# Patient Record
Sex: FEMALE | Race: WHITE | Employment: OTHER | URBAN - METROPOLITAN AREA
[De-identification: names, ages, dates, MRNs, and addresses within clinical notes are randomized per-mention and may not be internally consistent; named-entity substitution may affect disease eponyms.]

---

## 2017-03-03 ENCOUNTER — TELEPHONE (OUTPATIENT)
Dept: DERMATOLOGY | Age: 72
End: 2017-03-03

## 2017-03-29 ENCOUNTER — HOSPITAL ENCOUNTER (OUTPATIENT)
Dept: MAMMOGRAPHY | Age: 72
Discharge: HOME | End: 2017-03-29
Attending: OBSTETRICS & GYNECOLOGY | Admitting: OBSTETRICS & GYNECOLOGY

## 2017-03-29 DIAGNOSIS — Z12.31 ENCOUNTER FOR SCREENING MAMMOGRAM FOR BREAST CANCER: ICD-10-CM

## 2017-05-08 ENCOUNTER — HOSPITAL ENCOUNTER (OUTPATIENT)
Dept: OTHER | Age: 72
Discharge: HOME | End: 2017-05-08
Attending: FAMILY MEDICINE | Admitting: FAMILY MEDICINE

## 2017-05-09 PROBLEM — M60.9 MYOSITIS OF MULTIPLE SITES: Status: ACTIVE | Noted: 2017-05-09

## 2017-05-09 LAB
ESTIMATED AVERAGE GLUCOSE: 185.8 MG/DL
HBA1C MFR BLD: 8.1 %

## 2017-06-06 ENCOUNTER — TELEPHONE (OUTPATIENT)
Dept: FAMILY MEDICINE CLINIC | Age: 72
End: 2017-06-06

## 2017-06-06 ENCOUNTER — OFFICE VISIT (OUTPATIENT)
Dept: DERMATOLOGY | Age: 72
End: 2017-06-06

## 2017-06-06 DIAGNOSIS — L82.1 SK (SEBORRHEIC KERATOSIS): Primary | ICD-10-CM

## 2017-06-06 DIAGNOSIS — L82.0 INFLAMED SEBORRHEIC KERATOSIS: ICD-10-CM

## 2017-06-06 DIAGNOSIS — L85.1 STUCCO KERATOSIS: ICD-10-CM

## 2017-06-06 DIAGNOSIS — Z85.828 HISTORY OF BASAL CELL CARCINOMA: ICD-10-CM

## 2017-06-06 PROCEDURE — 99213 OFFICE O/P EST LOW 20 MIN: CPT | Performed by: DERMATOLOGY

## 2017-06-06 PROCEDURE — 17110 DESTRUCTION B9 LES UP TO 14: CPT | Performed by: DERMATOLOGY

## 2017-07-07 PROBLEM — T50.905A MEDICATION REACTION: Status: ACTIVE | Noted: 2017-07-07

## 2017-07-17 ENCOUNTER — TELEPHONE (OUTPATIENT)
Dept: FAMILY MEDICINE CLINIC | Age: 72
End: 2017-07-17

## 2017-07-18 ENCOUNTER — OFFICE VISIT (OUTPATIENT)
Dept: FAMILY MEDICINE CLINIC | Age: 72
End: 2017-07-18

## 2017-07-18 VITALS
WEIGHT: 229 LBS | BODY MASS INDEX: 36.41 KG/M2 | SYSTOLIC BLOOD PRESSURE: 134 MMHG | DIASTOLIC BLOOD PRESSURE: 86 MMHG | HEART RATE: 114 BPM | OXYGEN SATURATION: 93 %

## 2017-07-18 DIAGNOSIS — K59.1 FUNCTIONAL DIARRHEA: ICD-10-CM

## 2017-07-18 DIAGNOSIS — E11.9 TYPE 2 DIABETES MELLITUS WITHOUT COMPLICATION, WITHOUT LONG-TERM CURRENT USE OF INSULIN (HCC): Primary | ICD-10-CM

## 2017-07-18 DIAGNOSIS — G56.03 BILATERAL CARPAL TUNNEL SYNDROME: ICD-10-CM

## 2017-07-18 DIAGNOSIS — J44.9 COPD, MODERATE (HCC): ICD-10-CM

## 2017-07-18 PROCEDURE — 99214 OFFICE O/P EST MOD 30 MIN: CPT | Performed by: FAMILY MEDICINE

## 2017-07-18 RX ORDER — GLIMEPIRIDE 1 MG/1
1 TABLET ORAL
Qty: 30 TABLET | Refills: 3 | Status: SHIPPED | OUTPATIENT
Start: 2017-07-18 | End: 2017-08-22 | Stop reason: DRUGHIGH

## 2017-07-18 RX ORDER — METFORMIN HYDROCHLORIDE EXTENDED-RELEASE TABLETS 500 MG/1
1000 TABLET, FILM COATED, EXTENDED RELEASE ORAL 2 TIMES DAILY WITH MEALS
Qty: 120 TABLET | Refills: 3 | Status: SHIPPED | OUTPATIENT
Start: 2017-07-18 | End: 2017-10-11 | Stop reason: SDUPTHER

## 2017-07-19 ENCOUNTER — OFFICE VISIT (OUTPATIENT)
Dept: PULMONOLOGY | Age: 72
End: 2017-07-19

## 2017-07-19 ENCOUNTER — PROCEDURE VISIT (OUTPATIENT)
Dept: NEUROLOGY | Age: 72
End: 2017-07-19

## 2017-07-19 VITALS — HEART RATE: 81 BPM | SYSTOLIC BLOOD PRESSURE: 132 MMHG | DIASTOLIC BLOOD PRESSURE: 83 MMHG

## 2017-07-19 DIAGNOSIS — G56.03 BILATERAL CARPAL TUNNEL SYNDROME: Primary | ICD-10-CM

## 2017-07-19 DIAGNOSIS — G56.23 ULNAR NEUROPATHY OF BOTH UPPER EXTREMITIES: ICD-10-CM

## 2017-07-19 DIAGNOSIS — J44.9 COPD, MODERATE (HCC): ICD-10-CM

## 2017-07-19 DIAGNOSIS — R06.02 SOB (SHORTNESS OF BREATH): Primary | ICD-10-CM

## 2017-07-19 DIAGNOSIS — I10 ESSENTIAL HYPERTENSION: Chronic | ICD-10-CM

## 2017-07-19 PROCEDURE — 95911 NRV CNDJ TEST 9-10 STUDIES: CPT | Performed by: PSYCHIATRY & NEUROLOGY

## 2017-07-19 PROCEDURE — 99214 OFFICE O/P EST MOD 30 MIN: CPT | Performed by: INTERNAL MEDICINE

## 2017-07-19 PROCEDURE — 95886 MUSC TEST DONE W/N TEST COMP: CPT | Performed by: PSYCHIATRY & NEUROLOGY

## 2017-07-19 RX ORDER — ALBUTEROL SULFATE 90 UG/1
2 AEROSOL, METERED RESPIRATORY (INHALATION) EVERY 6 HOURS PRN
Qty: 1 INHALER | Refills: 11 | Status: SHIPPED | OUTPATIENT
Start: 2017-07-19 | End: 2017-10-11 | Stop reason: SDUPTHER

## 2017-07-19 RX ORDER — ALBUTEROL SULFATE 90 UG/1
2 AEROSOL, METERED RESPIRATORY (INHALATION) EVERY 6 HOURS PRN
Qty: 1 INHALER | Refills: 11 | Status: SHIPPED | OUTPATIENT
Start: 2017-07-19 | End: 2019-01-07

## 2017-07-19 RX ORDER — SAXAGLIPTIN 5 MG/1
1 TABLET, FILM COATED ORAL DAILY
COMMUNITY
Start: 2017-06-30 | End: 2017-07-19 | Stop reason: SINTOL

## 2017-07-20 DIAGNOSIS — G56.03 BILATERAL CARPAL TUNNEL SYNDROME: Primary | ICD-10-CM

## 2017-08-09 ENCOUNTER — OFFICE VISIT (OUTPATIENT)
Dept: ORTHOPEDIC SURGERY | Age: 72
End: 2017-08-09

## 2017-08-09 ENCOUNTER — TELEPHONE (OUTPATIENT)
Dept: PULMONOLOGY | Age: 72
End: 2017-08-09

## 2017-08-09 ENCOUNTER — TELEPHONE (OUTPATIENT)
Dept: FAMILY MEDICINE CLINIC | Age: 72
End: 2017-08-09

## 2017-08-09 VITALS
WEIGHT: 229 LBS | BODY MASS INDEX: 36.8 KG/M2 | SYSTOLIC BLOOD PRESSURE: 141 MMHG | HEART RATE: 66 BPM | RESPIRATION RATE: 17 BRPM | DIASTOLIC BLOOD PRESSURE: 79 MMHG | HEIGHT: 66 IN

## 2017-08-09 DIAGNOSIS — G56.03 BILATERAL CARPAL TUNNEL SYNDROME: Primary | ICD-10-CM

## 2017-08-09 PROCEDURE — 20526 THER INJECTION CARP TUNNEL: CPT | Performed by: ORTHOPAEDIC SURGERY

## 2017-08-09 PROCEDURE — 99203 OFFICE O/P NEW LOW 30 MIN: CPT | Performed by: ORTHOPAEDIC SURGERY

## 2017-08-21 ENCOUNTER — TELEPHONE (OUTPATIENT)
Dept: FAMILY MEDICINE CLINIC | Age: 72
End: 2017-08-21

## 2017-08-22 RX ORDER — GLIMEPIRIDE 2 MG/1
2 TABLET ORAL EVERY MORNING
Qty: 30 TABLET | Refills: 2 | Status: SHIPPED | OUTPATIENT
Start: 2017-08-22 | End: 2017-11-08 | Stop reason: SDUPTHER

## 2017-10-11 PROBLEM — G56.02 LEFT CARPAL TUNNEL SYNDROME: Status: ACTIVE | Noted: 2017-10-11

## 2017-10-11 PROBLEM — G56.22 ULNAR NEUROPATHY OF LEFT UPPER EXTREMITY: Status: ACTIVE | Noted: 2017-10-11

## 2017-10-17 ENCOUNTER — PAT TELEPHONE (OUTPATIENT)
Dept: PREADMISSION TESTING | Age: 72
End: 2017-10-17

## 2017-10-17 ENCOUNTER — TELEPHONE (OUTPATIENT)
Dept: ORTHOPEDIC SURGERY | Age: 72
End: 2017-10-17

## 2017-10-17 NOTE — TELEPHONE ENCOUNTER
Patient has sx scheduled with CBW this Thursday  She had a pre op physical done already and just wanted to make sure that is all she needs    Not sure if she needed anything else prior to sx- or if she does will it be done the day of sx    Pls call patient at [phone]

## 2017-10-18 ENCOUNTER — SURG/PROC ORDERS (OUTPATIENT)
Dept: ANESTHESIOLOGY | Age: 72
End: 2017-10-18

## 2017-10-18 VITALS — WEIGHT: 225 LBS | HEIGHT: 67 IN | BODY MASS INDEX: 35.31 KG/M2

## 2017-10-18 RX ORDER — SODIUM CHLORIDE 9 MG/ML
INJECTION, SOLUTION INTRAVENOUS CONTINUOUS
Status: CANCELLED | OUTPATIENT
Start: 2017-10-18

## 2017-10-18 RX ORDER — SODIUM CHLORIDE 0.9 % (FLUSH) 0.9 %
10 SYRINGE (ML) INJECTION EVERY 12 HOURS SCHEDULED
Status: CANCELLED | OUTPATIENT
Start: 2017-10-18

## 2017-10-18 RX ORDER — SODIUM CHLORIDE 0.9 % (FLUSH) 0.9 %
10 SYRINGE (ML) INJECTION PRN
Status: CANCELLED | OUTPATIENT
Start: 2017-10-18

## 2017-10-18 ASSESSMENT — PAIN DESCRIPTION - LOCATION: LOCATION: NECK

## 2017-10-18 ASSESSMENT — PAIN - FUNCTIONAL ASSESSMENT: PAIN_FUNCTIONAL_ASSESSMENT: 0-10

## 2017-10-18 ASSESSMENT — PAIN SCALES - GENERAL: PAINLEVEL_OUTOF10: 5

## 2017-10-18 NOTE — PRE-PROCEDURE INSTRUCTIONS
C-Difficile admission screening and protocol:     * Admitted with diarrhea? no     *Prior history of C-Diff. In last 3 months?no     *Antibiotic use in the past 6-8 weeks? no     *Prior hospitalization or nursing home in the last month?   no

## 2017-10-18 NOTE — PRE-PROCEDURE INSTRUCTIONS
[facility] time__0700__________        Surgery time___0830_________    Take the following medications with a sip of water: prozac,atenolol,losartan    Do not eat or drink anything after 12:00 midnight prior to your surgery. This includes water chewing gum, mints and ice chips. You may brush your teeth and gargle the morning of your surgery, but do not swallow the water     Please see your family doctor/pediatrician for a history and physical and/or concerning medications. Bring any test results/reports from your physicians office. If you are under the care of a heart doctor or specialist doctor, please be aware that you may be asked to them for clearance    You may be asked to stop blood thinners such as Coumadin, Plavix, Fragmin, Lovenox, etc., or any anti-inflammatories such as:  Aspirin, Ibuprofen, Advil, Naproxen prior to your surgery. We also ask that you stop any OTC medications such as fish oil, vitamin E, glucosamine, garlic, Multivitamins, COQ 10, etc.    We ask that you do not smoke 24 hours prior to surgery  We ask that you do not  drink any alcoholic beverages 24 hours prior to surgery     You must make arrangements for a responsible adult to take you home after your surgery. For your safety you will not be allowed to leave alone or drive yourself home. Your surgery will be cancelled if you do not have a ride home. Also for your safety, it is strongly suggested that someone stay with you the first 24 hours after your surgery. A parent or legal guardian must accompany a child scheduled for surgery and plan to stay at the hospital until the child is discharged. Please do not bring other children with you. For your comfort, please wear simple loose fitting clothing to the hospital.  Please do not bring valuables.     Do not wear any make-up or nail polish on your fingers or toes      For your safety, please do not wear any jewelry

## 2017-10-19 ENCOUNTER — HOSPITAL ENCOUNTER (OUTPATIENT)
Dept: SURGERY | Age: 72
Discharge: HOME | End: 2017-10-19
Attending: ORTHOPAEDIC SURGERY | Admitting: ORTHOPAEDIC SURGERY

## 2017-10-19 VITALS
WEIGHT: 232.14 LBS | TEMPERATURE: 98 F | HEART RATE: 68 BPM | RESPIRATION RATE: 18 BRPM | HEIGHT: 66 IN | OXYGEN SATURATION: 94 % | DIASTOLIC BLOOD PRESSURE: 63 MMHG | BODY MASS INDEX: 37.31 KG/M2 | SYSTOLIC BLOOD PRESSURE: 113 MMHG

## 2017-10-19 LAB
ANION GAP SERPL CALCULATED.3IONS-SCNC: 14 MMOL/L (ref 3–16)
BUN BLDV-MCNC: 18 MG/DL (ref 7–20)
CALCIUM SERPL-MCNC: 9.4 MG/DL (ref 8.3–10.6)
CHLORIDE BLD-SCNC: 100 MMOL/L (ref 99–110)
CO2: 25 MMOL/L (ref 21–32)
CREAT SERPL-MCNC: <0.5 MG/DL (ref 0.6–1.2)
GFR AFRICAN AMERICAN: >60
GFR NON-AFRICAN AMERICAN: >60
GLUCOSE BLD-MCNC: 126 MG/DL (ref 70–99)
GLUCOSE BLD-MCNC: 136 MG/DL (ref 70–99)
PERFORMED ON: ABNORMAL
POTASSIUM SERPL-SCNC: 4 MMOL/L (ref 3.5–5.1)
SODIUM BLD-SCNC: 139 MMOL/L (ref 136–145)

## 2017-10-19 RX ORDER — LABETALOL HYDROCHLORIDE 5 MG/ML
5 INJECTION, SOLUTION INTRAVENOUS EVERY 10 MIN PRN
Status: DISCONTINUED | OUTPATIENT
Start: 2017-10-19 | End: 2017-10-20 | Stop reason: HOSPADM

## 2017-10-19 RX ORDER — FENTANYL CITRATE 50 UG/ML
25 INJECTION, SOLUTION INTRAMUSCULAR; INTRAVENOUS EVERY 5 MIN PRN
Status: DISCONTINUED | OUTPATIENT
Start: 2017-10-19 | End: 2017-10-20 | Stop reason: HOSPADM

## 2017-10-19 RX ORDER — PROMETHAZINE HYDROCHLORIDE 25 MG/ML
6.25 INJECTION, SOLUTION INTRAMUSCULAR; INTRAVENOUS
Status: ACTIVE | OUTPATIENT
Start: 2017-10-19 | End: 2017-10-19

## 2017-10-19 RX ORDER — SODIUM CHLORIDE 0.9 % (FLUSH) 0.9 %
10 SYRINGE (ML) INJECTION EVERY 12 HOURS SCHEDULED
Status: DISCONTINUED | OUTPATIENT
Start: 2017-10-19 | End: 2017-10-20 | Stop reason: HOSPADM

## 2017-10-19 RX ORDER — SODIUM CHLORIDE 0.9 % (FLUSH) 0.9 %
10 SYRINGE (ML) INJECTION PRN
Status: DISCONTINUED | OUTPATIENT
Start: 2017-10-19 | End: 2017-10-20 | Stop reason: HOSPADM

## 2017-10-19 RX ORDER — SODIUM CHLORIDE 9 MG/ML
INJECTION, SOLUTION INTRAVENOUS CONTINUOUS
Status: DISCONTINUED | OUTPATIENT
Start: 2017-10-19 | End: 2017-10-20 | Stop reason: HOSPADM

## 2017-10-19 RX ORDER — FENTANYL CITRATE 50 UG/ML
50 INJECTION, SOLUTION INTRAMUSCULAR; INTRAVENOUS EVERY 5 MIN PRN
Status: DISCONTINUED | OUTPATIENT
Start: 2017-10-19 | End: 2017-10-20 | Stop reason: HOSPADM

## 2017-10-19 RX ADMIN — SODIUM CHLORIDE: 9 INJECTION, SOLUTION INTRAVENOUS at 07:59

## 2017-10-19 ASSESSMENT — ENCOUNTER SYMPTOMS: SHORTNESS OF BREATH: 1

## 2017-10-19 ASSESSMENT — PAIN - FUNCTIONAL ASSESSMENT: PAIN_FUNCTIONAL_ASSESSMENT: 0-10

## 2017-10-19 ASSESSMENT — PAIN SCALES - GENERAL
PAINLEVEL_OUTOF10: 0

## 2017-10-19 ASSESSMENT — LIFESTYLE VARIABLES: SMOKING_STATUS: 0

## 2017-10-19 NOTE — OP NOTE
OPERATIVE REPORT              . Patient:  Lui Avalos    YOB: 1945  Date of Service:  10/19/2017  Location:  [address] [place] [address]      Preoperative Diagnoses:  Right  carpal tunnel syndrome & Right cubital tunnel syndrome     Postoperative Diagnoses:  Same    Procedures:   Right  Carpal Tunnel Release & Right Ulnar Nerve decompression at the Cubital Tunnel     Surgeon:    Aubrey Burns. Abiodun Kidd MD    Anesthesia:   General                                   Blood Loss:    Minimal         Complications:    None                          Tourniquet Time:   6 minutes      Indications: Ms. Lui Avalos is a 71y.o.  year old female with Right  carpal tunnel syndrome & Right cubital tunnel syndrome . I have discussed preoperatively with her  the complications, limitations, expectations, alternatives and risk of the planned surgical care which she understood & all of her  questions were answered. Ms. Lui Avalos has provided written informed consent to proceed. After written consent was obtained and the proper operative sites were identified and marked, Ms. Lui Avalos was brought to the operating room and placed in the supine position on the operating room table with the Right arm extended upon a hand table. General anesthesia was induced & the Right upper extremity was prepped and draped in the usual sterile fashion. Procedure:   After Esmarch exsanguination, the pneuomo-tourniquet was inflated to 250 millimeters of Mercury about the arm. Attention was turned to the medial elbow. A curvilinear incision was fashioned over the posterior medial aspect of the elbow centered between the medial epicondyle and the tip of the olecranon. Dissection was carried carefully through the subcutaneous tissue identifying and protecting the superficial neurovascular structures. The cubital tunnel was identified and cleared of overlying soft tissue.  Careful incision allowed exposure of the ulnar nerve. The nerve was traced proximally and circumferential control of the nerve was obtained. It was dissected proximally to the level of the connection between the biceps and triceps muscles, approximately 3 cm proximal to the medial epicondyle. It was carefully mobilized from the medial intermuscular septum. It was traced distally, being completely freed along the course of the cubital tunnel, and was dissected distally to the level of the second motor branch as it split the heads of the FCU muscle. There was a tight fibrous band at the confluence of the heads of the FCU which was carefully divided. Digital palpation revealed that there were no further proximal or distal constriction about the nerve. The Ulnar Nerve was found to be stable in it's groove without tendency toward subluxation or snapping. Attention was turned to the palm. A 2 cm longitudinal incision was fashioned at the base of the palm, paralleling the longitudinal thenar crease. Dissection was carried carefully through the subcutaneous tissue identifying and protecting the neurovascular structures. The palmar fascia was incised longitudinally, exposing the transverse carpal ligament. The transverse carpal ligament was incised from its proximal to distal most extent, under direct visualization. The terminal 2 cm of antebrachial fascia was similarly incised under direct visualization. The contents of the carpal tunnel were inspected and found to be free of mass, lesion or other abnormality. Digital palpation revealed no further constriction about the median nerve. The incisions were all irrigated copiously with sterile saline for irrigation. The pneumo-tourniquet was deflated after a period of 6 minutes elevation & the fingers were immediately pink and well perfused. Hemostasis was easily obtained with direct pressure and electrocautery. The incisions were closed in layers with interrupted absorbable sutures.   The wounds were dressed with Adaptic & dry sterile dressings after local anesthetic was instilled for postoperative analgesia. Ms. Roger Dodson was awakened from anesthesia having tolerated the procedure without apparent complication. She was returned to the recovery room in stable condition. At the conclusion of the procedure, all needle, instrument and sponge counts were correct. Patricia Cheema MD   10/19/2017, 8:37 AM

## 2017-10-19 NOTE — H&P
Pre-operative Update of H&P:    I  have seen & examined Ms. Contreras Centers related solely to her hand and upper extremity conditions, prior to the scheduled procedure on the date of her surgery. The indications for the planned surgical procedure & and her upper-extremity conditionare unchanged. Please see the Anesthesia Pre-Op Note from date of surgery for Ms. Kaylee ALVARENGA Ulysses's systemic evaluation.

## 2017-10-19 NOTE — ANESTHESIA PRE-OP
9926  Pulse  Av  Min: 68   Min taken time: 10/19/17 0702  Max: 68   Max taken time: 10/19/17 0702  Resp  Av  Min: 12   Min taken time: 10/19/17 0702  Max: 12   Max taken time: 10/19/17 0702  BP  Min: 133/82   Min taken time: 10/19/17 0702  Max: 133/82   Max taken time: 10/19/17 0702  SpO2  Av %  Min: 94 %   Min taken time: 10/19/17 07  Max: 94 %   Max taken time: 10/19/17 0702    BP Readings from Last 3 Encounters:   10/19/17 133/82   10/11/17 (!) 134/90   17 (!) 141/79     BMI  Body mass index is 37.47 kg/m². Estimated body mass index is 37.47 kg/m² as calculated from the following:    Height as of this encounter: 5' 6\" (1.676 m). Weight as of this encounter: 232 lb 2.3 oz (105.3 kg). CBC   Lab Results   Component Value Date    WBC 9.1 2016    RBC 4.58 2016    HGB 10.7 2016    HCT 34.2 2016    MCV 90.2 2016    RDW 14.3 2016     2016     CMP    Lab Results   Component Value Date     2016    K 4.2 2016    CL 99 2016    CO2 26 2016    BUN 12 2016    CREATININE <0.5 2016    GFRAA >60 2016    GFRAA >60 2013    AGRATIO 1.3 2016    LABGLOM >60 2016    LABGLOM 97 2013    GLUCOSE 142 2016    PROT 7.1 2016    CALCIUM 9.6 2016    BILITOT 0.5 2016    ALKPHOS 156 2016    AST 28 2016    ALT 26 2016     BMP    Lab Results   Component Value Date     2016    K 4.2 2016    CL 99 2016    CO2 26 2016    BUN 12 2016    CREATININE <0.5 2016    CALCIUM 9.6 2016    GFRAA >60 2016    GFRAA >60 2013    LABGLOM >60 2016    LABGLOM 97 2013    GLUCOSE 142 2016     POCGlucose  No results for input(s): GLUCOSE in the last 72 hours.    [facility]    Lab Results   Component Value Date    PROTIME 11.8 2016    INR 1.03 2016    APTT 34.8      HCG (If Applicable) No results found for: PREGTESTUR, PREGSERUM, HCG, HCGQUANT   ABGs No results found for: PHART, PO2ART, GFX3WRH, WLJ8SJL, BEART, K7PFIQVK   Type & Screen (If Applicable)  No results found for: LABABO, LABRH                         BMI: Wt Readings from Last 3 Encounters:       NPO Status:   Date of last liquid consumption: 10/18/17   Time of last liquid consumption: 2000   Date of last solid food consumption: 10/18/17      Time of last solid consumption: 2000       Anesthesia Evaluation  Patient summary reviewed no history of anesthetic complications:   Airway: Mallampati: III  TM distance: >3 FB   Neck ROM: full  Mouth opening: > = 3 FB Dental: normal exam         Pulmonary: breath sounds clear to auscultation  (+) COPD (daily treatments but well controlled):  shortness of breath: chronic and no interval change,      (-) sleep apnea and wheezes                           Cardiovascular:    (+) hypertension:,     (-) valvular problems/murmurs, past MI, CABG/stent, dysrhythmias and  angina    ECG reviewed  Rhythm: regular  Rate: normal           Beta Blocker:  Dose within 24 Hrs         Neuro/Psych:   (+) neuromuscular disease:, psychiatric history:   (-) seizures, TIA and CVA           GI/Hepatic/Renal:        (-) GERD and liver disease       Endo/Other:    (+) Type II DM, , : arthritis:. Abdominal:   (+) obese,         Vascular:                                    Anesthesia Plan      general     ASA 3           MIPS: Postoperative opioids intended and Prophylactic antiemetics administered. Anesthetic plan and risks discussed with patient. Plan discussed with CRNA. This pre-anesthesia assessment may be used as a history and physical.    DOS STAFF ADDENDUM:    Pt seen and examined, chart reviewed (including anesthesia, drug and allergy history). No interval changes to history and physical examination.   Anesthetic plan, risks, benefits, alternatives, and personnel involved discussed with patient. Patient verbalized an understanding and agrees to proceed.       Brennan Longo MD  October 19, 2017  7:17 AM

## 2017-10-19 NOTE — PROGRESS NOTES
Received from PACU. Admitted to Phase 2 care. Awake and alert, respirations easy and even. Oriented to room and surroundings. No complaints.

## 2017-10-20 LAB
EKG ATRIAL RATE: 69 BPM
EKG DIAGNOSIS: NORMAL
EKG P AXIS: 41 DEGREES
EKG P-R INTERVAL: 166 MS
EKG Q-T INTERVAL: 424 MS
EKG QRS DURATION: 84 MS
EKG QTC CALCULATION (BAZETT): 454 MS
EKG R AXIS: 31 DEGREES
EKG T AXIS: 30 DEGREES
EKG VENTRICULAR RATE: 69 BPM

## 2017-10-25 ENCOUNTER — OFFICE VISIT (OUTPATIENT)
Dept: ORTHOPEDIC SURGERY | Age: 72
End: 2017-10-25

## 2017-10-25 VITALS — BODY MASS INDEX: 36.1 KG/M2 | RESPIRATION RATE: 16 BRPM | HEIGHT: 67 IN | WEIGHT: 230 LBS

## 2017-10-25 DIAGNOSIS — G56.03 BILATERAL CARPAL TUNNEL SYNDROME: Primary | ICD-10-CM

## 2017-10-25 PROCEDURE — 99024 POSTOP FOLLOW-UP VISIT: CPT | Performed by: PHYSICIAN ASSISTANT

## 2017-10-25 NOTE — PATIENT INSTRUCTIONS
Postoperative Instructions After Ulnar Nerve Decompression    Dr. Amber Edmonds        1. After bandages are removed one week from surgery, you may chose to wear a small bandage over the incision if you wish, though you do not need to. 2. Keep incision dry for a total of 12 days from the day of surgery. Thereafter, you may wash with mild soap and water and shower normally. 3. Once your stiches have fully disappeared, you should begin gently massaging the incision with Vitamin E (may use Vitamin E lotion or contents of Vitamin E capsule). 4. Work hard on motion of the fingers and wrist & elbow, straightening each finger fully and bending each finger fully, bending wrist forward and bending wrist backwards, fully straightening elbow and fully bending elbow. Do not be concerned if you experience discomfort. This will not damage the surgery. 5. You may begin using the hand & arm as it feels comfortable beginning 12-14 days from the day of surgery. You may not feel entirely comfortable gripping or lifting heavy objects for several weeks. 6. The stitches are dissolvable and may take up to three weeks from day of surgery to completely disappear. 7. You may expect to see some skin peel off around the incision. You may be left with a small area of pink baby skin. This is quite normal.    Thank you for choosing [facility]) Physicians for your Hand and Upper Extremity needs. If we can be of any further assistance to you, please do not hesitate to contact us.     Office Phone Number:  [phone]  or  [phone]

## 2017-10-31 ENCOUNTER — PAT TELEPHONE (OUTPATIENT)
Dept: PREADMISSION TESTING | Age: 72
End: 2017-10-31

## 2017-10-31 ENCOUNTER — SURG/PROC ORDERS (OUTPATIENT)
Dept: ANESTHESIOLOGY | Age: 72
End: 2017-10-31

## 2017-10-31 RX ORDER — SODIUM CHLORIDE 0.9 % (FLUSH) 0.9 %
10 SYRINGE (ML) INJECTION PRN
Status: CANCELLED | OUTPATIENT
Start: 2017-11-02

## 2017-10-31 RX ORDER — SODIUM CHLORIDE 9 MG/ML
INJECTION, SOLUTION INTRAVENOUS CONTINUOUS
Status: CANCELLED | OUTPATIENT
Start: 2017-11-02

## 2017-10-31 RX ORDER — SODIUM CHLORIDE 0.9 % (FLUSH) 0.9 %
10 SYRINGE (ML) INJECTION EVERY 12 HOURS SCHEDULED
Status: CANCELLED | OUTPATIENT
Start: 2017-11-02

## 2017-10-31 ASSESSMENT — PAIN - FUNCTIONAL ASSESSMENT: PAIN_FUNCTIONAL_ASSESSMENT: 0-10

## 2017-10-31 ASSESSMENT — PAIN SCALES - GENERAL: PAINLEVEL_OUTOF10: 8

## 2017-10-31 ASSESSMENT — PAIN DESCRIPTION - DESCRIPTORS: DESCRIPTORS: ACHING

## 2017-10-31 ASSESSMENT — PAIN DESCRIPTION - PAIN TYPE: TYPE: CHRONIC PAIN

## 2017-10-31 ASSESSMENT — PAIN DESCRIPTION - ORIENTATION: ORIENTATION: LEFT

## 2017-11-02 ENCOUNTER — HOSPITAL ENCOUNTER (OUTPATIENT)
Dept: SURGERY | Age: 72
Discharge: HOME | End: 2017-11-02
Attending: ORTHOPAEDIC SURGERY | Admitting: ORTHOPAEDIC SURGERY

## 2017-11-02 VITALS
BODY MASS INDEX: 36.3 KG/M2 | DIASTOLIC BLOOD PRESSURE: 76 MMHG | WEIGHT: 231.26 LBS | HEART RATE: 72 BPM | RESPIRATION RATE: 20 BRPM | SYSTOLIC BLOOD PRESSURE: 140 MMHG | OXYGEN SATURATION: 94 % | HEIGHT: 67 IN | TEMPERATURE: 96.9 F

## 2017-11-02 LAB
ANION GAP SERPL CALCULATED.3IONS-SCNC: 13 MMOL/L (ref 3–16)
BUN BLDV-MCNC: 13 MG/DL (ref 7–20)
CALCIUM SERPL-MCNC: 9.1 MG/DL (ref 8.3–10.6)
CHLORIDE BLD-SCNC: 100 MMOL/L (ref 99–110)
CO2: 25 MMOL/L (ref 21–32)
CREAT SERPL-MCNC: <0.5 MG/DL (ref 0.6–1.2)
GFR AFRICAN AMERICAN: >60
GFR NON-AFRICAN AMERICAN: >60
GLUCOSE BLD-MCNC: 125 MG/DL (ref 70–99)
GLUCOSE BLD-MCNC: 142 MG/DL (ref 70–99)
GLUCOSE BLD-MCNC: 176 MG/DL (ref 70–99)
PERFORMED ON: ABNORMAL
PERFORMED ON: ABNORMAL
POTASSIUM SERPL-SCNC: 4.2 MMOL/L (ref 3.5–5.1)
SODIUM BLD-SCNC: 138 MMOL/L (ref 136–145)

## 2017-11-02 RX ORDER — MEPERIDINE HYDROCHLORIDE 25 MG/ML
12.5 INJECTION INTRAMUSCULAR; INTRAVENOUS; SUBCUTANEOUS EVERY 5 MIN PRN
Status: DISCONTINUED | OUTPATIENT
Start: 2017-11-02 | End: 2017-11-03 | Stop reason: HOSPADM

## 2017-11-02 RX ORDER — FENTANYL CITRATE 50 UG/ML
25 INJECTION, SOLUTION INTRAMUSCULAR; INTRAVENOUS EVERY 5 MIN PRN
Status: DISCONTINUED | OUTPATIENT
Start: 2017-11-02 | End: 2017-11-03 | Stop reason: HOSPADM

## 2017-11-02 RX ORDER — FENTANYL CITRATE 50 UG/ML
50 INJECTION, SOLUTION INTRAMUSCULAR; INTRAVENOUS EVERY 5 MIN PRN
Status: DISCONTINUED | OUTPATIENT
Start: 2017-11-02 | End: 2017-11-03 | Stop reason: HOSPADM

## 2017-11-02 RX ORDER — SODIUM CHLORIDE 0.9 % (FLUSH) 0.9 %
10 SYRINGE (ML) INJECTION PRN
Status: DISCONTINUED | OUTPATIENT
Start: 2017-11-02 | End: 2017-11-03 | Stop reason: HOSPADM

## 2017-11-02 RX ORDER — ACETAMINOPHEN 325 MG/1
650 TABLET ORAL ONCE
Status: COMPLETED | OUTPATIENT
Start: 2017-11-02 | End: 2017-11-02

## 2017-11-02 RX ORDER — SODIUM CHLORIDE 9 MG/ML
INJECTION, SOLUTION INTRAVENOUS CONTINUOUS
Status: DISCONTINUED | OUTPATIENT
Start: 2017-11-02 | End: 2017-11-03 | Stop reason: HOSPADM

## 2017-11-02 RX ORDER — ONDANSETRON 2 MG/ML
4 INJECTION INTRAMUSCULAR; INTRAVENOUS
Status: ACTIVE | OUTPATIENT
Start: 2017-11-02 | End: 2017-11-02

## 2017-11-02 RX ORDER — SODIUM CHLORIDE 0.9 % (FLUSH) 0.9 %
10 SYRINGE (ML) INJECTION EVERY 12 HOURS SCHEDULED
Status: DISCONTINUED | OUTPATIENT
Start: 2017-11-02 | End: 2017-11-03 | Stop reason: HOSPADM

## 2017-11-02 RX ADMIN — SODIUM CHLORIDE: 9 INJECTION, SOLUTION INTRAVENOUS at 09:56

## 2017-11-02 RX ADMIN — ACETAMINOPHEN 650 MG: 325 TABLET ORAL at 12:16

## 2017-11-02 ASSESSMENT — PAIN DESCRIPTION - DESCRIPTORS
DESCRIPTORS: ACHING
DESCRIPTORS: DISCOMFORT

## 2017-11-02 ASSESSMENT — PAIN SCALES - GENERAL
PAINLEVEL_OUTOF10: 3
PAINLEVEL_OUTOF10: 0
PAINLEVEL_OUTOF10: 3

## 2017-11-02 ASSESSMENT — PAIN DESCRIPTION - LOCATION: LOCATION: HAND;ARM;ELBOW

## 2017-11-02 ASSESSMENT — PAIN DESCRIPTION - PROGRESSION: CLINICAL_PROGRESSION: NOT CHANGED

## 2017-11-02 ASSESSMENT — PAIN - FUNCTIONAL ASSESSMENT: PAIN_FUNCTIONAL_ASSESSMENT: 0-10

## 2017-11-02 ASSESSMENT — PAIN DESCRIPTION - ORIENTATION: ORIENTATION: LEFT

## 2017-11-02 ASSESSMENT — PAIN DESCRIPTION - FREQUENCY: FREQUENCY: INTERMITTENT

## 2017-11-02 ASSESSMENT — LIFESTYLE VARIABLES: SMOKING_STATUS: 0

## 2017-11-02 ASSESSMENT — PAIN DESCRIPTION - PAIN TYPE: TYPE: ACUTE PAIN;SURGICAL PAIN

## 2017-11-02 ASSESSMENT — ENCOUNTER SYMPTOMS: SHORTNESS OF BREATH: 1

## 2017-11-02 NOTE — OP NOTE
OPERATIVE REPORT              . Patient:  Fay Burnette    YOB: 1945  Date of Service:  11/2/2017  Location:  [facility]      Preoperative Diagnoses:  Left  carpal tunnel syndrome & Left cubital tunnel syndrome     Postoperative Diagnoses:  Same    Procedures:   Left  Carpal Tunnel Release & Left Ulnar Nerve decompression at the Cubital Tunnel     Surgeon:    Kallie Ramirez. Dean Rose MD    Anesthesia:   General                                   Blood Loss:    Minimal         Complications:    None                          Tourniquet Time:   7 minutes      Indications: Ms. Fay Burnette is a 71y.o.  year old female with Left  carpal tunnel syndrome & Left cubital tunnel syndrome . I have discussed preoperatively with her  the complications, limitations, expectations, alternatives and risk of the planned surgical care which she understood & all of her  questions were answered. Ms. Fay Burnette has provided written informed consent to proceed. After written consent was obtained and the proper operative sites were identified and marked, Ms. Fay Burnette was brought to the operating room and placed in the supine position on the operating room table with the Left arm extended upon a hand table. General anesthesia was induced & the Left upper extremity was prepped and draped in the usual sterile fashion. Procedure:   After Esmarch exsanguination, the pneuomo-tourniquet was inflated to 250 millimeters of Mercury about the arm. Attention was turned to the medial elbow. A curvilinear incision was fashioned over the posterior medial aspect of the elbow centered between the medial epicondyle and the tip of the olecranon. Dissection was carried carefully through the subcutaneous tissue identifying and protecting the superficial neurovascular structures. The cubital tunnel was identified and cleared of overlying soft tissue.  Careful incision allowed exposure of the ulnar nerve. The nerve was traced proximally and circumferential control of the nerve was obtained. It was dissected proximally to the level of the connection between the biceps and triceps muscles, approximately 3 cm proximal to the medial epicondyle. It was carefully mobilized from the medial intermuscular septum. It was traced distally, being completely freed along the course of the cubital tunnel, and was dissected distally to the level of the second motor branch as it split the heads of the FCU muscle. There was a tight fibrous band at the confluence of the heads of the FCU which was carefully divided. Digital palpation revealed that there were no further proximal or distal constriction about the nerve. The Ulnar Nerve was found to be stable in it's groove without tendency toward subluxation or snapping. Attention was turned to the palm. A 2 cm longitudinal incision was fashioned at the base of the palm, paralleling the longitudinal thenar crease. Dissection was carried carefully through the subcutaneous tissue identifying and protecting the neurovascular structures. The palmar fascia was incised longitudinally, exposing the transverse carpal ligament. The transverse carpal ligament was incised from its proximal to distal most extent, under direct visualization. The terminal 2 cm of antebrachial fascia was similarly incised under direct visualization. The contents of the carpal tunnel were inspected and found to be free of mass, lesion or other abnormality. Digital palpation revealed no further constriction about the median nerve. The incisions were all irrigated copiously with sterile saline for irrigation. The pneumo-tourniquet was deflated after a period of 7 minutes elevation & the fingers were immediately pink and well perfused. Hemostasis was easily obtained with direct pressure and electrocautery. The incisions were closed in layers with interrupted absorbable sutures.   The wounds were dressed with Adaptic & dry sterile dressings after local anesthetic was instilled for postoperative analgesia. Ms. Suri Corona was awakened from anesthesia having tolerated the procedure without apparent complication. She was returned to the recovery room in stable condition. At the conclusion of the procedure, all needle, instrument and sponge counts were correct. Charli Harris MD   11/2/2017, 10:51 AM

## 2017-11-08 ENCOUNTER — OFFICE VISIT (OUTPATIENT)
Dept: ORTHOPEDIC SURGERY | Age: 72
End: 2017-11-08

## 2017-11-08 VITALS — WEIGHT: 230 LBS | BODY MASS INDEX: 36.1 KG/M2 | HEIGHT: 67 IN

## 2017-11-08 DIAGNOSIS — G56.03 BILATERAL CARPAL TUNNEL SYNDROME: Primary | ICD-10-CM

## 2017-11-08 PROCEDURE — 99024 POSTOP FOLLOW-UP VISIT: CPT | Performed by: PHYSICIAN ASSISTANT

## 2017-11-08 RX ORDER — GLIMEPIRIDE 2 MG/1
TABLET ORAL
Qty: 30 TABLET | Refills: 3 | Status: SHIPPED | OUTPATIENT
Start: 2017-11-08 | End: 2018-02-23 | Stop reason: SDUPTHER

## 2017-11-08 NOTE — PROGRESS NOTES
Ms. Jeni Sicard returns today in follow-up of her recent left Ulnar Nerve Decompression (Cubital Tunnel Release) & Carpal Tunnel Release done approximately 1 week ago. She has done well noting mild discomfort and no other reported complications. She notes pre-operative symptoms to be Partially Resolved at this time. Physical Exam:  Skin incision is healing well, no significant drainage, no dehiscence. Digital range of motion is full and equal bilateral.  Wrist range of motion is full and equal bilateral.  Elbow range of motion remains somewhat limited due to discomfort. Sensation is normal in the Whole Hand. Vascular examination reveals normal, good capillary refill and good color. Swelling is minimal.  There is no clinical evidence of residual Median & Ulnar Nerve dysfuntion. Impression:  Ms. Jeni Sicard is doing well after recent left Ulnar Nerve Decompression (Cubital Tunnel Release) &  Carpal Tunnel Release. Plan:  Ms. Jeni Sicard is instructed in work on Active & Passive range of motion of the digits, wrist, & elbow. These modalities were specifically demonstrated to her today. We discussed the appropriateness of gradual resumption of use of the operated hand and the return to normal use as comfort allows. She is given instructions regarding management of the fresh surgical incision and progressive use of desensitization and tissue massage techniques. We discussed the appropriate expectations and timeline for symptom improvement. She is provided a written patient instruction sheet titled: Postoperative Instructions After Ulnar Nerve Decompression. I have asked Ms. Jeni Sicard to follow-up with me, either by scheduling an appointment for approximately 2-4 weeks from now, or by contacting me by telephone over the next 2-4 weeks if her symptoms have not fully resolved or if she has not regained full & painless return of function.       She is also specifically instructed to return to the office or call for an appointment sooner if her symptoms are changing or worsening prior to that time.

## 2017-11-15 ENCOUNTER — OFFICE VISIT (OUTPATIENT)
Dept: ORTHOPEDIC SURGERY | Age: 72
End: 2017-11-15

## 2017-11-15 VITALS — HEIGHT: 67 IN | WEIGHT: 230 LBS | RESPIRATION RATE: 16 BRPM | BODY MASS INDEX: 36.1 KG/M2

## 2017-11-15 DIAGNOSIS — G56.03 BILATERAL CARPAL TUNNEL SYNDROME: Primary | ICD-10-CM

## 2017-11-15 PROCEDURE — 99024 POSTOP FOLLOW-UP VISIT: CPT | Performed by: ORTHOPAEDIC SURGERY

## 2017-11-15 RX ORDER — SULFAMETHOXAZOLE AND TRIMETHOPRIM 800; 160 MG/1; MG/1
1 TABLET ORAL EVERY 12 HOURS SCHEDULED
Qty: 20 TABLET | Refills: 0 | Status: SHIPPED | OUTPATIENT
Start: 2017-11-15 | End: 2017-11-25

## 2017-11-15 RX ORDER — AMOXICILLIN AND CLAVULANATE POTASSIUM 875; 125 MG/1; MG/1
1 TABLET, FILM COATED ORAL EVERY 12 HOURS SCHEDULED
Qty: 20 TABLET | Refills: 0 | Status: SHIPPED | OUTPATIENT
Start: 2017-11-15 | End: 2017-11-25

## 2017-11-15 NOTE — PROGRESS NOTES
Ms. Jenn Russo returns today in follow-up of her recent left Ulnar Nerve Decompression (Cubital Tunnel Release) & Carpal Tunnel Release done approximately 10 days ago. She has done well noting moderate discomfort and reaction about the sutures at both sites. She notes pre-operative symptoms to be Improved at this time. Physical Exam:  Skin incision is superficially macerated in the palm and at both locations surrounded by mild erythema, warmth and tenderness mostly about the elbow  Digital range of motion is minimally limited and not painful. Wrist range of motion is mildly limited . Elbow range of motion remains somewhat limited due to discomfort. Sensation is increased in the Median Innervated Digits and Ulnar Innervated Digits. Vascular examination reveals normal and good capillary refill. Swelling is moderate. There is no clinical evidence of residual Median & Ulnar Nerve dysfuntion. Impression:  Ms. Jenn Russo is doing fairly well after recent left Ulnar Nerve Decompression (Cubital Tunnel Release) &  Carpal Tunnel Release. She has developed reaction to the absorbable sutures and has what appears to be an early cellulitis about the elbow. Plan:  Ms. Jenn Russo is instructed in work on Active & Passive range of motion of the digits, wrist, & elbow. These modalities were specifically demonstrated to her today. We discussed the appropriateness of gradual resumption of use of the operated hand and the return to normal use as comfort allows. She is given instructions regarding management of the fresh surgical incision and progressive use of desensitization and tissue massage techniques. We discussed the appropriate expectations and timeline for symptom improvement. She is provided a written patient instruction sheet titled: soaks and wound care. Ms. Roberta Hankins Ulysses's sutures were removed today without difficulty. Skin remained well approximated.      She is prescribed a course of dual oral antibiotics to treat the mild cellulitis. I have asked Ms. Keith Griffiths to follow-up with me, either by scheduling an appointment for approximately 2 days from now, or by contacting me by telephone over the next 2 days if her symptoms have not fully resolved or if she has not regained full & painless return of function. She is also specifically instructed to return to the office or call for an appointment sooner if her symptoms are changing or worsening prior to that time.

## 2017-11-15 NOTE — PATIENT INSTRUCTIONS
Protocol for Managing Open Wounds  Dr. Gabby Chaves. Grey            1. Please remove all dressings so that you may see the skin fully. 2. Prepare a clean sink full of warm water (bath temperature). Add one to two squirts of liquid antibacterial soap. Insert hand and soak for approximately five minutes, making sure to exercise your motion of fingers and wrists fully while soaking in warm water. 3. After finished soaking, pat wound dry. 4. Apply dressing as instructed in the office. A. Layer #1 - Adaptic gauze. B. Layer #2 - Light cotton gauze. Ayaka Petersen #3 - Tape or other covering. 5. I typically recommend soaking two to three times per day with a clean dressing change after each soak.

## 2017-11-17 ENCOUNTER — TELEPHONE (OUTPATIENT)
Dept: ORTHOPEDIC SURGERY | Age: 72
End: 2017-11-17

## 2017-11-17 NOTE — TELEPHONE ENCOUNTER
Patient calling to let Dr Barraza Channel that she feels her finger is improving. She will continue to take the antibiotic and call back if any other problems.    Any questions please call   [phone]

## 2017-11-20 ENCOUNTER — TELEPHONE (OUTPATIENT)
Dept: ORTHOPEDIC SURGERY | Age: 72
End: 2017-11-20

## 2017-11-20 NOTE — TELEPHONE ENCOUNTER
CBW spoke with pt regarding incision. Pt will continue taking abx and soaks. All signs of infection sound to be improved. Will call if necessary.

## 2017-11-20 NOTE — TELEPHONE ENCOUNTER
Patient had left CTR and nerve decompression at Cubital tunnel 11/2    She states that she has been on bactrim and amoxicillin for the last 10 days    She states the skin is all peeled off and she states that there looks like there is a stitch still left in there. She states that it just does not look good at all and she does not know what she should do?     Patient asking for a call for advise, pls call her at [phone]

## 2017-11-21 ENCOUNTER — OFFICE VISIT (OUTPATIENT)
Dept: CARDIOLOGY CLINIC | Age: 72
End: 2017-11-21

## 2017-11-21 VITALS
HEIGHT: 67 IN | WEIGHT: 229 LBS | DIASTOLIC BLOOD PRESSURE: 66 MMHG | BODY MASS INDEX: 35.94 KG/M2 | SYSTOLIC BLOOD PRESSURE: 122 MMHG | HEART RATE: 84 BPM

## 2017-11-21 DIAGNOSIS — I10 ESSENTIAL HYPERTENSION: Primary | ICD-10-CM

## 2017-11-21 DIAGNOSIS — E78.5 HYPERLIPIDEMIA, UNSPECIFIED HYPERLIPIDEMIA TYPE: ICD-10-CM

## 2017-11-21 PROCEDURE — 99213 OFFICE O/P EST LOW 20 MIN: CPT | Performed by: INTERNAL MEDICINE

## 2017-11-21 NOTE — LETTER
[facility]  [address]  [address] [address]  Phone: [phone]  Fax: [phone]    Librado Salcido MD        November 21, 2017     Kwaku Bonilla, [address] [place] [id number]    Patient: Kevin Brown  MR Number: [id number]  YOB: 1945  Date of Visit: 11/21/2017    Dear Dr. Kwaku Bonilla: Thank you for the request for consultation for Denis Michele to me for the evaluation of cad. Below are the relevant portions of my assessment and plan of care. [employer]   Cardiac Followup    Referring Provider:  Kwaku Bonilla MD     Chief Complaint   Patient presents with    Hypertension     No cardiac complaints    Hyperlipidemia    Coronary Artery Disease        History of Present Illness:  Ms. Aydin Feldman is here for follow up visit. She has a history of  Hypertension, hyperlipidemia. She states she has COPD and it is believed that it is secondary due to living in a household with parents who smoke. She is treated with Advair BID. Followed by Dr. Sheron Turner. She was intolerant of lisinopril with cough in the past.    Last visit she was started on crestor 10 mg daily, which she is tolerating well. She is occasionally doing water aerobics, knows she needs to be more active. Last spring had a knee replacement.  is currently in the hospital for an amputation of toe. Denies chest discomfort, SOB, change in exercise tolerance, palpitations or syncope. Patient has been advised on the importance of regular exercise of at least 20-30 minutes daily alternating with aerobic and isometric activities. Patient has been doing water aerobics at the [employer]. She had normal coronaries in 2012. Past Medical History:   has a past medical history of Anxiety; BCC (basal cell carcinoma of skin); COPD (chronic obstructive pulmonary disease) ([facility] [address]  Hyperlipidemia; the lungs every 6 hours as needed for Wheezing 1 Inhaler 11    glucose blood VI test strips (ONE TOUCH TEST STRIPS) strip 1 each by In Vitro route daily. Check FBS and 4 pm  each 3    ONE TOUCH LANCETS MISC Check FBS and 4 pm  each 3    Blood Glucose Monitoring Suppl (ONE TOUCH ULTRA 2) W/DEVICE KIT 1 kit by Does not apply route daily as needed. 1 kit 0    risperiDONE (RISPERDAL) 0.5 MG tablet Take 1 tablet by mouth nightly as needed. Pt is due for APPT 90 tablet 2     No facility-administered encounter medications on file as of 11/21/2017. Allergies:  Ace inhibitors; Actifed cold-allergy [chlorpheniramine-phenylephrine]; Cephalexin; Codeine; Erythromycin base; Lisinopril; Lovastatin; Sudafed [pseudoephedrine hcl]; and Trinalin [azatadine-pseudoephedrine]     Review of Systems:   · Constitutional: there has been no unanticipated weight loss. There's been no change in energy level, sleep pattern, or activity level. · Eyes: No visual changes or diplopia. No scleral icterus. · ENT: No Headaches, hearing loss or vertigo. No mouth sores or sore throat. · Cardiovascular: Reviewed in HPI  · Respiratory: No cough or wheezing, no sputum production. No hematemesis. · Gastrointestinal: No abdominal pain, appetite loss, blood in stools. No change in bowel or bladder habits. · Genitourinary: No dysuria, trouble voiding, or hematuria. · Musculoskeletal:  No gait disturbance, weakness or joint complaints. · Integumentary: No rash or pruritis. · Neurological: No headache, diplopia, change in muscle strength, numbness or tingling. No change in gait, balance, coordination, mood, affect, memory, mentation, behavior. · Psychiatric: No anxiety, no depression. · Endocrine: No malaise, fatigue or temperature intolerance. No excessive thirst, fluid intake, or urination. No tremor. · Hematologic/Lymphatic: No abnormal bruising or bleeding, blood clots or swollen lymph nodes. Emiliano Diego MD

## 2017-11-21 NOTE — PROGRESS NOTES
reports that she drinks alcohol. She reports that she does not use drugs. Family History:  family history includes COPD in her father; Cancer in her mother; Colon Cancer in her maternal grandmother; Heart Disease in her father, maternal grandfather, and mother; Stroke in her maternal grandfather. Home Medications:  Outpatient Encounter Prescriptions as of 11/21/2017   Medication Sig Dispense Refill    sulfamethoxazole-trimethoprim (BACTRIM DS) 800-160 MG per tablet Take 1 tablet by mouth every 12 hours for 10 days 20 tablet 0    amoxicillin-clavulanate (AUGMENTIN) 875-125 MG per tablet Take 1 tablet by mouth every 12 hours for 10 days 20 tablet 0    glimepiride (AMARYL) 2 MG tablet TAKE ONE TABLET BY MOUTH EVERY MORNING 30 tablet 3    aspirin 81 MG tablet Take 81 mg by mouth daily      metFORMIN, OSM, (FORTAMET) 500 MG extended release tablet Take 2 tablets by mouth 2 times daily (with meals) 360 tablet 3    atenolol (TENORMIN) 50 MG tablet TAKE 1 TABLET DAILY 90 tablet 0    FLUoxetine (PROZAC) 20 MG capsule TAKE 1 CAPSULE DAILY 90 capsule 0    losartan (COZAAR) 50 MG tablet TAKE 1 TABLET DAILY 90 tablet 0    umeclidinium-vilanterol (ANORO ELLIPTA) 62.5-25 MCG/INH AEPB inhaler Inhale 1 puff into the lungs daily 3 each 3    albuterol sulfate  (90 Base) MCG/ACT inhaler Inhale 2 puffs into the lungs every 6 hours as needed for Wheezing 1 Inhaler 11    glucose blood VI test strips (ONE TOUCH TEST STRIPS) strip 1 each by In Vitro route daily. Check FBS and 4 pm  each 3    ONE TOUCH LANCETS MISC Check FBS and 4 pm  each 3    Blood Glucose Monitoring Suppl (ONE TOUCH ULTRA 2) W/DEVICE KIT 1 kit by Does not apply route daily as needed. 1 kit 0    risperiDONE (RISPERDAL) 0.5 MG tablet Take 1 tablet by mouth nightly as needed. Pt is due for APPT 90 tablet 2     No facility-administered encounter medications on file as of 11/21/2017. Allergies:  Ace inhibitors;  Actifed cold-allergy [chlorpheniramine-phenylephrine]; Cephalexin; Codeine; Erythromycin base; Lisinopril; Lovastatin; Sudafed [pseudoephedrine hcl]; and Trinalin [azatadine-pseudoephedrine]     Review of Systems:   · Constitutional: there has been no unanticipated weight loss. There's been no change in energy level, sleep pattern, or activity level. · Eyes: No visual changes or diplopia. No scleral icterus. · ENT: No Headaches, hearing loss or vertigo. No mouth sores or sore throat. · Cardiovascular: Reviewed in HPI  · Respiratory: No cough or wheezing, no sputum production. No hematemesis. · Gastrointestinal: No abdominal pain, appetite loss, blood in stools. No change in bowel or bladder habits. · Genitourinary: No dysuria, trouble voiding, or hematuria. · Musculoskeletal:  No gait disturbance, weakness or joint complaints. · Integumentary: No rash or pruritis. · Neurological: No headache, diplopia, change in muscle strength, numbness or tingling. No change in gait, balance, coordination, mood, affect, memory, mentation, behavior. · Psychiatric: No anxiety, no depression. · Endocrine: No malaise, fatigue or temperature intolerance. No excessive thirst, fluid intake, or urination. No tremor. · Hematologic/Lymphatic: No abnormal bruising or bleeding, blood clots or swollen lymph nodes. · Allergic/Immunologic: No nasal congestion or hives. Physical Examination:       Constitutional and General Appearance: NAD   Respiratory:  · Normal excursion and expansion without use of accessory muscles  · Resp Auscultation: Normal breath sounds without dullness-prolonged expiratory phase.   Cardiovascular:  · The apical impulses not displaced  · Heart tones are crisp and normal  · Cervical veins are not engorged  · The carotid upstroke is normal in amplitude and contour without delay or bruit  · Normal S1S2, No S3, No Murmur  · Peripheral pulses are symmetrical and full  · There is no clubbing, cyanosis of the extremities. · No edema  · Femoral Arteries: 2+ and equal  · Pedal Pulses: 2+ and equal   Abdomen:  · No masses or tenderness  · Liver/Spleen: No Abnormalities Noted  Neurological/Psychiatric:  · Alert and oriented in all spheres  · Moves all extremities well  · Exhibits normal gait balance and coordination  · No abnormalities of mood, affect, memory, mentation, or behavior are noted      Assessment:     1. Hypertension: 142/94 , repeated 148/90   Better controlled at home. Stressed due to husbands current hospitalization, will continue to monitor at home, and call if consistently over 140   2. Hyperlipemia:   Results     HDL 84  LDL  161.   8/30/16     hdl 70 ldl 121 on crestor 10 mg daily, would like ldl under 100, will increase crestor to 20 mg and repeat in January with labs ordered by Dr Lashon Eng:  1. Patient has been advised on the importance of regular exercise of at least 20-30 minutes daily alternating with aerobic and isometric activities. 2.   OC BP ck--Controlled at present  3. PCP stopped Crestor due to elevated LFTs  4.    Follow up PRN       Thank you for allowing me to participate in the care of this individual.

## 2017-11-21 NOTE — COMMUNICATION BODY
[address]   Cardiac Followup    Referring Provider:  Verónica Garcia MD     Chief Complaint   Patient presents with    Hypertension     No cardiac complaints    Hyperlipidemia    Coronary Artery Disease        History of Present Illness:  Ms. Davis Cottrell is here for follow up visit. She has a history of  Hypertension, hyperlipidemia. She states she has COPD and it is believed that it is secondary due to living in a household with parents who smoke. She is treated with Advair BID. Followed by Dr. Etta Izquierdo. She was intolerant of lisinopril with cough in the past.    Last visit she was started on crestor 10 mg daily, which she is tolerating well. She is occasionally doing water aerobics, knows she needs to be more active. Last spring had a knee replacement.  is currently in the hospital for an amputation of toe. Denies chest discomfort, SOB, change in exercise tolerance, palpitations or syncope. Patient has been advised on the importance of regular exercise of at least 20-30 minutes daily alternating with aerobic and isometric activities. Patient has been doing water aerobics at the [employer]. She had normal coronaries in 2012. Past Medical History:   has a past medical history of Anxiety; BCC (basal cell carcinoma of skin); COPD (chronic obstructive pulmonary disease) ([facility] [address] Hyperlipidemia; Hypertension; Obesity; Osteoarthritis; Type 2 diabetes mellitus ([facility] [address] and Wears partial dentures. Surgical History:   has a past surgical history that includes Tonsillectomy; Spine surgery (02/07/2013); Colonoscopy; Dilation and curettage of uterus (N/A, 05/08/2015); Cardiac catheterization; Knee Arthroplasty (Right, 11/03/2015); Total knee arthroplasty (Left, [id number]); Carpal tunnel release (Right, 10/19/2017); Ulnar tunnel release (Right, 10/19/2017); and Carpal tunnel release (Left, 11/02/2017). Social History:   reports that she has never smoked.  She has never used smokeless tobacco. She extremities. · No edema  · Femoral Arteries: 2+ and equal  · Pedal Pulses: 2+ and equal   Abdomen:  · No masses or tenderness  · Liver/Spleen: No Abnormalities Noted  Neurological/Psychiatric:  · Alert and oriented in all spheres  · Moves all extremities well  · Exhibits normal gait balance and coordination  · No abnormalities of mood, affect, memory, mentation, or behavior are noted      Assessment:     1. Hypertension: 142/94 , repeated 148/90   Better controlled at home. Stressed due to husbands current hospitalization, will continue to monitor at home, and call if consistently over 140   2. Hyperlipemia:   Results     HDL 84  LDL  161.   8/30/16     hdl 70 ldl 121 on crestor 10 mg daily, would like ldl under 100, will increase crestor to 20 mg and repeat in January with labs ordered by Dr Anton Wan:  1. Patient has been advised on the importance of regular exercise of at least 20-30 minutes daily alternating with aerobic and isometric activities. 2.   OC BP ck--Controlled at present  3. PCP stopped Crestor due to elevated LFTs  4.    Follow up PRN       Thank you for allowing me to participate in the care of this individual.

## 2017-11-30 ENCOUNTER — TELEPHONE (OUTPATIENT)
Dept: ORTHOPEDIC SURGERY | Age: 72
End: 2017-11-30

## 2017-12-01 ENCOUNTER — OFFICE VISIT (OUTPATIENT)
Dept: ORTHOPEDIC SURGERY | Age: 72
End: 2017-12-01

## 2017-12-01 VITALS — HEIGHT: 67 IN | BODY MASS INDEX: 36.1 KG/M2 | WEIGHT: 230 LBS

## 2017-12-01 DIAGNOSIS — G56.03 BILATERAL CARPAL TUNNEL SYNDROME: Primary | ICD-10-CM

## 2017-12-01 PROCEDURE — 99024 POSTOP FOLLOW-UP VISIT: CPT | Performed by: ORTHOPAEDIC SURGERY

## 2017-12-01 NOTE — PROGRESS NOTES
Ms. Roger Romeo returns today in follow-up of her recent left Ulnar Nerve Decompression (Cubital Tunnel Release) & Carpal Tunnel Release done approximately 3 weeks ago. She has done well noting mild discomfort and no other reported complications. She has had slow healing of both wounds on the Left greater than Right     She notes pre-operative symptoms to be Improved at this time. Physical Exam:  Skin incision is slow healing. The Carpal Tunnel Release incision is clean dry and granulating. The Ulnar Nerve decompression at the Cubital Tunnel incision is moist, macerated and very slightly draining. There is no erythema, drainage, or sign of infection. Digital range of motion is full and equal bilateral.  Wrist range of motion is full and equal bilateral.  Elbow range of motion remains somewhat limited due to discomfort. Sensation is normal in the Whole Hand. Vascular examination reveals normal and good capillary refill. Swelling is mild. There is no clinical evidence of residual Median & Ulnar Nerve dysfuntion. Impression:  Ms. Roger Romeo is doing fairly well after recent left Ulnar Nerve Decompression (Cubital Tunnel Release) &  Carpal Tunnel Release. Plan:  Ms. Roger Romeo is instructed in work on Active & Passive range of motion of the digits, wrist, & elbow. These modalities were specifically demonstrated to her today. We discussed the appropriateness of gradual resumption of use of the operated hand and the return to normal use as comfort allows. She is given instructions regarding management of the fresh surgical incision and progressive use of desensitization and tissue massage techniques. We discussed the appropriate expectations and timeline for symptom improvement.     She is provided a written patient instruction sheet titled: wound care and soaks    I have asked Ms. Roger Romeo to follow-up with me, either by scheduling an appointment for approximately 2-4 weeks from now, or by contacting me by telephone over the next 2-4 weeks if her symptoms have not fully resolved or if she has not regained full & painless return of function. She is also specifically instructed to return to the office or call for an appointment sooner if her symptoms are changing or worsening prior to that time.

## 2017-12-01 NOTE — PATIENT INSTRUCTIONS
Protocol for Managing Open Wounds  Dr. Richie Pozo. [place]            1. Please remove all dressings so that you may see the skin fully. 2. Prepare a clean sink full of warm water (bath temperature). Add one to two squirts of liquid antibacterial soap. Insert hand and soak for approximately five minutes, making sure to exercise your motion of fingers and wrists fully while soaking in warm water. 3. After finished soaking, pat wound dry. 4. Apply dressing as instructed in the office. A. Layer #1 - Adaptic gauze. B. Layer #2 - Light cotton gauze. Nick Guillermo #3 - Tape or other covering. 5. I typically recommend soaking two to three times per day with a clean dressing change after each soak.

## 2017-12-06 ENCOUNTER — OFFICE VISIT (OUTPATIENT)
Dept: FAMILY MEDICINE CLINIC | Age: 72
End: 2017-12-06

## 2017-12-06 VITALS
OXYGEN SATURATION: 98 % | DIASTOLIC BLOOD PRESSURE: 78 MMHG | WEIGHT: 229 LBS | HEART RATE: 86 BPM | BODY MASS INDEX: 36.41 KG/M2 | SYSTOLIC BLOOD PRESSURE: 132 MMHG

## 2017-12-06 DIAGNOSIS — M54.2 NECK PAIN: Primary | ICD-10-CM

## 2017-12-06 PROCEDURE — 99213 OFFICE O/P EST LOW 20 MIN: CPT | Performed by: FAMILY MEDICINE

## 2017-12-06 RX ORDER — NAPROXEN 500 MG/1
500 TABLET ORAL 2 TIMES DAILY WITH MEALS
Qty: 60 TABLET | Refills: 1 | Status: SHIPPED | OUTPATIENT
Start: 2017-12-06 | End: 2018-04-05

## 2018-01-23 ENCOUNTER — OFFICE VISIT (OUTPATIENT)
Dept: PULMONOLOGY | Age: 73
End: 2018-01-23

## 2018-01-23 VITALS
BODY MASS INDEX: 37.68 KG/M2 | WEIGHT: 237 LBS | HEART RATE: 90 BPM | SYSTOLIC BLOOD PRESSURE: 147 MMHG | RESPIRATION RATE: 20 BRPM | DIASTOLIC BLOOD PRESSURE: 89 MMHG | OXYGEN SATURATION: 95 %

## 2018-01-23 DIAGNOSIS — J44.9 COPD, MODERATE (HCC): ICD-10-CM

## 2018-01-23 DIAGNOSIS — R06.02 SOB (SHORTNESS OF BREATH): Primary | ICD-10-CM

## 2018-01-23 DIAGNOSIS — I10 ESSENTIAL HYPERTENSION: Chronic | ICD-10-CM

## 2018-01-23 PROCEDURE — 99213 OFFICE O/P EST LOW 20 MIN: CPT | Performed by: INTERNAL MEDICINE

## 2018-01-23 NOTE — PROGRESS NOTES
[facility]  Progress Note  MD Vincent Mann Slade   YOB: 1945    Date of Visit:  1/23/2018    Assessment/Plan:  1. SOB (shortness of breath)  SOB with exertion is stable. 2. COPD, moderate ([address]  I have independently reviewed pulmonary function testing. PFT does show moderate obstructive lung disease    Anoro  Add Albuterol prn and in advance of activity  Repeat PFT    3. Essential hypertension  Stable      FOLLOW UP: 6 months      Chief Complaint   Patient presents with    6 Month Follow-Up     copd       HPI  The patient presents with a chief complaint of SOB with exertion. This is stable. No recent flare ups. Tolerating her inhaled medication. No Chest pain, Nausea or vomiting reported      Review of Systems  As documented in HPI     Physical Exam:  Well developed, well nourished  Alert and oriented  Sclera is clear  No cervical adenopathy  No JVD. Chest examination is clear. Cardiac examination reveals regular rate and rhythm without murmur, gallop or rub. The abdomen is soft, nontender and nondistended. There is no clubbing, cyanosis or edema of the extremities. There is no obvious skin rash. No focal neuro deficicts  Normal mood and affect    Allergies   Allergen Reactions    Ace Inhibitors      cough    Actifed Cold-Allergy [Chlorpheniramine-Phenylephrine]     Cephalexin Other (See Comments)     Yeast infection if taken over a long period of time    Codeine Other (See Comments)     dizziness    Erythromycin Base      Patient denies on 2/28/2013    Lisinopril Other (See Comments)     Cough     Lovastatin Other (See Comments)     Myalgias; mouth sores; ELEV LFT    Sudafed [Pseudoephedrine Hcl] Other (See Comments)     Shakes     Trinalin [Azatadine-Pseudoephedrine]      Prior to Visit Medications    Medication Sig Taking?  Authorizing Provider   FLUoxetine (PROZAC) 20 MG capsule TAKE 1 CAPSULE DAILY  Chino Giraldo

## 2018-02-20 ENCOUNTER — HOSPITAL ENCOUNTER (OUTPATIENT)
Dept: OTHER | Age: 73
Discharge: HOME | End: 2018-02-20
Attending: FAMILY MEDICINE | Admitting: FAMILY MEDICINE

## 2018-02-20 DIAGNOSIS — E11.9 TYPE 2 DIABETES MELLITUS WITHOUT COMPLICATION, WITHOUT LONG-TERM CURRENT USE OF INSULIN (HCC): ICD-10-CM

## 2018-02-20 DIAGNOSIS — I10 ESSENTIAL HYPERTENSION: Chronic | ICD-10-CM

## 2018-02-20 DIAGNOSIS — E78.5 HYPERLIPIDEMIA, UNSPECIFIED HYPERLIPIDEMIA TYPE: Chronic | ICD-10-CM

## 2018-02-20 LAB
A/G RATIO: 1.2 (ref 1.1–2.2)
ALBUMIN SERPL-MCNC: 4.2 G/DL (ref 3.4–5)
ALP BLD-CCNC: 123 U/L (ref 40–129)
ALT SERPL-CCNC: 36 U/L (ref 10–40)
ANION GAP SERPL CALCULATED.3IONS-SCNC: 15 MMOL/L (ref 3–16)
AST SERPL-CCNC: 30 U/L (ref 15–37)
BILIRUB SERPL-MCNC: 0.4 MG/DL (ref 0–1)
BUN BLDV-MCNC: 10 MG/DL (ref 7–20)
CALCIUM SERPL-MCNC: 9.8 MG/DL (ref 8.3–10.6)
CHLORIDE BLD-SCNC: 98 MMOL/L (ref 99–110)
CHOLESTEROL, TOTAL: 266 MG/DL (ref 0–199)
CO2: 28 MMOL/L (ref 21–32)
CREAT SERPL-MCNC: 0.6 MG/DL (ref 0.6–1.2)
CREATININE URINE: 138 MG/DL (ref 28–259)
ESTIMATED AVERAGE GLUCOSE: 145.6 MG/DL
GFR AFRICAN AMERICAN: >60
GFR NON-AFRICAN AMERICAN: >60
GLOBULIN: 3.4 G/DL
GLUCOSE BLD-MCNC: 115 MG/DL (ref 70–99)
HBA1C MFR BLD: 6.7 %
HDLC SERPL-MCNC: 75 MG/DL (ref 40–60)
LDL CHOLESTEROL CALCULATED: 167 MG/DL
MICROALBUMIN UR-MCNC: <1.2 MG/DL
MICROALBUMIN/CREAT UR-RTO: NORMAL MG/G (ref 0–30)
POTASSIUM SERPL-SCNC: 4.2 MMOL/L (ref 3.5–5.1)
SODIUM BLD-SCNC: 141 MMOL/L (ref 136–145)
TOTAL PROTEIN: 7.6 G/DL (ref 6.4–8.2)
TRIGL SERPL-MCNC: 119 MG/DL (ref 0–150)
VLDLC SERPL CALC-MCNC: 24 MG/DL

## 2018-02-21 ENCOUNTER — HOSPITAL ENCOUNTER (OUTPATIENT)
Dept: PULMONOLOGY | Age: 73
Discharge: HOME | End: 2018-02-21
Attending: INTERNAL MEDICINE | Admitting: INTERNAL MEDICINE

## 2018-02-21 VITALS — OXYGEN SATURATION: 96 % | RESPIRATION RATE: 18 BRPM | HEART RATE: 96 BPM

## 2018-02-21 DIAGNOSIS — J44.9 CHRONIC OBSTRUCTIVE PULMONARY DISEASE (HCC): ICD-10-CM

## 2018-02-21 RX ORDER — ALBUTEROL SULFATE 90 UG/1
4 AEROSOL, METERED RESPIRATORY (INHALATION) ONCE
Status: COMPLETED | OUTPATIENT
Start: 2018-02-21 | End: 2018-02-21

## 2018-02-21 RX ADMIN — ALBUTEROL SULFATE 4 PUFF: 90 AEROSOL, METERED RESPIRATORY (INHALATION) at 09:11

## 2018-02-23 ENCOUNTER — TELEPHONE (OUTPATIENT)
Dept: PULMONOLOGY | Age: 73
End: 2018-02-23

## 2018-02-23 ENCOUNTER — OFFICE VISIT (OUTPATIENT)
Dept: FAMILY MEDICINE CLINIC | Age: 73
End: 2018-02-23

## 2018-02-23 VITALS
HEART RATE: 86 BPM | DIASTOLIC BLOOD PRESSURE: 82 MMHG | WEIGHT: 234 LBS | BODY MASS INDEX: 37.2 KG/M2 | SYSTOLIC BLOOD PRESSURE: 138 MMHG | OXYGEN SATURATION: 96 %

## 2018-02-23 DIAGNOSIS — E11.9 TYPE 2 DIABETES MELLITUS WITHOUT COMPLICATION, WITHOUT LONG-TERM CURRENT USE OF INSULIN (HCC): ICD-10-CM

## 2018-02-23 DIAGNOSIS — J44.9 COPD, MODERATE (HCC): ICD-10-CM

## 2018-02-23 DIAGNOSIS — I10 ESSENTIAL HYPERTENSION: Primary | Chronic | ICD-10-CM

## 2018-02-23 DIAGNOSIS — E78.5 HYPERLIPIDEMIA, UNSPECIFIED HYPERLIPIDEMIA TYPE: Chronic | ICD-10-CM

## 2018-02-23 PROCEDURE — G8510 SCR DEP NEG, NO PLAN REQD: HCPCS | Performed by: FAMILY MEDICINE

## 2018-02-23 PROCEDURE — 99214 OFFICE O/P EST MOD 30 MIN: CPT | Performed by: FAMILY MEDICINE

## 2018-02-23 PROCEDURE — 3288F FALL RISK ASSESSMENT DOCD: CPT | Performed by: FAMILY MEDICINE

## 2018-02-23 RX ORDER — PRAVASTATIN SODIUM 10 MG
10 TABLET ORAL DAILY
Qty: 30 TABLET | Refills: 2 | Status: SHIPPED | OUTPATIENT
Start: 2018-02-23 | End: 2018-05-11 | Stop reason: SDUPTHER

## 2018-02-23 RX ORDER — GLIMEPIRIDE 2 MG/1
TABLET ORAL
Qty: 90 TABLET | Refills: 1 | Status: SHIPPED | OUTPATIENT
Start: 2018-02-23 | End: 2018-09-04 | Stop reason: SDUPTHER

## 2018-02-23 RX ORDER — METFORMIN HYDROCHLORIDE EXTENDED-RELEASE TABLETS 1000 MG/1
1000 TABLET, FILM COATED, EXTENDED RELEASE ORAL 2 TIMES DAILY WITH MEALS
Qty: 180 TABLET | Refills: 1 | Status: SHIPPED | OUTPATIENT
Start: 2018-02-23 | End: 2018-03-06

## 2018-02-23 ASSESSMENT — PATIENT HEALTH QUESTIONNAIRE - PHQ9
2. FEELING DOWN, DEPRESSED OR HOPELESS: 1
SUM OF ALL RESPONSES TO PHQ QUESTIONS 1-9: 0
SUM OF ALL RESPONSES TO PHQ9 QUESTIONS 1 & 2: 0
1. LITTLE INTEREST OR PLEASURE IN DOING THINGS: 0
SUM OF ALL RESPONSES TO PHQ QUESTIONS 1-9: 1
SUM OF ALL RESPONSES TO PHQ9 QUESTIONS 1 & 2: 1
2. FEELING DOWN, DEPRESSED OR HOPELESS: 0
1. LITTLE INTEREST OR PLEASURE IN DOING THINGS: 0

## 2018-02-23 ASSESSMENT — ENCOUNTER SYMPTOMS
COUGH: 0
SHORTNESS OF BREATH: 0

## 2018-02-23 NOTE — PROGRESS NOTES
Vince Henry  : 1945  Encounter date: 2018    This is a 71 y.o. female who presents for a chronic condition visit. Chief Complaint   Patient presents with    Follow-up     Patient is having diarrhea that she feels is caused by metformin. Takes risperdal on nights when she can't sleep. Has had this for years. Doesn't use this often. Has tried [place] but it made her very groggy. Didn't feel right in the morning. Doesn't really have so much trouble sleeping. Diabetes Mellitus Type 2: Current symptoms/problems include diarrhea. She was put on Fortamet from Dr. Liberty Benedict and this worked very well to help bring down sugars and she was able to tolerate. Insurance did not cover and she had trialed Glucophage XR, but this caused diarrhea as well. She has also tried the regular metformin, but she is having diarrhea multiple times a day and stool incontinence. Really cannot tolerate this short acting medication and is having diarrhea at least 4 episodes per day and can't make it to bathroom on time. One that worked was fortamet extended release 500mg and it would be 2 tabs BID. Medication compliance:  compliant all of the time  Diabetic diet compliance:  compliant most of the time,  Weight trend: stable  Current exercise: stays active  Barriers: medication side effects  Does try to check sugars at least once a day. Usually lowest she has seen was in low 100's. Home blood sugar records: fasting and post prandial; see above  Any episodes of hypoglycemia? no  Eye exam current (within one year): unknown   reports that she has never smoked. She has never used smokeless tobacco.   Daily Aspirin? Yes    Hypertension:  Home blood pressure monitoring: Yes - gets numbers similar to here when she checks. She does track - usually in 066'C systolic. She is adherent to a low sodium diet. Patient denies chest pain and shortness of breath.   Antihypertensive medication side effects: no medication side effects noted. Use of agents associated with hypertension: none. Hyperlipidemia:   Liver enzymes get elevated with statins she has tried in past - specifically lovastatin. Reviewed this elevation with her. She has also tried crestor. Is following up with Dr. Cody Lindo for COPD. Has done well with respiratory status. Uses the anoro daily.      A1C:  Lab Results   Component Value Date    LABA1C 6.7 02/20/2018    LABA1C 8.1 05/08/2017    LABA1C 7.4 08/30/2016     Micro:   Lab Results   Component Value Date    LABMICR <1.20 02/20/2018    LABMICR Not Indicated 02/19/2016    CREATININE 0.6 02/20/2018     CBC:   Lab Results   Component Value Date    WBC 9.1 02/19/2016    HGB 10.7 03/02/2016    HCT 34.2 03/02/2016    MCH 28.6 02/19/2016    MCHC 31.7 02/19/2016    RDW 14.3 02/19/2016     02/19/2016    MPV 9.0 02/19/2016     CMP:   Lab Results   Component Value Date     02/20/2018    K 4.2 02/20/2018    CL 98 02/20/2018    CO2 28 02/20/2018    ANIONGAP 15 02/20/2018    GLUCOSE 115 02/20/2018    BUN 10 02/20/2018    CREATININE 0.6 02/20/2018    GFRAA >60 02/20/2018    GFRAA >60 04/02/2013    CALCIUM 9.8 02/20/2018    PROT 7.6 02/20/2018    LABALBU 4.2 02/20/2018    AGRATIO 1.2 02/20/2018    BILITOT 0.4 02/20/2018    ALKPHOS 123 02/20/2018    ALT 36 02/20/2018    AST 30 02/20/2018    GLOB 3.4 02/20/2018     LIPID:   Lab Results   Component Value Date    CHOL 266 02/20/2018    TRIG 119 02/20/2018    HDL 75 02/20/2018    LDLCALC 167 02/20/2018    LABVLDL 24 02/20/2018              Allergies   Allergen Reactions    Ace Inhibitors      cough    Actifed Cold-Allergy [Chlorpheniramine-Phenylephrine]     Cephalexin Other (See Comments)     Yeast infection if taken over a long period of time    Codeine Other (See Comments)     dizziness    Erythromycin Base      Patient denies on 2/28/2013    Lisinopril Other (See Comments)     Cough     Lovastatin Other (See Comments)     Myalgias; mouth sores; ELEV LFT    She is using Anoro daily. 4. Type 2 diabetes mellitus without complication, without long-term current use of insulin (HCC)  A1c is quite good at present. We did discuss that the Amaryl as higher risk for her. Ideally I would like for her to be off of this medication. Insurance has not been covering the [employer]. I will try again through mail order to talk with her about side effects and problems she has had with the regular metformin's. If this medication is not covered I would consider changing therapy altogether to get rid of the Amaryl as well as metformin and consider medication like jardiance or even injectable like bydureon.  - glimepiride (AMARYL) 2 MG tablet; TAKE ONE TABLET BY MOUTH EVERY MORNING  Dispense: 90 tablet; Refill: 1  - metFORMIN, OSM, (FORTAMET) 1000 MG extended release tablet; Take 1 tablet by mouth 2 times daily (with meals)  Dispense: 180 tablet; Refill: 1      -continue current meds  -reviewed labs  -encouraged a healthy diet, regular exercise and maintaining a healthy weight  -eye form given No    Return in about 6 months (around 8/23/2018).

## 2018-03-02 ENCOUNTER — TELEPHONE (OUTPATIENT)
Dept: FAMILY MEDICINE CLINIC | Age: 73
End: 2018-03-02

## 2018-03-02 ENCOUNTER — OFFICE VISIT (OUTPATIENT)
Dept: ORTHOPEDIC SURGERY | Age: 73
End: 2018-03-02

## 2018-03-02 VITALS — HEIGHT: 67 IN | BODY MASS INDEX: 35.31 KG/M2 | WEIGHT: 225 LBS | RESPIRATION RATE: 16 BRPM

## 2018-03-02 DIAGNOSIS — M54.12 CERVICAL RADICULOPATHY: Primary | ICD-10-CM

## 2018-03-02 PROCEDURE — 99214 OFFICE O/P EST MOD 30 MIN: CPT | Performed by: ORTHOPAEDIC SURGERY

## 2018-03-02 RX ORDER — METHYLPREDNISOLONE 4 MG/1
4 TABLET ORAL SEE ADMIN INSTRUCTIONS
Qty: 1 KIT | Refills: 0 | Status: SHIPPED | OUTPATIENT
Start: 2018-03-02 | End: 2018-03-09

## 2018-03-02 NOTE — TELEPHONE ENCOUNTER
Yes that is ok. Does she have glucometer she can check fasting and 2 hour post prandial sugars with while she is taking it? That would be helpful. Watch carbs/sweets while taking it.

## 2018-03-02 NOTE — Clinical Note
Dear  Mami Mckinney MD,  Thank you very much for your referral or Ms. Aliyah Alston to me for evaluation and treatment of her Hand & Wrist condition. I appreciate your confidence in me and thank you for allowing me the opportunity to care for your patients. If I can be of any further assistance to you on this or any other patient, please do not hesitate to contact me. Sincerely,  Jessie Zaldivar.  Marlo Klinefelter, MD

## 2018-03-03 ENCOUNTER — TELEPHONE (OUTPATIENT)
Dept: FAMILY MEDICINE CLINIC | Age: 73
End: 2018-03-03

## 2018-03-03 NOTE — PATIENT INSTRUCTIONS
Thank you for choosing [facility]) Physicians for your Hand and Upper Extremity needs. If we can be of any further assistance to you, please do not hesitate to contact us.     Office Phone Number:  [phone]  or  [phone]

## 2018-03-03 NOTE — TELEPHONE ENCOUNTER
Patient is already on max dose of metformin 1000 mg twice a day.   Patient can increase glimepiride to 2 pills daily for the next 3 days

## 2018-03-06 ENCOUNTER — OFFICE VISIT (OUTPATIENT)
Dept: FAMILY MEDICINE CLINIC | Age: 73
End: 2018-03-06

## 2018-03-06 VITALS
BODY MASS INDEX: 36.18 KG/M2 | HEART RATE: 69 BPM | WEIGHT: 231 LBS | SYSTOLIC BLOOD PRESSURE: 170 MMHG | DIASTOLIC BLOOD PRESSURE: 100 MMHG | OXYGEN SATURATION: 95 %

## 2018-03-06 DIAGNOSIS — M54.2 NECK PAIN: Primary | ICD-10-CM

## 2018-03-06 DIAGNOSIS — G62.9 NEUROPATHY: ICD-10-CM

## 2018-03-06 DIAGNOSIS — M79.602 LEFT ARM PAIN: ICD-10-CM

## 2018-03-06 DIAGNOSIS — R29.898 WEAKNESS OF LEFT ARM: ICD-10-CM

## 2018-03-06 DIAGNOSIS — E11.9 TYPE 2 DIABETES MELLITUS WITHOUT COMPLICATION, WITHOUT LONG-TERM CURRENT USE OF INSULIN (HCC): ICD-10-CM

## 2018-03-06 PROCEDURE — 99213 OFFICE O/P EST LOW 20 MIN: CPT | Performed by: FAMILY MEDICINE

## 2018-03-06 RX ORDER — METFORMIN HYDROCHLORIDE 500 MG/1
1000 TABLET, EXTENDED RELEASE ORAL 2 TIMES DAILY
Qty: 120 TABLET | Refills: 2 | Status: SHIPPED | OUTPATIENT
Start: 2018-03-06 | End: 2018-06-18 | Stop reason: SDUPTHER

## 2018-03-06 NOTE — PROGRESS NOTES
for the 3/6/18 encounter (Office Visit) with Salina Alonso MD   Medication Sig Dispense Refill    metFORMIN (GLUCOPHAGE-XR) 500 MG extended release tablet Take 2 tablets by mouth 2 times daily 120 tablet 2    methylPREDNISolone (MEDROL, MALENA,) 4 MG tablet Take 1 tablet by mouth See Admin Instructions for 7 days Take by mouth. 1 kit 0    lidocaine (LIDODERM) 5 % Place 1 patch onto the skin daily 12 hours on, 12 hours off. 15 patch 0    glimepiride (AMARYL) 2 MG tablet TAKE ONE TABLET BY MOUTH EVERY MORNING 90 tablet 1    pravastatin (PRAVACHOL) 10 MG tablet Take 1 tablet by mouth daily 30 tablet 2    umeclidinium-vilanterol (ANORO ELLIPTA) 62.5-25 MCG/INH AEPB inhaler Inhale 1 puff into the lungs daily 3 each 3    FLUoxetine (PROZAC) 20 MG capsule TAKE 1 CAPSULE DAILY 90 capsule 0    losartan (COZAAR) 50 MG tablet TAKE 1 TABLET DAILY 90 tablet 0    atenolol (TENORMIN) 50 MG tablet TAKE 1 TABLET DAILY 90 tablet 0    naproxen (NAPROSYN) 500 MG tablet Take 1 tablet by mouth 2 times daily (with meals) 60 tablet 1    aspirin 81 MG tablet Take 81 mg by mouth daily      albuterol sulfate  (90 Base) MCG/ACT inhaler Inhale 2 puffs into the lungs every 6 hours as needed for Wheezing 1 Inhaler 11    glucose blood VI test strips (ONE TOUCH TEST STRIPS) strip 1 each by In Vitro route daily. Check FBS and 4 pm  each 3    ONE TOUCH LANCETS MISC Check FBS and 4 pm  each 3    Blood Glucose Monitoring Suppl (ONE TOUCH ULTRA 2) W/DEVICE KIT 1 kit by Does not apply route daily as needed. 1 kit 0       Review of Systems   Constitutional: Negative for chills, fatigue and fever. Musculoskeletal: Positive for neck pain and neck stiffness. Neurological: Positive for weakness and numbness. Negative for dizziness and headaches.        Objective:    BP (!) 170/100   Pulse 69   Wt 231 lb (104.8 kg)   LMP 03/01/1998   SpO2 95%   BMI 36.18 kg/m²   Weight: 231 lb (104.8 kg)     BP Readings from Last 3 Encounters:   03/06/18 (!) 170/100   02/27/18 (!) 161/91   02/23/18 138/82     Wt Readings from Last 3 Encounters:   03/06/18 231 lb (104.8 kg)   03/02/18 225 lb (102.1 kg)   02/27/18 225 lb (102.1 kg)       Physical Exam   Constitutional: She is oriented to person, place, and time. She appears well-developed and well-nourished. No distress. Musculoskeletal:   Increased pain with extension neck; pulling with flexion. Decreased right lateral turn; limited to 45 deg; decreased left lateral turn slightly. Neurological: She is alert and oriented to person, place, and time. Reflex Scores:       Tricep reflexes are 2+ on the right side and 1+ on the left side. Bicep reflexes are 2+ on the right side and 2+ on the left side. Brachioradialis reflexes are 2+ on the right side and 1+ on the left side. Decreased strength with abduction of all fingers left hand.  strength grossly normal and symmetric. 4+ out of 5 strength tricep left, 5 out of 5 strength tricep right. 5 out of 5 biceps strength bilateral. Normal strength in shoulder abduction bilateral. Increased pain control neck with axial loading. Increased pain central cervical spine with lateral tilt either side worsened with increased axial loading. Assessment/Plan    1. Neck pain  Will obtain x-ray to start evaluation. Although symptoms have improved, I'm concerned with weakness that she has on exam as well as neurological symptoms. I think it is worthwhile to further evaluate prior to her seeing specialist for this. 2. Left arm pain  See above    3. Neuropathy ([address]  See above    4. Weakness of left arm  See above    5. Type 2 diabetes mellitus without complication, without long-term current use of insulin (HCC)  Glucophage XR was sent to the pharmacy. We did receive a letter stating that her insurance would cover this medication.  - metFORMIN (GLUCOPHAGE-XR) 500 MG extended release tablet;  Take 2 tablets by mouth 2 times daily  Dispense:

## 2018-03-07 ENCOUNTER — HOSPITAL ENCOUNTER (OUTPATIENT)
Dept: OTHER | Age: 73
Discharge: HOME | End: 2018-03-07
Attending: FAMILY MEDICINE | Admitting: FAMILY MEDICINE

## 2018-03-07 ENCOUNTER — TELEPHONE (OUTPATIENT)
Dept: FAMILY MEDICINE CLINIC | Age: 73
End: 2018-03-07

## 2018-03-07 DIAGNOSIS — M54.2 NECK PAIN: ICD-10-CM

## 2018-03-12 ENCOUNTER — TELEPHONE (OUTPATIENT)
Dept: FAMILY MEDICINE CLINIC | Age: 73
End: 2018-03-12

## 2018-03-12 DIAGNOSIS — M54.2 NECK PAIN: Primary | ICD-10-CM

## 2018-03-12 NOTE — TELEPHONE ENCOUNTER
Pt states [facility] has not contacted her for her MRI. Would like to know what she needs to do?

## 2018-03-14 ENCOUNTER — OFFICE VISIT (OUTPATIENT)
Dept: ORTHOPEDIC SURGERY | Age: 73
End: 2018-03-14

## 2018-03-14 VITALS
HEIGHT: 67 IN | DIASTOLIC BLOOD PRESSURE: 93 MMHG | SYSTOLIC BLOOD PRESSURE: 151 MMHG | HEART RATE: 78 BPM | WEIGHT: 231 LBS | BODY MASS INDEX: 36.26 KG/M2

## 2018-03-14 DIAGNOSIS — M54.12 CERVICAL RADICULOPATHY: Primary | ICD-10-CM

## 2018-03-14 PROCEDURE — 99213 OFFICE O/P EST LOW 20 MIN: CPT | Performed by: NURSE PRACTITIONER

## 2018-03-14 RX ORDER — GABAPENTIN 300 MG/1
300 CAPSULE ORAL 3 TIMES DAILY PRN
Qty: 40 CAPSULE | Refills: 0 | Status: SHIPPED | OUTPATIENT
Start: 2018-03-14 | End: 2019-01-07

## 2018-03-16 ENCOUNTER — TELEPHONE (OUTPATIENT)
Dept: ORTHOPEDIC SURGERY | Age: 73
End: 2018-03-16

## 2018-03-16 ENCOUNTER — HOSPITAL ENCOUNTER (OUTPATIENT)
Dept: MRI IMAGING | Age: 73
Discharge: HOME | End: 2018-03-16
Attending: FAMILY MEDICINE | Admitting: FAMILY MEDICINE

## 2018-03-16 DIAGNOSIS — M54.2 NECK PAIN: ICD-10-CM

## 2018-03-16 DIAGNOSIS — M54.2 CERVICALGIA: ICD-10-CM

## 2018-03-16 RX ORDER — PREDNISONE 10 MG/1
TABLET ORAL
Qty: 42 TABLET | Refills: 0 | Status: SHIPPED | OUTPATIENT
Start: 2018-03-16 | End: 2018-04-05

## 2018-03-20 ENCOUNTER — OFFICE VISIT (OUTPATIENT)
Dept: ORTHOPEDIC SURGERY | Age: 73
End: 2018-03-20

## 2018-03-20 VITALS
HEART RATE: 72 BPM | SYSTOLIC BLOOD PRESSURE: 141 MMHG | BODY MASS INDEX: 36.26 KG/M2 | DIASTOLIC BLOOD PRESSURE: 81 MMHG | WEIGHT: 231 LBS | HEIGHT: 67 IN

## 2018-03-20 DIAGNOSIS — M47.12 CERVICAL SPONDYLOSIS WITH MYELOPATHY: Primary | ICD-10-CM

## 2018-03-20 PROCEDURE — L0172 CERV COL SR FOAM 2PC PRE OTS: HCPCS | Performed by: ORTHOPAEDIC SURGERY

## 2018-03-20 PROCEDURE — 99213 OFFICE O/P EST LOW 20 MIN: CPT | Performed by: ORTHOPAEDIC SURGERY

## 2018-03-20 RX ORDER — TRAMADOL HYDROCHLORIDE 50 MG/1
50 TABLET ORAL EVERY 8 HOURS PRN
Qty: 40 TABLET | Refills: 0 | Status: SHIPPED | OUTPATIENT
Start: 2018-03-20 | End: 2018-04-05

## 2018-03-21 ENCOUNTER — TELEPHONE (OUTPATIENT)
Dept: ORTHOPEDIC SURGERY | Age: 73
End: 2018-03-21

## 2018-03-21 NOTE — TELEPHONE ENCOUNTER
3/21/18  [facility] [place]    -  NO PRECERT REQUIRED - PER RY -   REF #[id number] RY GUZMAN

## 2018-03-27 ENCOUNTER — TELEPHONE (OUTPATIENT)
Dept: ORTHOPEDIC SURGERY | Age: 73
End: 2018-03-27

## 2018-03-27 DIAGNOSIS — M48.061 SPINAL STENOSIS OF LUMBAR REGION WITHOUT NEUROGENIC CLAUDICATION: Primary | ICD-10-CM

## 2018-03-27 RX ORDER — HYDROCODONE BITARTRATE AND ACETAMINOPHEN 5; 325 MG/1; MG/1
1 TABLET ORAL EVERY 8 HOURS PRN
Qty: 30 TABLET | Refills: 0 | Status: SHIPPED | OUTPATIENT
Start: 2018-03-27 | End: 2018-04-06

## 2018-03-27 NOTE — TELEPHONE ENCOUNTER
Pt is scheduled for cervical lami and fusion on 4/18/2018. Reviewed OARRS. Pt filled Tramadol on 3/24 and was given 13 days worth. Please advise.

## 2018-03-29 DIAGNOSIS — M47.12 CERVICAL SPONDYLOSIS WITH MYELOPATHY: ICD-10-CM

## 2018-03-29 DIAGNOSIS — M54.2 NECK PAIN: Primary | ICD-10-CM

## 2018-03-30 ENCOUNTER — HOSPITAL ENCOUNTER (OUTPATIENT)
Dept: OTHER | Age: 73
Discharge: HOME | End: 2018-03-30
Attending: ORTHOPAEDIC SURGERY | Admitting: ORTHOPAEDIC SURGERY

## 2018-03-30 ENCOUNTER — TELEPHONE (OUTPATIENT)
Dept: ORTHOPEDIC SURGERY | Age: 73
End: 2018-03-30

## 2018-03-30 DIAGNOSIS — M47.12 CERVICAL SPONDYLOSIS WITH MYELOPATHY: ICD-10-CM

## 2018-03-30 DIAGNOSIS — M47.12 CERVICAL SPONDYLOSIS WITH MYELOPATHY: Primary | ICD-10-CM

## 2018-03-30 DIAGNOSIS — M54.2 NECK PAIN: ICD-10-CM

## 2018-03-30 LAB
C-REACTIVE PROTEIN: 21.6 MG/L (ref 0–5.1)
SEDIMENTATION RATE, ERYTHROCYTE: 31 MM/HR (ref 0–30)

## 2018-04-04 ENCOUNTER — HOSPITAL ENCOUNTER (OUTPATIENT)
Dept: OTHER | Age: 73
Discharge: HOME | End: 2018-04-04
Attending: ORTHOPAEDIC SURGERY | Admitting: ORTHOPAEDIC SURGERY

## 2018-04-04 DIAGNOSIS — M47.12 CERVICAL SPONDYLOSIS WITH MYELOPATHY: ICD-10-CM

## 2018-04-04 LAB
C-REACTIVE PROTEIN: 16.9 MG/L (ref 0–5.1)
SEDIMENTATION RATE, ERYTHROCYTE: 40 MM/HR (ref 0–30)

## 2018-04-05 ENCOUNTER — OFFICE VISIT (OUTPATIENT)
Dept: FAMILY MEDICINE CLINIC | Age: 73
End: 2018-04-05

## 2018-04-05 VITALS
DIASTOLIC BLOOD PRESSURE: 92 MMHG | TEMPERATURE: 97.6 F | HEART RATE: 79 BPM | BODY MASS INDEX: 36.96 KG/M2 | OXYGEN SATURATION: 95 % | SYSTOLIC BLOOD PRESSURE: 152 MMHG | WEIGHT: 236 LBS

## 2018-04-05 DIAGNOSIS — J44.9 COPD, MODERATE (HCC): ICD-10-CM

## 2018-04-05 DIAGNOSIS — Z01.818 PREOPERATIVE EXAMINATION: Primary | ICD-10-CM

## 2018-04-05 DIAGNOSIS — M47.12 CERVICAL SPONDYLOSIS WITH MYELOPATHY: ICD-10-CM

## 2018-04-05 DIAGNOSIS — E78.5 HYPERLIPIDEMIA, UNSPECIFIED HYPERLIPIDEMIA TYPE: Chronic | ICD-10-CM

## 2018-04-05 DIAGNOSIS — I10 ESSENTIAL HYPERTENSION: Chronic | ICD-10-CM

## 2018-04-05 DIAGNOSIS — E11.9 TYPE 2 DIABETES MELLITUS WITHOUT COMPLICATION, WITHOUT LONG-TERM CURRENT USE OF INSULIN (HCC): ICD-10-CM

## 2018-04-05 DIAGNOSIS — E11.9 TYPE 2 DIABETES MELLITUS WITHOUT COMPLICATION, WITHOUT LONG-TERM CURRENT USE OF INSULIN (HCC): Primary | ICD-10-CM

## 2018-04-05 LAB
ANION GAP SERPL CALCULATED.3IONS-SCNC: 18 MMOL/L (ref 3–16)
BUN BLDV-MCNC: 13 MG/DL (ref 7–20)
CALCIUM SERPL-MCNC: 9.1 MG/DL (ref 8.3–10.6)
CHLORIDE BLD-SCNC: 98 MMOL/L (ref 99–110)
CO2: 22 MMOL/L (ref 21–32)
CREAT SERPL-MCNC: 0.7 MG/DL (ref 0.6–1.2)
GFR AFRICAN AMERICAN: >60
GFR NON-AFRICAN AMERICAN: >60
GLUCOSE BLD-MCNC: 266 MG/DL (ref 70–99)
HCT VFR BLD CALC: 40 % (ref 36–48)
HEMOGLOBIN: 13.3 G/DL (ref 12–16)
MCH RBC QN AUTO: 29.8 PG (ref 26–34)
MCHC RBC AUTO-ENTMCNC: 33.2 G/DL (ref 31–36)
MCV RBC AUTO: 89.9 FL (ref 80–100)
PDW BLD-RTO: 14.9 % (ref 12.4–15.4)
PLATELET # BLD: 309 K/UL (ref 135–450)
PMV BLD AUTO: 9 FL (ref 5–10.5)
POTASSIUM SERPL-SCNC: 4.2 MMOL/L (ref 3.5–5.1)
RBC # BLD: 4.45 M/UL (ref 4–5.2)
SODIUM BLD-SCNC: 138 MMOL/L (ref 136–145)
WBC # BLD: 9.9 K/UL (ref 4–11)

## 2018-04-05 PROCEDURE — 99214 OFFICE O/P EST MOD 30 MIN: CPT | Performed by: FAMILY MEDICINE

## 2018-04-05 PROCEDURE — 93000 ELECTROCARDIOGRAM COMPLETE: CPT | Performed by: FAMILY MEDICINE

## 2018-04-05 RX ORDER — GABAPENTIN 300 MG/1
300 CAPSULE ORAL 3 TIMES DAILY
Qty: 90 CAPSULE | Refills: 2 | Status: ON HOLD | OUTPATIENT
Start: 2018-04-05 | End: 2018-04-18 | Stop reason: HOSPADM

## 2018-04-05 RX ORDER — LOSARTAN POTASSIUM 50 MG/1
100 TABLET ORAL DAILY
Qty: 90 TABLET | Refills: 0
Start: 2018-04-05 | End: 2018-07-25

## 2018-04-06 ENCOUNTER — TELEPHONE (OUTPATIENT)
Dept: ORTHOPEDIC SURGERY | Age: 73
End: 2018-04-06

## 2018-04-06 DIAGNOSIS — M51.36 DEGENERATIVE DISC DISEASE, LUMBAR: Primary | ICD-10-CM

## 2018-04-09 ENCOUNTER — TELEPHONE (OUTPATIENT)
Dept: PAIN MANAGEMENT | Age: 73
End: 2018-04-09

## 2018-04-09 ENCOUNTER — TELEPHONE (OUTPATIENT)
Dept: ORTHOPEDIC SURGERY | Age: 73
End: 2018-04-09

## 2018-04-12 ENCOUNTER — HOSPITAL ENCOUNTER (OUTPATIENT)
Dept: GENERAL RADIOLOGY | Age: 73
Discharge: HOME | End: 2018-04-12
Admitting: FAMILY MEDICINE

## 2018-04-12 VITALS
WEIGHT: 230 LBS | DIASTOLIC BLOOD PRESSURE: 81 MMHG | HEIGHT: 68 IN | RESPIRATION RATE: 15 BRPM | TEMPERATURE: 97.4 F | SYSTOLIC BLOOD PRESSURE: 126 MMHG | OXYGEN SATURATION: 95 % | HEART RATE: 75 BPM | BODY MASS INDEX: 34.86 KG/M2

## 2018-04-12 DIAGNOSIS — M54.2 CERVICALGIA: ICD-10-CM

## 2018-04-12 DIAGNOSIS — M47.12 CERVICAL SPONDYLOSIS WITH MYELOPATHY: ICD-10-CM

## 2018-04-12 DIAGNOSIS — M54.2 NECK PAIN: ICD-10-CM

## 2018-04-12 LAB
APTT: 31 SEC (ref 24.1–34.9)
INR BLD: 1.07 (ref 0.85–1.15)
PROTHROMBIN TIME: 12.1 SEC (ref 9.6–13)

## 2018-04-12 RX ORDER — LIDOCAINE HYDROCHLORIDE 10 MG/ML
5 INJECTION, SOLUTION EPIDURAL; INFILTRATION; INTRACAUDAL; PERINEURAL ONCE
Status: COMPLETED | OUTPATIENT
Start: 2018-04-12 | End: 2018-04-12

## 2018-04-12 RX ORDER — ACETAMINOPHEN 325 MG/1
650 TABLET ORAL EVERY 4 HOURS PRN
Status: DISCONTINUED | OUTPATIENT
Start: 2018-04-12 | End: 2018-04-13 | Stop reason: HOSPADM

## 2018-04-12 RX ADMIN — LIDOCAINE HYDROCHLORIDE 5 ML: 10 INJECTION, SOLUTION EPIDURAL; INFILTRATION; INTRACAUDAL; PERINEURAL at 12:34

## 2018-04-12 RX ADMIN — LIDOCAINE HYDROCHLORIDE 5 ML: 10 INJECTION, SOLUTION EPIDURAL; INFILTRATION; INTRACAUDAL; PERINEURAL at 12:33

## 2018-04-12 ASSESSMENT — PAIN - FUNCTIONAL ASSESSMENT: PAIN_FUNCTIONAL_ASSESSMENT: 0-10

## 2018-04-16 ENCOUNTER — TELEPHONE (OUTPATIENT)
Dept: ORTHOPEDIC SURGERY | Age: 73
End: 2018-04-16

## 2018-04-17 ENCOUNTER — OFFICE VISIT (OUTPATIENT)
Dept: ORTHOPEDIC SURGERY | Age: 73
End: 2018-04-17

## 2018-04-17 ENCOUNTER — HOSPITAL ENCOUNTER (OUTPATIENT)
Dept: OTHER | Age: 73
Discharge: HOME | End: 2018-04-17
Attending: ORTHOPAEDIC SURGERY | Admitting: ORTHOPAEDIC SURGERY

## 2018-04-17 VITALS
WEIGHT: 231 LBS | BODY MASS INDEX: 36.26 KG/M2 | HEART RATE: 76 BPM | HEIGHT: 67 IN | DIASTOLIC BLOOD PRESSURE: 91 MMHG | SYSTOLIC BLOOD PRESSURE: 145 MMHG

## 2018-04-17 DIAGNOSIS — M47.12 CERVICAL SPONDYLOSIS WITH MYELOPATHY AND RADICULOPATHY: Primary | ICD-10-CM

## 2018-04-17 DIAGNOSIS — M47.22 CERVICAL SPONDYLOSIS WITH MYELOPATHY AND RADICULOPATHY: Primary | ICD-10-CM

## 2018-04-17 DIAGNOSIS — M51.36 DEGENERATIVE DISC DISEASE, LUMBAR: ICD-10-CM

## 2018-04-17 LAB
ABO/RH: NORMAL
ANTIBODY SCREEN: NORMAL
C-REACTIVE PROTEIN: 21.4 MG/L (ref 0–5.1)
GLUCOSE BLD-MCNC: 105 MG/DL (ref 70–99)
SEDIMENTATION RATE, ERYTHROCYTE: 35 MM/HR (ref 0–30)

## 2018-04-17 PROCEDURE — 99213 OFFICE O/P EST LOW 20 MIN: CPT | Performed by: ORTHOPAEDIC SURGERY

## 2018-04-18 PROBLEM — M47.12 OSTEOARTHRITIS OF CERVICAL SPINE WITH MYELOPATHY: Status: ACTIVE | Noted: 2018-04-18

## 2018-04-21 ENCOUNTER — CARE COORDINATION (OUTPATIENT)
Dept: CASE MANAGEMENT | Age: 73
End: 2018-04-21

## 2018-04-24 ENCOUNTER — CARE COORDINATION (OUTPATIENT)
Dept: CASE MANAGEMENT | Age: 73
End: 2018-04-24

## 2018-04-25 ENCOUNTER — TELEPHONE (OUTPATIENT)
Dept: ORTHOPEDIC SURGERY | Age: 73
End: 2018-04-25

## 2018-04-26 ENCOUNTER — TELEPHONE (OUTPATIENT)
Dept: ORTHOPEDIC SURGERY | Age: 73
End: 2018-04-26

## 2018-04-26 DIAGNOSIS — M47.12 OSTEOARTHRITIS OF CERVICAL SPINE WITH MYELOPATHY: ICD-10-CM

## 2018-04-26 RX ORDER — OXYCODONE HYDROCHLORIDE AND ACETAMINOPHEN 5; 325 MG/1; MG/1
TABLET ORAL
Qty: 50 TABLET | Refills: 0 | Status: SHIPPED | OUTPATIENT
Start: 2018-04-26 | End: 2018-05-15 | Stop reason: SDUPTHER

## 2018-04-30 ENCOUNTER — CARE COORDINATION (OUTPATIENT)
Dept: CASE MANAGEMENT | Age: 73
End: 2018-04-30

## 2018-05-01 ENCOUNTER — OFFICE VISIT (OUTPATIENT)
Dept: ORTHOPEDIC SURGERY | Age: 73
End: 2018-05-01

## 2018-05-01 VITALS — WEIGHT: 231 LBS | BODY MASS INDEX: 36.26 KG/M2 | HEIGHT: 67 IN

## 2018-05-01 DIAGNOSIS — Z98.1 S/P CERVICAL SPINAL FUSION: Primary | ICD-10-CM

## 2018-05-01 PROCEDURE — 99024 POSTOP FOLLOW-UP VISIT: CPT | Performed by: ORTHOPAEDIC SURGERY

## 2018-05-01 RX ORDER — CYCLOBENZAPRINE HCL 10 MG
10 TABLET ORAL 3 TIMES DAILY PRN
Qty: 40 TABLET | Refills: 1 | Status: SHIPPED | OUTPATIENT
Start: 2018-05-01 | End: 2018-05-11

## 2018-05-03 ENCOUNTER — CARE COORDINATION (OUTPATIENT)
Dept: CASE MANAGEMENT | Age: 73
End: 2018-05-03

## 2018-05-10 ENCOUNTER — TELEPHONE (OUTPATIENT)
Dept: ORTHOPEDIC SURGERY | Age: 73
End: 2018-05-10

## 2018-05-11 DIAGNOSIS — E78.5 HYPERLIPIDEMIA, UNSPECIFIED HYPERLIPIDEMIA TYPE: Chronic | ICD-10-CM

## 2018-05-11 RX ORDER — PRAVASTATIN SODIUM 10 MG
TABLET ORAL
Qty: 30 TABLET | Refills: 1 | Status: SHIPPED | OUTPATIENT
Start: 2018-05-11 | End: 2018-06-12 | Stop reason: SDUPTHER

## 2018-05-15 ENCOUNTER — TELEPHONE (OUTPATIENT)
Dept: ORTHOPEDIC SURGERY | Age: 73
End: 2018-05-15

## 2018-05-15 DIAGNOSIS — M47.12 OSTEOARTHRITIS OF CERVICAL SPINE WITH MYELOPATHY: ICD-10-CM

## 2018-05-15 RX ORDER — OXYCODONE HYDROCHLORIDE AND ACETAMINOPHEN 5; 325 MG/1; MG/1
TABLET ORAL
Qty: 50 TABLET | Refills: 0 | Status: SHIPPED | OUTPATIENT
Start: 2018-05-15 | End: 2018-06-05

## 2018-06-04 RX ORDER — FLUOXETINE HYDROCHLORIDE 20 MG/1
CAPSULE ORAL
Qty: 90 CAPSULE | Refills: 3
Start: 2018-06-04 | End: 2018-06-12 | Stop reason: SDUPTHER

## 2018-06-05 ENCOUNTER — OFFICE VISIT (OUTPATIENT)
Dept: ORTHOPEDIC SURGERY | Age: 73
End: 2018-06-05

## 2018-06-05 ENCOUNTER — PRE-EVALUATION (OUTPATIENT)
Dept: ORTHOPEDIC SURGERY | Age: 73
End: 2018-06-05

## 2018-06-05 VITALS — WEIGHT: 231 LBS | HEIGHT: 67 IN | BODY MASS INDEX: 36.26 KG/M2

## 2018-06-05 DIAGNOSIS — Z98.1 S/P CERVICAL SPINAL FUSION: Primary | ICD-10-CM

## 2018-06-05 DIAGNOSIS — M48.02 CERVICAL STENOSIS OF SPINAL CANAL: ICD-10-CM

## 2018-06-05 PROCEDURE — 99024 POSTOP FOLLOW-UP VISIT: CPT | Performed by: ORTHOPAEDIC SURGERY

## 2018-06-05 PROCEDURE — APPSS15 APP SPLIT SHARED TIME 0-15 MINUTES: Performed by: PHYSICIAN ASSISTANT

## 2018-06-11 ENCOUNTER — OFFICE VISIT (OUTPATIENT)
Dept: DERMATOLOGY | Age: 73
End: 2018-06-11

## 2018-06-11 DIAGNOSIS — Z85.828 HISTORY OF BASAL CELL CARCINOMA: ICD-10-CM

## 2018-06-11 DIAGNOSIS — L21.9 SEBORRHEIC DERMATITIS: Primary | ICD-10-CM

## 2018-06-11 DIAGNOSIS — L57.0 AK (ACTINIC KERATOSIS): ICD-10-CM

## 2018-06-11 DIAGNOSIS — D48.5 NEOPLASM OF UNCERTAIN BEHAVIOR OF SKIN: ICD-10-CM

## 2018-06-11 PROCEDURE — 17000 DESTRUCT PREMALG LESION: CPT | Performed by: DERMATOLOGY

## 2018-06-11 PROCEDURE — 99213 OFFICE O/P EST LOW 20 MIN: CPT | Performed by: DERMATOLOGY

## 2018-06-11 PROCEDURE — 11100 PR BIOPSY OF SKIN LESION: CPT | Performed by: DERMATOLOGY

## 2018-06-12 DIAGNOSIS — E78.5 HYPERLIPIDEMIA, UNSPECIFIED HYPERLIPIDEMIA TYPE: Chronic | ICD-10-CM

## 2018-06-12 RX ORDER — PRAVASTATIN SODIUM 10 MG
10 TABLET ORAL DAILY
Qty: 90 TABLET | Refills: 3 | Status: SHIPPED | OUTPATIENT
Start: 2018-06-12 | End: 2018-07-31 | Stop reason: SDUPTHER

## 2018-06-15 ENCOUNTER — TELEPHONE (OUTPATIENT)
Dept: DERMATOLOGY | Age: 73
End: 2018-06-15

## 2018-06-17 ENCOUNTER — TELEPHONE (OUTPATIENT)
Dept: FAMILY MEDICINE CLINIC | Age: 73
End: 2018-06-17

## 2018-06-17 DIAGNOSIS — E11.9 TYPE 2 DIABETES MELLITUS WITHOUT COMPLICATION, WITHOUT LONG-TERM CURRENT USE OF INSULIN (HCC): ICD-10-CM

## 2018-06-19 RX ORDER — METFORMIN HYDROCHLORIDE 500 MG/1
TABLET, EXTENDED RELEASE ORAL
Qty: 120 TABLET | Refills: 1 | Status: SHIPPED | OUTPATIENT
Start: 2018-06-19 | End: 2018-08-28 | Stop reason: SDUPTHER

## 2018-07-06 ENCOUNTER — HOSPITAL ENCOUNTER (OUTPATIENT)
Dept: MAMMOGRAPHY | Age: 73
Discharge: HOME | End: 2018-07-06
Attending: OBSTETRICS & GYNECOLOGY | Admitting: OBSTETRICS & GYNECOLOGY

## 2018-07-06 DIAGNOSIS — Z12.31 VISIT FOR SCREENING MAMMOGRAM: ICD-10-CM

## 2018-07-11 ENCOUNTER — TELEPHONE (OUTPATIENT)
Dept: FAMILY MEDICINE CLINIC | Age: 73
End: 2018-07-11

## 2018-07-25 DIAGNOSIS — E78.5 HYPERLIPIDEMIA, UNSPECIFIED HYPERLIPIDEMIA TYPE: Chronic | ICD-10-CM

## 2018-07-25 RX ORDER — PRAVASTATIN SODIUM 10 MG
TABLET ORAL
Qty: 30 TABLET | Refills: 0 | OUTPATIENT
Start: 2018-07-25

## 2018-07-31 ENCOUNTER — TELEPHONE (OUTPATIENT)
Dept: FAMILY MEDICINE CLINIC | Age: 73
End: 2018-07-31

## 2018-07-31 DIAGNOSIS — E78.5 HYPERLIPIDEMIA, UNSPECIFIED HYPERLIPIDEMIA TYPE: Chronic | ICD-10-CM

## 2018-07-31 RX ORDER — PRAVASTATIN SODIUM 10 MG
10 TABLET ORAL DAILY
Qty: 30 TABLET | Refills: 0 | Status: SHIPPED | OUTPATIENT
Start: 2018-07-31 | End: 2018-08-27 | Stop reason: SDUPTHER

## 2018-07-31 NOTE — TELEPHONE ENCOUNTER
Informed patient that she had 4 re-fills available at [address] [place] [address], which she was unaware of. I spoke to [facility] and they advised if she needed it by Friday, we should call in to local pharmacy because it would take 7 -10 days to get to her. Patient will call [facility] when she returns from vacation.

## 2018-07-31 NOTE — TELEPHONE ENCOUNTER
pravastatin (PRAVACHOL) 10 MG tablet 90 tablet 3 6/12/2018     Sig - Route: Take 1 tablet by mouth daily - Oral      Pt states that she need to have the above script filled. She has 2 pills at this time. Advised Dr Ania Martinez is out of office til 8/7. Pt will be leaving on vacation on the morning of 8/3.

## 2018-08-01 NOTE — TELEPHONE ENCOUNTER
Pt states she called [facility] and they advised they have not received script. Pt leaves on vacation in the am.     Please resend.

## 2018-08-13 ENCOUNTER — TELEPHONE (OUTPATIENT)
Dept: FAMILY MEDICINE CLINIC | Age: 73
End: 2018-08-13

## 2018-08-14 ENCOUNTER — TELEPHONE (OUTPATIENT)
Dept: ORTHOPEDIC SURGERY | Age: 73
End: 2018-08-14

## 2018-08-14 ENCOUNTER — OFFICE VISIT (OUTPATIENT)
Dept: ORTHOPEDIC SURGERY | Age: 73
End: 2018-08-14

## 2018-08-14 VITALS
HEIGHT: 67 IN | SYSTOLIC BLOOD PRESSURE: 157 MMHG | WEIGHT: 231 LBS | BODY MASS INDEX: 36.26 KG/M2 | HEART RATE: 72 BPM | DIASTOLIC BLOOD PRESSURE: 90 MMHG

## 2018-08-14 DIAGNOSIS — M47.22 CERVICAL SPONDYLOSIS WITH RADICULOPATHY: Primary | ICD-10-CM

## 2018-08-14 DIAGNOSIS — Z98.1 S/P CERVICAL SPINAL FUSION: ICD-10-CM

## 2018-08-14 DIAGNOSIS — E11.9 TYPE 2 DIABETES MELLITUS WITHOUT COMPLICATION, WITHOUT LONG-TERM CURRENT USE OF INSULIN (HCC): Primary | ICD-10-CM

## 2018-08-14 DIAGNOSIS — E78.5 HYPERLIPIDEMIA, UNSPECIFIED HYPERLIPIDEMIA TYPE: Chronic | ICD-10-CM

## 2018-08-14 PROCEDURE — 99213 OFFICE O/P EST LOW 20 MIN: CPT | Performed by: ORTHOPAEDIC SURGERY

## 2018-08-14 RX ORDER — GABAPENTIN 300 MG/1
300 CAPSULE ORAL 4 TIMES DAILY
Qty: 90 CAPSULE | Refills: 1 | Status: SHIPPED | OUTPATIENT
Start: 2018-08-14 | End: 2018-10-03 | Stop reason: SDUPTHER

## 2018-08-14 NOTE — PROGRESS NOTES
Ms. Speedy Alvarez returns today almost 4 months s/p C3-7 laminectomy and fusion. She reports improvement of her arm pain. She continues to note numbness and difficulty with finger dexterity in her left hand. Her right hand numbness has significantly improved. She is status post carpal tunnel surgery by Dr. Shama Cassidy. Her incisions show no signs of infection. She has a normal gait and 5/5 strength of her wrist DFs/VFs and biceps/triceps bilaterally. Her sensation is intact from C5 to C8 bilaterally. She has swelling across the MCP joints of left greater than right hand. I reviewed AP and LAT x-rays of her cervical spine that were obtained in the office today. They show good position of her rods, screws and bone graft. I recommended occupational therapy for her left hand, Neurontin and a rheumatology visit.   She return in 2 month's time for repeat x-rays of her cervical spine

## 2018-08-23 ENCOUNTER — HOSPITAL ENCOUNTER (OUTPATIENT)
Dept: OTHER | Age: 73
Discharge: HOME | End: 2018-08-23
Attending: FAMILY MEDICINE | Admitting: FAMILY MEDICINE

## 2018-08-23 LAB
A/G RATIO: 1.4 (ref 1.1–2.2)
ALBUMIN SERPL-MCNC: 4.2 G/DL (ref 3.4–5)
ALP BLD-CCNC: 126 U/L (ref 40–129)
ALT SERPL-CCNC: 24 U/L (ref 10–40)
ANION GAP SERPL CALCULATED.3IONS-SCNC: 15 MMOL/L (ref 3–16)
AST SERPL-CCNC: 21 U/L (ref 15–37)
BASOPHILS ABSOLUTE: 0.1 K/UL (ref 0–0.2)
BASOPHILS RELATIVE PERCENT: 0.8 %
BILIRUB SERPL-MCNC: 0.3 MG/DL (ref 0–1)
BUN BLDV-MCNC: 12 MG/DL (ref 7–20)
CALCIUM SERPL-MCNC: 9.6 MG/DL (ref 8.3–10.6)
CHLORIDE BLD-SCNC: 102 MMOL/L (ref 99–110)
CHOLESTEROL, TOTAL: 238 MG/DL (ref 0–199)
CO2: 24 MMOL/L (ref 21–32)
CREAT SERPL-MCNC: <0.5 MG/DL (ref 0.6–1.2)
EOSINOPHILS ABSOLUTE: 0.3 K/UL (ref 0–0.6)
EOSINOPHILS RELATIVE PERCENT: 3.7 %
GFR AFRICAN AMERICAN: >60
GFR NON-AFRICAN AMERICAN: >60
GLOBULIN: 3.1 G/DL
GLUCOSE BLD-MCNC: 107 MG/DL (ref 70–99)
HCT VFR BLD CALC: 38.5 % (ref 36–48)
HDLC SERPL-MCNC: 75 MG/DL (ref 40–60)
HEMOGLOBIN: 12.5 G/DL (ref 12–16)
LDL CHOLESTEROL CALCULATED: 140 MG/DL
LYMPHOCYTES ABSOLUTE: 1.5 K/UL (ref 1–5.1)
LYMPHOCYTES RELATIVE PERCENT: 15.7 %
MCH RBC QN AUTO: 29.7 PG (ref 26–34)
MCHC RBC AUTO-ENTMCNC: 32.4 G/DL (ref 31–36)
MCV RBC AUTO: 91.7 FL (ref 80–100)
MONOCYTES ABSOLUTE: 0.9 K/UL (ref 0–1.3)
MONOCYTES RELATIVE PERCENT: 9.3 %
NEUTROPHILS ABSOLUTE: 6.5 K/UL (ref 1.7–7.7)
NEUTROPHILS RELATIVE PERCENT: 70.5 %
PDW BLD-RTO: 14.7 % (ref 12.4–15.4)
PLATELET # BLD: 342 K/UL (ref 135–450)
PMV BLD AUTO: 9.4 FL (ref 5–10.5)
POTASSIUM SERPL-SCNC: 4.4 MMOL/L (ref 3.5–5.1)
RBC # BLD: 4.19 M/UL (ref 4–5.2)
SODIUM BLD-SCNC: 141 MMOL/L (ref 136–145)
TOTAL PROTEIN: 7.3 G/DL (ref 6.4–8.2)
TRIGL SERPL-MCNC: 117 MG/DL (ref 0–150)
VLDLC SERPL CALC-MCNC: 23 MG/DL
WBC # BLD: 9.3 K/UL (ref 4–11)

## 2018-08-24 LAB
ESTIMATED AVERAGE GLUCOSE: 151.3 MG/DL
HBA1C MFR BLD: 6.9 %

## 2018-08-27 ENCOUNTER — TELEPHONE (OUTPATIENT)
Dept: INTERNAL MEDICINE CLINIC | Age: 73
End: 2018-08-27

## 2018-08-27 DIAGNOSIS — E78.5 HYPERLIPIDEMIA, UNSPECIFIED HYPERLIPIDEMIA TYPE: Chronic | ICD-10-CM

## 2018-08-27 RX ORDER — PRAVASTATIN SODIUM 10 MG
TABLET ORAL
Qty: 30 TABLET | Refills: 1 | Status: SHIPPED | OUTPATIENT
Start: 2018-08-27 | End: 2018-08-28 | Stop reason: SDUPTHER

## 2018-08-28 ENCOUNTER — TELEPHONE (OUTPATIENT)
Dept: FAMILY MEDICINE CLINIC | Age: 73
End: 2018-08-28

## 2018-08-28 DIAGNOSIS — E11.9 TYPE 2 DIABETES MELLITUS WITHOUT COMPLICATION, WITHOUT LONG-TERM CURRENT USE OF INSULIN (HCC): ICD-10-CM

## 2018-08-28 DIAGNOSIS — E78.5 HYPERLIPIDEMIA, UNSPECIFIED HYPERLIPIDEMIA TYPE: Chronic | ICD-10-CM

## 2018-08-28 RX ORDER — PRAVASTATIN SODIUM 10 MG
TABLET ORAL
Qty: 30 TABLET | Refills: 1 | Status: SHIPPED | OUTPATIENT
Start: 2018-08-28 | End: 2018-09-04

## 2018-08-28 RX ORDER — METFORMIN HYDROCHLORIDE 500 MG/1
TABLET, EXTENDED RELEASE ORAL
Qty: 120 TABLET | Refills: 0 | Status: SHIPPED | OUTPATIENT
Start: 2018-08-28 | End: 2018-08-28 | Stop reason: SDUPTHER

## 2018-08-28 RX ORDER — METFORMIN HYDROCHLORIDE 500 MG/1
TABLET, EXTENDED RELEASE ORAL
Qty: 28 TABLET | Refills: 0 | Status: SHIPPED | OUTPATIENT
Start: 2018-08-28 | End: 2018-09-04 | Stop reason: SINTOL

## 2018-08-28 NOTE — TELEPHONE ENCOUNTER
Pt is scheduled to see you on Sept 4th. Pt will run out of Metformin before then but only wants a partial fill of enough Metformin til the appt b/c she has been having problems with the drug and may need to change the dose. Please send a partial refill for Metformin to [facility].   Pt take Metform 4x day

## 2018-08-29 ENCOUNTER — HOSPITAL ENCOUNTER (OUTPATIENT)
Dept: OCCUPATIONAL THERAPY | Age: 73
Discharge: HOME | End: 2018-08-31
Admitting: ORTHOPAEDIC SURGERY

## 2018-09-01 ENCOUNTER — HOSPITAL ENCOUNTER (OUTPATIENT)
Dept: OTHER | Age: 73
Discharge: HOME | End: 2018-09-01
Attending: ORTHOPAEDIC SURGERY | Admitting: ORTHOPAEDIC SURGERY

## 2018-09-04 ENCOUNTER — OFFICE VISIT (OUTPATIENT)
Dept: FAMILY MEDICINE CLINIC | Age: 73
End: 2018-09-04

## 2018-09-04 VITALS
OXYGEN SATURATION: 96 % | WEIGHT: 236 LBS | DIASTOLIC BLOOD PRESSURE: 74 MMHG | BODY MASS INDEX: 36.96 KG/M2 | HEART RATE: 80 BPM | SYSTOLIC BLOOD PRESSURE: 130 MMHG

## 2018-09-04 DIAGNOSIS — I10 ESSENTIAL HYPERTENSION: Chronic | ICD-10-CM

## 2018-09-04 DIAGNOSIS — E11.9 TYPE 2 DIABETES MELLITUS WITHOUT COMPLICATION, WITHOUT LONG-TERM CURRENT USE OF INSULIN (HCC): Primary | ICD-10-CM

## 2018-09-04 DIAGNOSIS — E78.5 HYPERLIPIDEMIA, UNSPECIFIED HYPERLIPIDEMIA TYPE: Chronic | ICD-10-CM

## 2018-09-04 PROCEDURE — 99214 OFFICE O/P EST MOD 30 MIN: CPT | Performed by: FAMILY MEDICINE

## 2018-09-04 PROCEDURE — G0008 ADMIN INFLUENZA VIRUS VAC: HCPCS | Performed by: FAMILY MEDICINE

## 2018-09-04 PROCEDURE — 90662 IIV NO PRSV INCREASED AG IM: CPT | Performed by: FAMILY MEDICINE

## 2018-09-04 RX ORDER — PRAVASTATIN SODIUM 20 MG
20 TABLET ORAL EVERY EVENING
Qty: 90 TABLET | Refills: 1 | Status: SHIPPED | OUTPATIENT
Start: 2018-09-04 | End: 2019-01-07

## 2018-09-04 RX ORDER — GLIMEPIRIDE 2 MG/1
TABLET ORAL
Qty: 90 TABLET | Refills: 1 | Status: SHIPPED | OUTPATIENT
Start: 2018-09-04 | End: 2019-02-22 | Stop reason: SDUPTHER

## 2018-09-04 RX ORDER — PRAVASTATIN SODIUM 40 MG
20 TABLET ORAL EVERY EVENING
Qty: 90 TABLET | Refills: 1 | Status: SHIPPED | OUTPATIENT
Start: 2018-09-04 | End: 2018-09-04

## 2018-09-04 ASSESSMENT — ENCOUNTER SYMPTOMS
SHORTNESS OF BREATH: 0
DIARRHEA: 1
COUGH: 0
ABDOMINAL PAIN: 1

## 2018-09-04 NOTE — PROGRESS NOTES
Callie Sharma  : 1945  Encounter date: 2018    This is a 71 y.o. female who presents for a chronic condition visit. Chief Complaint   Patient presents with    Discuss Medications     Patient is having a difficult time with metformin. Patient states she is having diarrhea. Patient is taking Imodium and Gas x       Neck is better, but still with numbness, tingling in left hand. Mobility is improving and pain is improving. Strength is still not back. She is working on this. Still hard fine motor movement. She is seeing rheumatologist next month. Surgeon has told her that she has a lot of underlying arthritis. Can't do a lot of the things that she used to do. Having a hard time with metformin. If she has to go it is urgent. Started taking IB guard OTC. Helped a little. Then started taking imodium and doing it one daily. Bad gas pains in morning with stomach. Time release metformin was not approved. They did approve for a year and then stopped. Diabetes Mellitus Type 2: Current symptoms/problems include none and diarrhea. Medication compliance:  compliant all of the time  Diabetic diet compliance:  noncompliant: sometimes does well,  Weight trend: stable  Current exercise: has just started back with some water exercise; exercise has been limited post operatively  Barriers: pain    Home blood sugar records: sugars usually 135-150; lower at night than in morning  Any episodes of hypoglycemia? no  Eye exam current (within one year): yes   reports that she has never smoked. She has never used smokeless tobacco.   Daily Aspirin? Yes    Hypertension:  Home blood pressure monitoring: No.  She is not adherent to a low sodium diet. Patient denies chest pain, shortness of breath, peripheral edema and palpitations. Antihypertensive medication side effects: no medication side effects noted. Use of agents associated with hypertension: none.      Hyperlipidemia:  No new myalgias or GI upset on

## 2018-09-18 ENCOUNTER — OFFICE VISIT (OUTPATIENT)
Dept: RHEUMATOLOGY | Age: 73
End: 2018-09-18

## 2018-09-18 ENCOUNTER — HOSPITAL ENCOUNTER (OUTPATIENT)
Dept: OTHER | Age: 73
Discharge: HOME | End: 2018-09-18
Attending: INTERNAL MEDICINE | Admitting: INTERNAL MEDICINE

## 2018-09-18 VITALS
HEIGHT: 67 IN | DIASTOLIC BLOOD PRESSURE: 79 MMHG | HEART RATE: 71 BPM | SYSTOLIC BLOOD PRESSURE: 129 MMHG | BODY MASS INDEX: 37.51 KG/M2 | WEIGHT: 239 LBS

## 2018-09-18 DIAGNOSIS — M79.642 PAIN IN BOTH HANDS: ICD-10-CM

## 2018-09-18 DIAGNOSIS — R20.0 NUMBNESS IN BOTH HANDS: ICD-10-CM

## 2018-09-18 DIAGNOSIS — M79.642 PAIN IN BOTH HANDS: Primary | ICD-10-CM

## 2018-09-18 DIAGNOSIS — M25.552 PAIN OF BOTH HIP JOINTS: ICD-10-CM

## 2018-09-18 DIAGNOSIS — Z11.59 ENCOUNTER FOR SCREENING FOR OTHER VIRAL DISEASES: ICD-10-CM

## 2018-09-18 DIAGNOSIS — M25.551 PAIN OF BOTH HIP JOINTS: ICD-10-CM

## 2018-09-18 DIAGNOSIS — M79.641 PAIN IN BOTH HANDS: ICD-10-CM

## 2018-09-18 DIAGNOSIS — M79.641 PAIN IN BOTH HANDS: Primary | ICD-10-CM

## 2018-09-18 LAB
C-REACTIVE PROTEIN: 25.3 MG/L (ref 0–5.1)
RHEUMATOID FACTOR: <10 IU/ML
SEDIMENTATION RATE, ERYTHROCYTE: 43 MM/HR (ref 0–30)
TSH REFLEX FT4: 3.45 UIU/ML (ref 0.27–4.2)

## 2018-09-18 PROCEDURE — 99205 OFFICE O/P NEW HI 60 MIN: CPT | Performed by: INTERNAL MEDICINE

## 2018-09-18 RX ORDER — NAPROXEN 500 MG/1
500 TABLET ORAL 2 TIMES DAILY WITH MEALS
Qty: 60 TABLET | Refills: 3 | Status: SHIPPED | OUTPATIENT
Start: 2018-09-18 | End: 2019-01-07

## 2018-09-18 NOTE — PROGRESS NOTES
2018  Patient Name: Jorje Chávez  : 1945  Medical Record: [id number]    MEDICATIONS  Current Outpatient Prescriptions   Medication Sig Dispense Refill    naproxen (NAPROSYN) 500 MG tablet Take 1 tablet by mouth 2 times daily (with meals) 60 tablet 3    glimepiride (AMARYL) 2 MG tablet TAKE ONE TABLET BY MOUTH EVERY MORNING 90 tablet 1    pravastatin (PRAVACHOL) 20 MG tablet Take 1 tablet by mouth every evening 90 tablet 1    gabapentin (NEURONTIN) 300 MG capsule Take 1 capsule by mouth 4 times daily for 79 days. . 90 capsule 1    losartan (COZAAR) 50 MG tablet TAKE 1 TABLET DAILY 90 tablet 0    FLUoxetine (PROZAC) 20 MG capsule TAKE 1 CAPSULE DAILY 90 capsule 3    atenolol (TENORMIN) 50 MG tablet TAKE 1 TABLET DAILY 90 tablet 0    umeclidinium-vilanterol (ANORO ELLIPTA) 62.5-25 MCG/INH AEPB inhaler Inhale 1 puff into the lungs daily 3 each 3    aspirin 81 MG tablet Take 81 mg by mouth daily      glucose blood VI test strips (ONE TOUCH TEST STRIPS) strip 1 each by In Vitro route daily. Check FBS and 4 pm  each 3    ONE TOUCH LANCETS MISC Check FBS and 4 pm  each 3    Blood Glucose Monitoring Suppl (ONE TOUCH ULTRA 2) W/DEVICE KIT 1 kit by Does not apply route daily as needed. 1 kit 0    gabapentin (NEURONTIN) 300 MG capsule Take 1 capsule by mouth 3 times daily as needed (neck/arm pain) for up to 30 days. 40 capsule 0    albuterol sulfate  (90 Base) MCG/ACT inhaler Inhale 2 puffs into the lungs every 6 hours as needed for Wheezing 1 Inhaler 11     No current facility-administered medications for this visit.         ALLERGIES  Allergies   Allergen Reactions    Ace Inhibitors      cough    Actifed Cold-Allergy [Chlorpheniramine-Phenylephrine]     Cephalexin Other (See Comments)     Yeast infection if taken over a long period of time    Codeine Other (See Comments)     dizziness    Erythromycin Base      Patient denies on 2013    Lisinopril Other (See Comments) Cough     Lovastatin Other (See Comments)     Myalgias; mouth sores; ELEV LFT    Sudafed [Pseudoephedrine Hcl] Other (See Comments)     Shakes     Trinalin [Azatadine-Pseudoephedrine]          Comments  No specialty comments available. REFERRING PHYSICIAN: Jackson Duque MD     HISTORY OF PRESENT ILLNESS  Raymond Caro is a 71 y.o. female who is being seen for follow up evaluation of  hand pain. Patient was referred due to bilateral hand pain and tingling/numbness. Symptoms started a couple of years ago. She has a history of bilateral carpal tunnel surgery one year ago. Symptoms improved initially and then started again. She recently underwent cervical spine laminectomy with fusion. Symptoms improved in the right hand. She has difficulty with fine motor activities. She drops things from both hands. She has pain in the knuckles with intermittent swelling and stiffness in the morning lasting for one hour. Symptoms worsen with cold and improve with warm soaks. She takes extra strength Tylenol without significant improvement  She has started occupational therapy recently. She was placed on gabapentin 1200 mg daily for weeks ago and hasn't noticed any relief yet. She also has low back and hip pain and stiffness. Pain in the hips is located on the lateral side and gets worse with walking and standing. She denies psoriasis, inflammatory bowel disease, inflammatory back pain, dactylitis, enthesitis, tenosynovitis or uveitis. She has a history of lumbar spine surgery for spinal stenosis and bilateral knee replacements. HPI  ROS    REVIEW OF SYSTEMS: Positive for shortness of breath, fatigue  Constitutional: No unanticipated weight loss or fevers.    Integumentary: No rash, photosensitivity, malar rash, livedo reticularis, alopecia and Raynaud's symptoms, sclerodactyly, skin tightening  Eyes: negative for dry eyes, visual disturbance and persistent redness, discharge from eyes   ENT: - No tinnitus, Ankle  0  0  FULL   0  0  FULL    MTP1  0  0  FULL   0  0  FULL    MTP2  0  0  FULL   0  0  FULL    MTP3  0  0  FULL   0  0  FULL    MTP4  0  0  FULL   0  0  FULL    MTP5  0  0  FULL   0  0  FULL    IP1  0  0  FULL   0  0  FULL    IP2  0  0  FULL   0  0  FULL    IP3  0  0  FULL   0  0  FULL    IP4  0  0  FULL   0  0  FULL    IP5  0  0  FULL   0 0 FULL       Bilateral trochanteric bursitis  -Bilateral hip 10° internal rotation and 20° external rotation  -Puffiness and bilateral 2-3 MCP joints  Ambulates without assistance, normal gait  Neck: Full ROM, no tenderness, supple   Back- no tenderness. Eyes:  PERRL, extra ocular movements intact, conjunctiva normal   HEENT:  Atraumatic, normocephalic, external ears normal, oropharynx moist, no pharyngeal exudates. Respiratory:  No respiratory distress  GI:  Soft, nondistended, normal bowel sounds, nontender, no organomegaly, no mass, no rebound, no guarding   :  No costovertebral angle tenderness   Integument:  Well hydrated, no rash or telangiectasias  Lymphatic:  No lymphadenopathy noted   Neurologic:   Alert & oriented x 3, CN 2-12 normal, no focal deficits noted. Sensations Intact. Muscle strength 5/5 proximally and distally in upper and lower extremities.    Psychiatric:  Speech and behavior appropriate           LABS AND IMAGING  Outside data reviewed and in HPI    Lab Results   Component Value Date    WBC 9.3 08/23/2018    RBC 4.19 08/23/2018    HGB 12.5 08/23/2018    HCT 38.5 08/23/2018     08/23/2018    MCV 91.7 08/23/2018    MCH 29.7 08/23/2018    MCHC 32.4 08/23/2018    RDW 14.7 08/23/2018    SEGSPCT 67.1 09/17/2013    LYMPHOPCT 15.7 08/23/2018    MONOPCT 9.3 08/23/2018    BASOPCT 0.8 08/23/2018    MONOSABS 0.9 08/23/2018    LYMPHSABS 1.5 08/23/2018    EOSABS 0.3 08/23/2018    BASOSABS 0.1 08/23/2018    DIFFTYPE Auto/Scn 07/04/2012       Chemistry        Component Value Date/Time     08/23/2018 1150    K 4.4 08/23/2018 1150     08/23/2018 trochanteric bursitis contributing to the hip pain. Refuses corticosteroid injections. I will obtain bilateral hip x-rays and start naproxen 500 mg twice a day  -     XR HIP BILATERAL W AP PELVIS (2 VIEWS); Future    Numbness in both hands-etiology unclear. Will do bilateral upper extremity EMG, check vitamin B12 and TSH  -     Lisa Nelson MD (Inpatient and Outpatient EMG)  -     Vitamin B12; Future  -     TSH WITH REFLEX TO FT4; Future    Encounter for screening for other viral diseases   -     Hepatitis B Surface Antigen; Future     The patient was advised that NSAID-type medications have two very important potential side effects: gastrointestinal irritation including hemorrhage and renal injuries. She was asked to take the medication with food and to stop if she experiences any GI upset. I asked her to call for vomiting, abdominal pain or black/bloody stools. The patient expresses understanding of these issues and questions were answered. The patient indicates understanding of these issues and agrees with the plan. Return in about 6 weeks (around 10/30/2018). The risks and benefits of my recommendations, as well as other treatment options, benefits and side effects were discussed with the patient. All questions were answered. I reviewed patient's history, referral documents and electronic medical records  Copy of consult note is being routed electronically/faxed to referring physician         ######################################################################    I thank you for giving me the opportunity to participate in [facility]. If you have any questions or concerns please feel free to contact me. I look forward to following  Sloane Anne along with you. Electronically signed by: Brock Kowalski MD, 9/18/2018 9:52 AM    Documentation was done using voice recognition dragon software.   Every effort was made to ensure accuracy; however, inadvertent unintentional

## 2018-09-19 ENCOUNTER — PROCEDURE VISIT (OUTPATIENT)
Dept: NEUROLOGY | Age: 73
End: 2018-09-19

## 2018-09-19 DIAGNOSIS — M54.12 CERVICAL RADICULOPATHY: ICD-10-CM

## 2018-09-19 DIAGNOSIS — G56.03 BILATERAL CARPAL TUNNEL SYNDROME: Primary | ICD-10-CM

## 2018-09-19 LAB
HEPATITIS B CORE IGM ANTIBODY: NORMAL
HEPATITIS B SURFACE ANTIGEN INTERPRETATION: NORMAL
HEPATITIS C ANTIBODY INTERPRETATION: NORMAL
VITAMIN B-12: 153 PG/ML (ref 211–911)

## 2018-09-19 PROCEDURE — 95886 MUSC TEST DONE W/N TEST COMP: CPT | Performed by: PSYCHIATRY & NEUROLOGY

## 2018-09-19 PROCEDURE — 95911 NRV CNDJ TEST 9-10 STUDIES: CPT | Performed by: PSYCHIATRY & NEUROLOGY

## 2018-09-20 LAB — CCP IGG ANTIBODIES: 3 UNITS (ref 0–19)

## 2018-09-21 ENCOUNTER — TELEPHONE (OUTPATIENT)
Dept: INTERNAL MEDICINE CLINIC | Age: 73
End: 2018-09-21

## 2018-09-21 NOTE — TELEPHONE ENCOUNTER
Pt asking for a call to explain the test result that are in her mychart. She does not understand them. Please call pt.

## 2018-09-24 ENCOUNTER — TELEPHONE (OUTPATIENT)
Dept: ORTHOPEDIC SURGERY | Age: 73
End: 2018-09-24

## 2018-09-24 ENCOUNTER — TELEPHONE (OUTPATIENT)
Dept: RHEUMATOLOGY | Age: 73
End: 2018-09-24

## 2018-09-24 ENCOUNTER — TELEPHONE (OUTPATIENT)
Dept: FAMILY MEDICINE CLINIC | Age: 73
End: 2018-09-24

## 2018-09-24 DIAGNOSIS — Z98.1 S/P CERVICAL SPINAL FUSION: Primary | ICD-10-CM

## 2018-09-24 NOTE — TELEPHONE ENCOUNTER
----- Message from Luis Riggs MD sent at 9/22/2018  9:11 AM EDT -----  She has low b12.  I will forward to pcp to address

## 2018-09-25 ENCOUNTER — HOSPITAL ENCOUNTER (OUTPATIENT)
Dept: OCCUPATIONAL THERAPY | Age: 73
Setting detail: THERAPIES SERIES
Discharge: HOME | End: 2018-09-25
Payer: COMMERCIAL

## 2018-09-25 ENCOUNTER — TELEPHONE (OUTPATIENT)
Dept: ORTHOPEDIC SURGERY | Age: 73
End: 2018-09-25

## 2018-09-25 PROCEDURE — 97022 WHIRLPOOL THERAPY: CPT

## 2018-09-25 PROCEDURE — 97110 THERAPEUTIC EXERCISES: CPT

## 2018-09-25 NOTE — FLOWSHEET NOTE
Occupational Therapy Daily Treatment Note    Date:  2018    Patient Name:  Berna Gonzales    :  1945  MRN: [id number]  Restrictions/Precautions:    Medical/Treatment Diagnosis Information:   Diagnosis: S/P Cervical spinal fusion; Certical Spondylosis with Radiculopathy  Treatment Diagnosis: Decreased sensation, decreased fine motor control, decreased strength of LUE (primarily ) and RUE    Tracking Information:  Physician Information Referring Practitioner: [facility] Sent Date:  Signed Received:    Visit Count / Total Visits  2/10    Insurance Approved Visits  2 /10 Approved Dates:     Insurance Information OT Insurance Information: [employer]    Progress Note/G-codes   []  Yes  []  No Next Due:      Pain level: 0/10 no pain just discomfort as a result of poor sensation    Subjective: Pt reporting she is having MRI tomorrow morning on neck due to EMG results. MD wants to hold off on PT until that time.      Objective Measures:  18  Sensation  Overall Sensation Status: Impaired (L hand feels cold, L and R hands feel pins and needles throughout; L hand much worse)  LUE AROM (degrees)  LUE AROM : WNL  Left Hand AROM (degrees)  Left Hand AROM: WNL  RUE AROM (degrees)  RUE AROM : WNL  Right Hand AROM (degrees)  Right Hand AROM: WNL  Hand Dominance  Hand Dominance: Right  Left Hand Strength -  (lbs)  Handle Setting 2: 20,68,31  Left Hand Strength - Pinch (lbs)  Lateral: 9,9,9  Palmar 3 point: 9,8,7  Left 9-Hole Peg Test  Left 9-Hole Peg Test: Impaired  Right Hand Strength -  (lbs)  Handle Setting 2: 80,05,43  Right Hand Strength - Pinch (lbs)  Lateral: 15,15,16  Palmar 3 point: 12,14,14  Right 9-Hole Peg Test  Right 9-Hole Peg Test: Functional  Fine Motor Skills  Left 9-Hole Peg Test: Impaired  Left 9 Hole Peg Test Time (secs): 35  Right 9-Hole Peg Test: Functional  Right 9 Hole Peg Test Time (secs): 26                              Therapeutic Activities:     Initiated session

## 2018-09-26 ENCOUNTER — TELEPHONE (OUTPATIENT)
Dept: ORTHOPEDIC SURGERY | Age: 73
End: 2018-09-26

## 2018-09-26 ENCOUNTER — NURSE ONLY (OUTPATIENT)
Dept: FAMILY MEDICINE CLINIC | Age: 73
End: 2018-09-26
Payer: COMMERCIAL

## 2018-09-26 ENCOUNTER — HOSPITAL ENCOUNTER (OUTPATIENT)
Dept: MRI IMAGING | Age: 73
Discharge: HOME | End: 2018-09-26
Payer: COMMERCIAL

## 2018-09-26 DIAGNOSIS — Z98.1 S/P CERVICAL SPINAL FUSION: ICD-10-CM

## 2018-09-26 DIAGNOSIS — E53.8 VITAMIN B12 DEFICIENCY: Primary | ICD-10-CM

## 2018-09-26 DIAGNOSIS — M47.22 CERVICAL SPONDYLOSIS WITH RADICULOPATHY: Primary | ICD-10-CM

## 2018-09-26 LAB
BUN BLDV-MCNC: 15 MG/DL (ref 7–20)
CREAT SERPL-MCNC: 0.6 MG/DL (ref 0.6–1.2)
GFR AFRICAN AMERICAN: >60
GFR NON-AFRICAN AMERICAN: >60

## 2018-09-26 PROCEDURE — 36415 COLL VENOUS BLD VENIPUNCTURE: CPT

## 2018-09-26 PROCEDURE — 84520 ASSAY OF UREA NITROGEN: CPT

## 2018-09-26 PROCEDURE — 82565 ASSAY OF CREATININE: CPT

## 2018-09-26 PROCEDURE — 72156 MRI NECK SPINE W/O & W/DYE: CPT

## 2018-09-26 PROCEDURE — 96372 THER/PROPH/DIAG INJ SC/IM: CPT | Performed by: FAMILY MEDICINE

## 2018-09-26 PROCEDURE — 6360000004 HC RX CONTRAST MEDICATION: Performed by: ORTHOPAEDIC SURGERY

## 2018-09-26 PROCEDURE — 2580000003 HC RX 258: Performed by: ORTHOPAEDIC SURGERY

## 2018-09-26 PROCEDURE — A9579 GAD-BASE MR CONTRAST NOS,1ML: HCPCS | Performed by: ORTHOPAEDIC SURGERY

## 2018-09-26 RX ORDER — SODIUM CHLORIDE 0.9 % (FLUSH) 0.9 %
10 SYRINGE (ML) INJECTION ONCE
Status: COMPLETED | OUTPATIENT
Start: 2018-09-26 | End: 2018-09-26

## 2018-09-26 RX ORDER — CYANOCOBALAMIN 1000 UG/ML
1000 INJECTION INTRAMUSCULAR; SUBCUTANEOUS ONCE
Status: COMPLETED | OUTPATIENT
Start: 2018-09-26 | End: 2018-09-26

## 2018-09-26 RX ADMIN — Medication 10 ML: at 08:15

## 2018-09-26 RX ADMIN — CYANOCOBALAMIN 1000 MCG: 1000 INJECTION INTRAMUSCULAR; SUBCUTANEOUS at 11:54

## 2018-09-26 RX ADMIN — GADOTERIDOL 10 MMOL: 279.3 INJECTION, SOLUTION INTRAVENOUS at 08:15

## 2018-09-28 ENCOUNTER — HOSPITAL ENCOUNTER (OUTPATIENT)
Dept: OCCUPATIONAL THERAPY | Age: 73
Setting detail: THERAPIES SERIES
Discharge: HOME | End: 2018-09-28
Payer: COMMERCIAL

## 2018-09-28 PROCEDURE — 97110 THERAPEUTIC EXERCISES: CPT

## 2018-09-28 PROCEDURE — 97022 WHIRLPOOL THERAPY: CPT

## 2018-09-28 NOTE — FLOWSHEET NOTE
Occupational Therapy Daily Treatment Note    Date:  2018    Patient Name:  Hua Gusman    :  1945  MRN: [id number]  Restrictions/Precautions:    Medical/Treatment Diagnosis Information:   Diagnosis: S/P Cervical spinal fusion; Certical Spondylosis with Radiculopathy  Treatment Diagnosis: Decreased sensation, decreased fine motor control, decreased strength of LUE (primarily ) and RUE    Tracking Information:  Physician Information Referring Practitioner: [facility] Sent Date:  Signed Received:    Visit Count / Total Visits  2/10    Insurance Approved Visits  2 /10 Approved Dates:     Insurance Information OT Insurance Information: [employer]    Progress Note/G-codes   []  Yes  []  No Next Due:      Pain level: 0/10 no pain just discomfort as a result of poor sensation    Subjective: Pt reports that her MRI was good, but they ordered PT. Objective Measures:  18  Sensation  Overall Sensation Status: Impaired (L hand feels cold, L and R hands feel pins and needles throughout; L hand much worse)  LUE AROM (degrees)  LUE AROM : WNL  Left Hand AROM (degrees)  Left Hand AROM: WNL  RUE AROM (degrees)  RUE AROM : WNL  Right Hand AROM (degrees)  Right Hand AROM: WNL  Hand Dominance  Hand Dominance: Right  Left Hand Strength -  (lbs)  Handle Setting 2: 39,19,65  Left Hand Strength - Pinch (lbs)  Lateral: 9,9,9  Palmar 3 point: 9,8,7  Left 9-Hole Peg Test  Left 9-Hole Peg Test: Impaired  Right Hand Strength -  (lbs)  Handle Setting 2: 42,01,56  Right Hand Strength - Pinch (lbs)  Lateral: 15,15,16  Palmar 3 point: 12,14,14  Right 9-Hole Peg Test  Right 9-Hole Peg Test: Functional  Fine Motor Skills  Left 9-Hole Peg Test: Impaired  Left 9 Hole Peg Test Time (secs): 35  Right 9-Hole Peg Test: Functional  Right 9 Hole Peg Test Time (secs): 26                              Therapeutic Activities:     Initiated session with fluidotherapy for sensory benefits followed by 4min UBE.

## 2018-10-01 ENCOUNTER — APPOINTMENT (OUTPATIENT)
Dept: PHYSICAL THERAPY | Age: 73
End: 2018-10-01
Payer: COMMERCIAL

## 2018-10-01 ENCOUNTER — HOSPITAL ENCOUNTER (OUTPATIENT)
Dept: OCCUPATIONAL THERAPY | Age: 73
Setting detail: THERAPIES SERIES
Discharge: HOME | End: 2018-10-01
Payer: COMMERCIAL

## 2018-10-01 ENCOUNTER — HOSPITAL ENCOUNTER (OUTPATIENT)
Dept: PHYSICAL THERAPY | Age: 73
Setting detail: THERAPIES SERIES
Discharge: HOME | End: 2018-10-01
Payer: COMMERCIAL

## 2018-10-01 PROCEDURE — 97530 THERAPEUTIC ACTIVITIES: CPT

## 2018-10-01 PROCEDURE — G8985 CARRY GOAL STATUS: HCPCS

## 2018-10-01 PROCEDURE — G8984 CARRY CURRENT STATUS: HCPCS

## 2018-10-01 PROCEDURE — 97140 MANUAL THERAPY 1/> REGIONS: CPT

## 2018-10-01 PROCEDURE — 97161 PT EVAL LOW COMPLEX 20 MIN: CPT

## 2018-10-01 PROCEDURE — 97022 WHIRLPOOL THERAPY: CPT

## 2018-10-01 ASSESSMENT — PAIN SCALES - GENERAL: PAINLEVEL_OUTOF10: 3

## 2018-10-01 ASSESSMENT — PAIN DESCRIPTION - ORIENTATION: ORIENTATION: MID

## 2018-10-01 ASSESSMENT — PAIN DESCRIPTION - LOCATION: LOCATION: NECK

## 2018-10-01 ASSESSMENT — PAIN DESCRIPTION - FREQUENCY: FREQUENCY: CONTINUOUS

## 2018-10-01 NOTE — PROGRESS NOTES
Motor?: WNL  Additional Measures  Flexibility: With PROM of shoulders: tightness in R IR, tightness in L IR and ER  Other: Manual therapy: SOR, STM to cervical paraspinals and UTs  Sensation  Overall Sensation Status: Impaired  Additional Comments: Pt reports numbness in B/L hands       Assessment   Conditions Requiring Skilled Therapeutic Intervention  Body structures, Functions, Activity limitations: Decreased functional mobility ; Decreased ADL status; Decreased ROM; Decreased strength  Assessment: Pt is a 71year old female who presents to PT s/p cervical fusion in April 2018. Pt is mostly pain free but is complaining of poor endurance in the cervical muscles and weakness in the shoulders. She is also experiencing numbness in B/L hands, however this is already being addressed by OT. Pt would benefit from skilled therapy services to address the above listed deficits and improve functional mobility. Treatment Diagnosis: Poor endurance and decreased flexibility of cervical musculature, tightness in shoulder IR B/L, impaired posture, weakness in B/L UEs  Prognosis: Good  Decision Making: Low Complexity  REQUIRES PT FOLLOW UP: Yes  Activity Tolerance  Activity Tolerance: Patient Tolerated treatment well         Plan   Plan  Times per week: 2x  Plan weeks: 5 weeks   Current Treatment Recommendations: Strengthening, ROM, Neuromuscular Re-education, Home Exercise Program, Manual Therapy - Soft Tissue Mobilization, Pain Management, Modalities    G-Code  PT G-Codes  Functional Assessment Tool Used: PT Assessment   Functional Limitation: Carrying, moving and handling objects  Carrying, Moving and Handling Objects Current Status ():  At least 20 percent but less than 40 percent impaired, limited or restricted  Carrying, Moving and Handling Objects Goal Status (): 0 percent impaired, limited or restricted    Goals  Long term goals  Time Frame for Long term goals : 5 weeks  Long term goal 1: Pt to increase gross shoulder and UE strength to 4+/5 for improved ability to complete housework and recreational tasks. Long term goal 2: Pt to demo improved cervical posture with reduced myofascial tightness for reduced pain levels and stiffness inhibiting activities. Long term goal 3: Pt to be independent with HEP for improved strength and flexibility for improved functional mobility. Patient Goals   Patient goals :  To strengthen neck and shoulders       Therapy Time   Individual Concurrent Group Co-treatment   Time In 1135         Time Out 1215         Minutes 40         Timed Code Treatment Minutes: [address], [address] [facility] DPT [id number]

## 2018-10-02 ENCOUNTER — TELEPHONE (OUTPATIENT)
Dept: ORTHOPEDIC SURGERY | Age: 73
End: 2018-10-02

## 2018-10-03 ENCOUNTER — NURSE ONLY (OUTPATIENT)
Dept: FAMILY MEDICINE CLINIC | Age: 73
End: 2018-10-03
Payer: COMMERCIAL

## 2018-10-03 DIAGNOSIS — D50.9 IRON DEFICIENCY ANEMIA, UNSPECIFIED IRON DEFICIENCY ANEMIA TYPE: Primary | Chronic | ICD-10-CM

## 2018-10-03 PROCEDURE — 96372 THER/PROPH/DIAG INJ SC/IM: CPT | Performed by: FAMILY MEDICINE

## 2018-10-03 RX ORDER — CYANOCOBALAMIN 1000 UG/ML
1000 INJECTION INTRAMUSCULAR; SUBCUTANEOUS ONCE
Status: COMPLETED | OUTPATIENT
Start: 2018-10-03 | End: 2018-10-03

## 2018-10-03 RX ORDER — GABAPENTIN 300 MG/1
300 CAPSULE ORAL 4 TIMES DAILY
Qty: 90 CAPSULE | Refills: 1 | Status: SHIPPED | OUTPATIENT
Start: 2018-10-03 | End: 2018-12-01 | Stop reason: SDUPTHER

## 2018-10-03 RX ADMIN — CYANOCOBALAMIN 1000 MCG: 1000 INJECTION INTRAMUSCULAR; SUBCUTANEOUS at 13:59

## 2018-10-03 NOTE — TELEPHONE ENCOUNTER
Left her a message that her script was at the pharmacy and check in with the pharmacy before she picked it up make sure it was ready.

## 2018-10-04 ENCOUNTER — HOSPITAL ENCOUNTER (OUTPATIENT)
Dept: OCCUPATIONAL THERAPY | Age: 73
Setting detail: THERAPIES SERIES
Discharge: HOME | End: 2018-10-04
Payer: COMMERCIAL

## 2018-10-04 ENCOUNTER — HOSPITAL ENCOUNTER (OUTPATIENT)
Dept: PHYSICAL THERAPY | Age: 73
Setting detail: THERAPIES SERIES
Discharge: HOME | End: 2018-10-04
Payer: COMMERCIAL

## 2018-10-04 PROCEDURE — 97110 THERAPEUTIC EXERCISES: CPT

## 2018-10-04 PROCEDURE — 97022 WHIRLPOOL THERAPY: CPT

## 2018-10-04 PROCEDURE — 97140 MANUAL THERAPY 1/> REGIONS: CPT

## 2018-10-09 ENCOUNTER — HOSPITAL ENCOUNTER (OUTPATIENT)
Dept: OCCUPATIONAL THERAPY | Age: 73
Setting detail: THERAPIES SERIES
Discharge: HOME | End: 2018-10-09
Payer: COMMERCIAL

## 2018-10-09 ENCOUNTER — HOSPITAL ENCOUNTER (OUTPATIENT)
Dept: PHYSICAL THERAPY | Age: 73
Setting detail: THERAPIES SERIES
Discharge: HOME | End: 2018-10-09
Payer: COMMERCIAL

## 2018-10-09 PROCEDURE — 97110 THERAPEUTIC EXERCISES: CPT

## 2018-10-09 PROCEDURE — 97140 MANUAL THERAPY 1/> REGIONS: CPT

## 2018-10-09 PROCEDURE — 97022 WHIRLPOOL THERAPY: CPT

## 2018-10-09 PROCEDURE — 97530 THERAPEUTIC ACTIVITIES: CPT

## 2018-10-09 NOTE — FLOWSHEET NOTE
flex bar in wrist flexion, extension, deviation, supination, and pronation all 1x12 reps. Pt then completed reaching into cabinets with 2# weight x10 reps each side.       Therapeutic Exercise:   Exercise/Equipment  Resistance/Repetitions   Other comments                    Home Exercise Program:    · Nerve glides  · Contrast baths  · Flipping and stacking coins    Manual Treatments:      Modalities: 10 min fluido    Timed Code Treatment Minutes:  20    Total Treatment Minutes: 30     Treatment/Activity Tolerance:     [x]  Patient tolerated treatment well []  Patient limited by fatigue    []  Patient limited by pain []  Patient limited by other medical complications   []  Other:     Prognosis: [x]  Good []  Fair  []  Poor    Patient Requires Follow-up:  [x]  Yes  []  No    Plan: [x]  Continue per plan of care []  Alter current plan (see comments)   []  Plan of care initiated []  Hold pending MD visit []  Discharge  Plan for Next Session:      Electronically signed by:

## 2018-10-10 ENCOUNTER — NURSE ONLY (OUTPATIENT)
Dept: FAMILY MEDICINE CLINIC | Age: 73
End: 2018-10-10
Payer: COMMERCIAL

## 2018-10-10 DIAGNOSIS — D50.9 IRON DEFICIENCY ANEMIA, UNSPECIFIED IRON DEFICIENCY ANEMIA TYPE: Primary | Chronic | ICD-10-CM

## 2018-10-10 PROCEDURE — 96372 THER/PROPH/DIAG INJ SC/IM: CPT | Performed by: FAMILY MEDICINE

## 2018-10-10 RX ORDER — CYANOCOBALAMIN 1000 UG/ML
1000 INJECTION INTRAMUSCULAR; SUBCUTANEOUS ONCE
Status: COMPLETED | OUTPATIENT
Start: 2018-10-10 | End: 2018-10-10

## 2018-10-10 RX ADMIN — CYANOCOBALAMIN 1000 MCG: 1000 INJECTION INTRAMUSCULAR; SUBCUTANEOUS at 10:40

## 2018-10-11 ENCOUNTER — APPOINTMENT (OUTPATIENT)
Dept: PHYSICAL THERAPY | Age: 73
End: 2018-10-11
Payer: COMMERCIAL

## 2018-10-11 ENCOUNTER — APPOINTMENT (OUTPATIENT)
Dept: OCCUPATIONAL THERAPY | Age: 73
End: 2018-10-11
Payer: COMMERCIAL

## 2018-10-12 ENCOUNTER — HOSPITAL ENCOUNTER (OUTPATIENT)
Dept: PHYSICAL THERAPY | Age: 73
Setting detail: THERAPIES SERIES
Discharge: HOME | End: 2018-10-12
Payer: COMMERCIAL

## 2018-10-12 ENCOUNTER — HOSPITAL ENCOUNTER (OUTPATIENT)
Dept: OCCUPATIONAL THERAPY | Age: 73
Setting detail: THERAPIES SERIES
Discharge: HOME | End: 2018-10-12
Payer: COMMERCIAL

## 2018-10-12 PROCEDURE — 97530 THERAPEUTIC ACTIVITIES: CPT

## 2018-10-12 PROCEDURE — 97110 THERAPEUTIC EXERCISES: CPT

## 2018-10-12 PROCEDURE — 97022 WHIRLPOOL THERAPY: CPT

## 2018-10-12 PROCEDURE — 97140 MANUAL THERAPY 1/> REGIONS: CPT

## 2018-10-12 NOTE — FLOWSHEET NOTE
Occupational Therapy Daily Treatment Note    Date:  10/12/2018    Patient Name:  Didier Morin    :  1945  MRN: [id number]  Restrictions/Precautions:    Medical/Treatment Diagnosis Information:   Diagnosis: S/P Cervical spinal fusion; Certical Spondylosis with Radiculopathy  Treatment Diagnosis: Decreased sensation, decreased fine motor control, decreased strength of LUE (primarily ) and RUE    Tracking Information:  Physician Information Referring Practitioner: Pete [facility] Care Sent Date: 18 Signed Received: 18   Visit Count / Total Visits  8/10    Insurance Approved Visits  8/10 Approved Dates:     Insurance Information OT Insurance Information: [employer]    Progress Note/G-codes   []  Yes  []  No Next Due:      Pain level: 0/10 stiffness in neck    Subjective: Pt reports her daughter did her nails yesterday and said her left hand felt cold.     Objective Measures:  18  Sensation  Overall Sensation Status: Impaired (L hand feels cold, L and R hands feel pins and needles throughout; L hand much worse)  LUE AROM (degrees)  LUE AROM : WNL  Left Hand AROM (degrees)  Left Hand AROM: WNL  RUE AROM (degrees)  RUE AROM : WNL  Right Hand AROM (degrees)  Right Hand AROM: WNL  Hand Dominance  Hand Dominance: Right  Left Hand Strength -  (lbs)  Handle Setting 2: 45,67,75  Left Hand Strength - Pinch (lbs)  Lateral: 9,9,9  Palmar 3 point: 9,8,7  Left 9-Hole Peg Test  Left 9-Hole Peg Test: Impaired  Right Hand Strength -  (lbs)  Handle Setting 2: 10,82,75  Right Hand Strength - Pinch (lbs)  Lateral: 15,15,16  Palmar 3 point: 12,14,14  Right 9-Hole Peg Test  Right 9-Hole Peg Test: Functional  Fine Motor Skills  Left 9-Hole Peg Test: Impaired  Left 9 Hole Peg Test Time (secs): 35  Right 9-Hole Peg Test: Functional  Right 9 Hole Peg Test Time (secs): 26                              Therapeutic Activities:Initiated session with 10 minutes fluidotherapy for sensory benefits followed by pushing golf tees into green putty and balancing marbles on top x10 each side. Ended with grasping and lifting 3# dynaball for gross  strength.       Therapeutic Exercise:   Exercise/Equipment  Resistance/Repetitions   Other comments                    Home Exercise Program:    · Nerve glides  · Contrast baths  · Flipping and stacking coins    Manual Treatments:      Modalities: 10 min fluido    Timed Code Treatment Minutes:  20    Total Treatment Minutes: 30     Treatment/Activity Tolerance:     [x]  Patient tolerated treatment well []  Patient limited by fatigue    []  Patient limited by pain []  Patient limited by other medical complications   []  Other:     Prognosis: [x]  Good []  Fair  []  Poor    Patient Requires Follow-up:  [x]  Yes  []  No    Plan: [x]  Continue per plan of care []  Alter current plan (see comments)   []  Plan of care initiated []  Hold pending MD visit []  Discharge  Plan for Next Session:      Electronically signed by:

## 2018-10-12 NOTE — FLOWSHEET NOTE
include: Mid row, LPD, chin tucks.     Manual Treatments:     10/9, 10/12: SOR, STM to cervical paraspinals, UTs, lower C/S musculature x 15 min   10/4: SOR, STM to cervical paraspinals and UTs x 10 min   10/1: SOR, STM to cervical paraspinals and UTs, PROM of shoulders     Modalities:    10/9: MHP to neck while pt working with OT x 15 min   10/4, 10/12: MHP to neck in supine with large bolster x 15 min    Timed Code Treatment Minutes:   30    Total Treatment Minutes:  45    Treatment/Activity Tolerance:  [x] Patient tolerated treatment well [] Patient limited by fatigue  [] Patient limited by pain  [] Patient limited by other medical complications  [] Other:     Prognosis: [x] Good [] Fair  [] Poor    Patient Requires Follow-up: [x] Yes  [] No    Plan:   [x] Continue per plan of care [] Alter current plan (see comments)  [] Plan of care initiated [] Hold pending MD visit [] Discharge  Plan for Next Session:  See above    Electronically signed by:  Annelise Plaza, PT , DPT

## 2018-10-16 ENCOUNTER — OFFICE VISIT (OUTPATIENT)
Dept: ORTHOPEDIC SURGERY | Age: 73
End: 2018-10-16
Payer: COMMERCIAL

## 2018-10-16 ENCOUNTER — HOSPITAL ENCOUNTER (OUTPATIENT)
Dept: OCCUPATIONAL THERAPY | Age: 73
Setting detail: THERAPIES SERIES
Discharge: HOME | End: 2018-10-16
Payer: COMMERCIAL

## 2018-10-16 VITALS
SYSTOLIC BLOOD PRESSURE: 136 MMHG | BODY MASS INDEX: 36.96 KG/M2 | HEIGHT: 66 IN | HEART RATE: 68 BPM | DIASTOLIC BLOOD PRESSURE: 80 MMHG | WEIGHT: 230 LBS

## 2018-10-16 DIAGNOSIS — Z98.1 S/P CERVICAL SPINAL FUSION: Primary | ICD-10-CM

## 2018-10-16 PROCEDURE — 97022 WHIRLPOOL THERAPY: CPT

## 2018-10-16 PROCEDURE — 97530 THERAPEUTIC ACTIVITIES: CPT

## 2018-10-16 PROCEDURE — 99213 OFFICE O/P EST LOW 20 MIN: CPT | Performed by: ORTHOPAEDIC SURGERY

## 2018-10-17 ENCOUNTER — HOSPITAL ENCOUNTER (OUTPATIENT)
Dept: PHYSICAL THERAPY | Age: 73
Setting detail: THERAPIES SERIES
Discharge: HOME | End: 2018-10-17
Payer: COMMERCIAL

## 2018-10-17 ENCOUNTER — NURSE ONLY (OUTPATIENT)
Dept: FAMILY MEDICINE CLINIC | Age: 73
End: 2018-10-17
Payer: COMMERCIAL

## 2018-10-17 DIAGNOSIS — E53.8 VITAMIN B12 DEFICIENCY: Primary | ICD-10-CM

## 2018-10-17 PROCEDURE — 96372 THER/PROPH/DIAG INJ SC/IM: CPT | Performed by: FAMILY MEDICINE

## 2018-10-17 PROCEDURE — 97110 THERAPEUTIC EXERCISES: CPT

## 2018-10-17 PROCEDURE — 97140 MANUAL THERAPY 1/> REGIONS: CPT

## 2018-10-17 RX ORDER — CYANOCOBALAMIN 1000 UG/ML
1000 INJECTION INTRAMUSCULAR; SUBCUTANEOUS ONCE
Status: COMPLETED | OUTPATIENT
Start: 2018-10-17 | End: 2018-10-17

## 2018-10-17 RX ADMIN — CYANOCOBALAMIN 1000 MCG: 1000 INJECTION INTRAMUSCULAR; SUBCUTANEOUS at 09:53

## 2018-10-17 NOTE — FLOWSHEET NOTE
participation in PT. Home Exercise Program:    10/1/18 Patient was educated on home exercise program including distrubution of handout describing exercises, sets, repetitions, frequency and intensity. Exercises/activities include: Mid row, LPD, chin tucks.     Manual Treatments:     10/9, 10/12: SOR, STM to cervical paraspinals, UTs, lower C/S musculature x 15 min   10/4, 10/17: SOR, STM to cervical paraspinals and UTs x 10 min   10/1: SOR, STM to cervical paraspinals and UTs, PROM of shoulders     Modalities:    10/9: MHP to neck while pt working with OT x 15 min    10/4, 10/12, 10/17: MHP to neck in supine with large bolster x 15 min    Timed Code Treatment Minutes:   27    Total Treatment Minutes:  42    Treatment/Activity Tolerance:  [x] Patient tolerated treatment well [] Patient limited by fatigue  [] Patient limited by pain  [] Patient limited by other medical complications  [] Other:     Prognosis: [x] Good [] Fair  [] Poor    Patient Requires Follow-up: [x] Yes  [] No    Plan:   [x] Continue per plan of care [] Alter current plan (see comments)  [] Plan of care initiated [] Hold pending MD visit [] Discharge  Plan for Next Session:  See above    Electronically signed by:  Darnell De Guzman, PT , DPT

## 2018-10-18 ENCOUNTER — HOSPITAL ENCOUNTER (OUTPATIENT)
Dept: PHYSICAL THERAPY | Age: 73
Setting detail: THERAPIES SERIES
Discharge: HOME | End: 2018-10-18
Payer: COMMERCIAL

## 2018-10-18 ENCOUNTER — HOSPITAL ENCOUNTER (OUTPATIENT)
Dept: OCCUPATIONAL THERAPY | Age: 73
Setting detail: THERAPIES SERIES
Discharge: HOME | End: 2018-10-18
Payer: COMMERCIAL

## 2018-10-18 PROCEDURE — 97110 THERAPEUTIC EXERCISES: CPT

## 2018-10-18 PROCEDURE — G8990 OTHER PT/OT CURRENT STATUS: HCPCS

## 2018-10-18 PROCEDURE — 97530 THERAPEUTIC ACTIVITIES: CPT

## 2018-10-18 PROCEDURE — 97140 MANUAL THERAPY 1/> REGIONS: CPT

## 2018-10-18 PROCEDURE — G8991 OTHER PT/OT GOAL STATUS: HCPCS

## 2018-10-18 PROCEDURE — G8992 OTHER PT/OT  D/C STATUS: HCPCS

## 2018-10-18 ASSESSMENT — 9 HOLE PEG TEST
TESTTIME_SECONDS: 26
TEST_RESULT: FUNCTIONAL
TESTTIME_SECONDS: 29
TEST_RESULT: FUNCTIONAL

## 2018-10-18 NOTE — DISCHARGE SUMMARY
Radiculopathy  Subjective  Subjective: Pt reports she feels she is 60% better since starting therapy. She feels her sensation is her biggest limiting factor, which will take time to determine return. Pain Assessment  Patient Currently in Pain: No (not pain its the sensation)        Objective:  Social/Functional History  Lives With: Spouse  Type of Home: House  Home Layout: Multi-level  ADL Assistance: Independent (buttoning, necklaces clasping, lifting heavy items with L hand)  Homemaking Assistance: Independent  Homemaking Responsibilities: Yes  Active : Yes  Occupation: Retired (Retail, Day care)  Leisure & Hobbies: Goes to [employer], traveling, cruises, going to [email or web address], go out to eat with friends. IADL History  Homemaking Responsibilities: Yes  Active : Yes  Occupation: Retired (Retail, Day care)  Leisure & Hobbies: Goes to [employer], traveling, cruises, going to [email or web address], go out to eat with friends.   Prior Function  Lives With: Spouse  ADL Assistance: Independent (buttoning, necklaces clasping, lifting heavy items with L hand)  Homemaking Assistance: Independent     ADL  Additional Comments: independent thorughout  Tone RUE  RUE Tone: Normotonic  Tone LUE  LUE Tone: Normotonic  Coordination  Movements Are Fluid And Coordinated: No  Coordination and Movement description: Fine motor impairments, Left UE                                      Hand Dominance  Hand Dominance: Right  Left Hand Strength -  (lbs)  Handle Setting 2: 46, 43, 42  Left Hand Strength - Pinch (lbs)  Lateral: 10, 10, 10  Palmar 3 point: 10, 10, 10  Left 9-Hole Peg Test  Left 9-Hole Peg Test: Functional  Right Hand Strength -  (lbs)  Handle Setting 2: 59, 55, 54  Right Hand Strength - Pinch (lbs)  Lateral: 16.5, 16, 15  Palmar 3 point: 12, 15, 14  Right 9-Hole Peg Test  Right 9-Hole Peg Test: Functional  Fine Motor Skills  Left 9-Hole Peg Test: Functional  Left 9 Hole Peg Test Time

## 2018-10-19 PROCEDURE — G8990 OTHER PT/OT CURRENT STATUS: HCPCS

## 2018-10-19 PROCEDURE — G8991 OTHER PT/OT GOAL STATUS: HCPCS

## 2018-10-19 PROCEDURE — G8992 OTHER PT/OT  D/C STATUS: HCPCS

## 2018-10-19 ASSESSMENT — 9 HOLE PEG TEST
TEST_RESULT: FUNCTIONAL
TEST_RESULT: FUNCTIONAL
TESTTIME_SECONDS: 26

## 2018-10-22 ENCOUNTER — OFFICE VISIT (OUTPATIENT)
Dept: RHEUMATOLOGY | Age: 73
End: 2018-10-22
Payer: COMMERCIAL

## 2018-10-22 ENCOUNTER — HOSPITAL ENCOUNTER (OUTPATIENT)
Dept: PHYSICAL THERAPY | Age: 73
Setting detail: THERAPIES SERIES
Discharge: HOME | End: 2018-10-22
Payer: COMMERCIAL

## 2018-10-22 VITALS
WEIGHT: 240.6 LBS | HEART RATE: 92 BPM | SYSTOLIC BLOOD PRESSURE: 188 MMHG | HEIGHT: 67 IN | TEMPERATURE: 97.9 F | BODY MASS INDEX: 37.76 KG/M2 | DIASTOLIC BLOOD PRESSURE: 113 MMHG

## 2018-10-22 DIAGNOSIS — R79.82 ELEVATED C-REACTIVE PROTEIN (CRP): ICD-10-CM

## 2018-10-22 DIAGNOSIS — R70.0 ELEVATED SED RATE: ICD-10-CM

## 2018-10-22 DIAGNOSIS — M15.9 PRIMARY OSTEOARTHRITIS INVOLVING MULTIPLE JOINTS: Primary | ICD-10-CM

## 2018-10-22 DIAGNOSIS — G56.03 BILATERAL CARPAL TUNNEL SYNDROME: ICD-10-CM

## 2018-10-22 DIAGNOSIS — G56.23 LESIONS OF BOTH ULNAR NERVES: ICD-10-CM

## 2018-10-22 LAB
C-REACTIVE PROTEIN: 21.8 MG/L (ref 0–5.1)
SEDIMENTATION RATE, ERYTHROCYTE: 45 MM/HR (ref 0–30)

## 2018-10-22 PROCEDURE — 99214 OFFICE O/P EST MOD 30 MIN: CPT | Performed by: INTERNAL MEDICINE

## 2018-10-22 PROCEDURE — 97140 MANUAL THERAPY 1/> REGIONS: CPT

## 2018-10-22 PROCEDURE — 97110 THERAPEUTIC EXERCISES: CPT

## 2018-10-22 RX ORDER — PREDNISONE 1 MG/1
5 TABLET ORAL DAILY
Qty: 42 TABLET | Refills: 0 | Status: SHIPPED | OUTPATIENT
Start: 2018-10-22 | End: 2018-10-22 | Stop reason: SDUPTHER

## 2018-10-22 RX ORDER — PREDNISONE 1 MG/1
5 TABLET ORAL 2 TIMES DAILY
Qty: 42 TABLET | Refills: 0 | Status: SHIPPED | OUTPATIENT
Start: 2018-10-22 | End: 2018-11-12

## 2018-10-22 NOTE — PROGRESS NOTES
negative for dry eyes, visual disturbance and persistent redness, discharge from eyes   ENT: - No tinnitus, loss of hearing, vertigo, or recurrent ear infections.  - No history of nasal/oral ulcers. - No history of sicca symptoms. Cardiovascular: No history of pericarditis, chest pain or murmur or palpitations  Respiratory: No cough or history of interstitial lung disease. No history of pleurisy. No history of tuberculosis or atypical infections. Gastrointestinal: No history of heart burn, dysphagia or esophageal dysmotility. No change in bowel habits or any inflammatory bowel disease. Genitourinary: No history renal disease, miscarriages. Hematologic/Lymphatic: No abnormal bruising or bleeding, blood clots or swollen lymph nodes. Neurological: No history seizure or focal weakness. No history of neuropathies, hyperesthesias, facial droop, diplopia  Psychiatric: No history of bipolar disease, depression  Endocrine: Denies any thyroid / parathyroid disease and osteoporosis  Allergic/Immunologic: No nasal congestion or hives. I have reviewed patients Past medical History, Social History and Family History as mentioned in her chart and this remains unchanged from previous.     Past Medical History:   Diagnosis Date    Anxiety     BCC (basal cell carcinoma of skin)     NBCC     COPD (chronic obstructive pulmonary disease) (HCC)     Hyperlipidemia     Hypertension     Obesity     Osteoarthritis     Type 2 diabetes mellitus ([facility] [address] 2/27/2015    Wears partial dentures      Past Surgical History:   Procedure Laterality Date    CARDIAC CATHETERIZATION      CARPAL TUNNEL RELEASE Right 10/19/2017    CARPAL TUNNEL RELEASE Left 11/02/2017     Left  Carpal Tunnel Release & Left Ulnar Nerve decompression at the Cubital Tunnel     CERVICAL LAMINECTOMY  04/18/2018    POSTERIOR CERVICAL LAMINECTOMY AND POSTERIOR FUSION C3-C7 WITH MEDTRONIC,EVOKES AND O-ARM    COLONOSCOPY      DILATION AND CURETTAGE OF UTERUS

## 2018-10-24 ENCOUNTER — APPOINTMENT (OUTPATIENT)
Dept: PHYSICAL THERAPY | Age: 73
End: 2018-10-24
Payer: COMMERCIAL

## 2018-10-24 LAB
ALBUMIN SERPL-MCNC: 3.4 G/DL (ref 3.1–4.9)
ALPHA-1-GLOBULIN: 0.3 G/DL (ref 0.2–0.4)
ALPHA-2-GLOBULIN: 0.9 G/DL (ref 0.4–1.1)
BETA GLOBULIN: 1.4 G/DL (ref 0.9–1.6)
ENA TO SSB (LA) ANTIBODY: 0 AU/ML (ref 0–40)
GAMMA GLOBULIN: 1.1 G/DL (ref 0.6–1.8)
SPE/IFE INTERPRETATION: NORMAL
SSA 52 (RO) (ENA) AB, IGG: 2 AU/ML (ref 0–40)
SSA 60 (RO) (ENA) AB, IGG: 4 AU/ML (ref 0–40)
TOTAL PROTEIN: 7.1 G/DL (ref 6.4–8.2)

## 2018-10-25 LAB — ANA BY IFA: NORMAL

## 2018-10-26 ENCOUNTER — HOSPITAL ENCOUNTER (OUTPATIENT)
Dept: PHYSICAL THERAPY | Age: 73
Setting detail: THERAPIES SERIES
Discharge: HOME | End: 2018-10-26
Payer: COMMERCIAL

## 2018-10-26 PROCEDURE — 97140 MANUAL THERAPY 1/> REGIONS: CPT

## 2018-10-26 PROCEDURE — 97110 THERAPEUTIC EXERCISES: CPT

## 2018-10-26 NOTE — FLOWSHEET NOTE
Physical Therapy Daily Treatment Note  Date:  10/26/2018    Patient Name:  Hu Oswald    :  1945  MRN: [id number]  Restrictions/Precautions:  C3-C7 Fusion in 2018  Medical/Treatment Diagnosis Information:   · Diagnosis: Cervical spondylosis with radiculopathy; S/P cervical spinal fusion C3-7 18  · Treatment Diagnosis: Poor endurance and decreased flexibility of cervical musculature, tightness in shoulder IR B/L, impaired posture, weakness in B/L UEs    Tracking Information:  Physician Information Referring Practitioner: Ruddy De Paz MD     Plan of Care Sent Date: 10/1/18 Signed Received: Y   Visit Count / Total Visits  8/10    Insurance Approved Visits  [facility]  Approved Dates:     Insurance Information PT Insurance Information: [employer] $40 copay per day, visits based on medical necessity     Progress Note/G-codes   []  Yes  [x]  No Next Due: 10th visit     Pain level: 0/10     Subjective: Pt has nothing new to report today. Objective:   Observation:   Test measurements:  10/12: AROM cervical rotation: 45 deg B/L    Exercises:  Exercise/Equipment Resistance/Repetitions Other comments   UBE 2 min fwd/2 min retro    TB : Green:     Yellow:  Opp diagonals x 10      Wall Lat slides x 10  Mini push ups x 10    towel roll along spine with CT- flex x 10, abd/scaption x 10   Wall stars yellow TB x 5B    Cervical stabilizers Towel Roll:   10/9: Done in supine   Pulleys Flex x 20     SOS IR x 10 B    Mat table                                                Other Therapeutic Activities:    10/17: Discussed with pt purchasing a theracane for home. 10/1/18 Pt was educated on PT POC, Diagnosis, Prognosis, pathomechanics as well as frequency and duration of scheduling future physical therapy appointments. Time was also taken on this day to answer all patient questions and participation in PT.        Home Exercise Program:    10/1/18 Patient was educated on home exercise program including

## 2018-10-29 ENCOUNTER — APPOINTMENT (OUTPATIENT)
Dept: PHYSICAL THERAPY | Age: 73
End: 2018-10-29
Payer: COMMERCIAL

## 2018-10-31 ENCOUNTER — NURSE ONLY (OUTPATIENT)
Dept: FAMILY MEDICINE CLINIC | Age: 73
End: 2018-10-31
Payer: COMMERCIAL

## 2018-10-31 ENCOUNTER — HOSPITAL ENCOUNTER (OUTPATIENT)
Dept: PHYSICAL THERAPY | Age: 73
Setting detail: THERAPIES SERIES
Discharge: HOME | End: 2018-10-31
Payer: COMMERCIAL

## 2018-10-31 DIAGNOSIS — E53.8 LOW VITAMIN B12 LEVEL: Primary | ICD-10-CM

## 2018-10-31 PROCEDURE — 97110 THERAPEUTIC EXERCISES: CPT

## 2018-10-31 PROCEDURE — 97140 MANUAL THERAPY 1/> REGIONS: CPT

## 2018-10-31 PROCEDURE — 96372 THER/PROPH/DIAG INJ SC/IM: CPT | Performed by: FAMILY MEDICINE

## 2018-10-31 RX ORDER — CYANOCOBALAMIN 1000 UG/ML
1000 INJECTION INTRAMUSCULAR; SUBCUTANEOUS ONCE
Status: COMPLETED | OUTPATIENT
Start: 2018-10-31 | End: 2018-10-31

## 2018-10-31 RX ADMIN — CYANOCOBALAMIN 1000 MCG: 1000 INJECTION INTRAMUSCULAR; SUBCUTANEOUS at 09:06

## 2018-11-08 ENCOUNTER — HOSPITAL ENCOUNTER (OUTPATIENT)
Dept: PHYSICAL THERAPY | Age: 73
Setting detail: THERAPIES SERIES
Discharge: HOME | End: 2018-11-08
Payer: COMMERCIAL

## 2018-11-08 PROCEDURE — 97110 THERAPEUTIC EXERCISES: CPT

## 2018-11-08 PROCEDURE — 97530 THERAPEUTIC ACTIVITIES: CPT

## 2018-11-08 PROCEDURE — G8986 CARRY D/C STATUS: HCPCS

## 2018-11-08 PROCEDURE — G8985 CARRY GOAL STATUS: HCPCS

## 2018-11-08 NOTE — DISCHARGE SUMMARY
Weeks: [] 1 week [] 4 weeks      [x] 2 days? [] 2 weeks [x] 5 weeks      [] 3 days   [] 3 weeks [] 6 weeks     Rehab Potential: [] Excellent [x] Good [] Fair  [] Poor     Goal Status:  [] Achieved [x] Partially Achieved  [] Not Achieved     Patient Status: [] Continue per initial plan of Care     [x] Patient now discharged      [] Additional visits requested, Please re-certify for additional visits:      Requested frequency/duration:  X/week for weeks    Electronically signed by:  Juvenal Harris PT DPT    If you have any questions or concerns, please don't hesitate to call.   Thank you for your referral.    Physician Signature:________________________________Date:__________________  By signing above, therapists plan is approved by physician

## 2018-11-12 ENCOUNTER — OFFICE VISIT (OUTPATIENT)
Dept: RHEUMATOLOGY | Age: 73
End: 2018-11-12
Payer: COMMERCIAL

## 2018-11-12 VITALS
WEIGHT: 240 LBS | BODY MASS INDEX: 37.67 KG/M2 | TEMPERATURE: 97.6 F | DIASTOLIC BLOOD PRESSURE: 100 MMHG | SYSTOLIC BLOOD PRESSURE: 170 MMHG | HEART RATE: 88 BPM | HEIGHT: 67 IN

## 2018-11-12 DIAGNOSIS — M19.90 INFLAMMATORY ARTHRITIS: Primary | ICD-10-CM

## 2018-11-12 DIAGNOSIS — M15.9 PRIMARY OSTEOARTHRITIS INVOLVING MULTIPLE JOINTS: ICD-10-CM

## 2018-11-12 DIAGNOSIS — G56.03 BILATERAL CARPAL TUNNEL SYNDROME: ICD-10-CM

## 2018-11-12 DIAGNOSIS — M19.90 INFLAMMATORY ARTHRITIS: ICD-10-CM

## 2018-11-12 DIAGNOSIS — G56.23 LESIONS OF BOTH ULNAR NERVES: ICD-10-CM

## 2018-11-12 DIAGNOSIS — R79.82 ELEVATED C-REACTIVE PROTEIN (CRP): ICD-10-CM

## 2018-11-12 DIAGNOSIS — R70.0 ELEVATED SED RATE: ICD-10-CM

## 2018-11-12 LAB
C-REACTIVE PROTEIN: 17.7 MG/L (ref 0–5.1)
SEDIMENTATION RATE, ERYTHROCYTE: 41 MM/HR (ref 0–30)

## 2018-11-12 PROCEDURE — 99214 OFFICE O/P EST MOD 30 MIN: CPT | Performed by: INTERNAL MEDICINE

## 2018-11-12 RX ORDER — HYDROXYCHLOROQUINE SULFATE 200 MG/1
200 TABLET, FILM COATED ORAL 2 TIMES DAILY
Qty: 60 TABLET | Refills: 5 | Status: SHIPPED | OUTPATIENT
Start: 2018-11-12 | End: 2018-11-13 | Stop reason: SDUPTHER

## 2018-11-12 RX ORDER — PREDNISONE 1 MG/1
TABLET ORAL
Qty: 45 TABLET | Refills: 0 | Status: SHIPPED | OUTPATIENT
Start: 2018-11-12 | End: 2019-01-07

## 2018-11-12 NOTE — PROGRESS NOTES
IMAGING  Outside data reviewed and in HPI    Lab Results   Component Value Date    WBC 9.3 08/23/2018    RBC 4.19 08/23/2018    HGB 12.5 08/23/2018    HCT 38.5 08/23/2018     08/23/2018    MCV 91.7 08/23/2018    MCH 29.7 08/23/2018    MCHC 32.4 08/23/2018    RDW 14.7 08/23/2018    SEGSPCT 67.1 09/17/2013    LYMPHOPCT 15.7 08/23/2018    MONOPCT 9.3 08/23/2018    BASOPCT 0.8 08/23/2018    MONOSABS 0.9 08/23/2018    LYMPHSABS 1.5 08/23/2018    EOSABS 0.3 08/23/2018    BASOSABS 0.1 08/23/2018    DIFFTYPE Auto/Scn 07/04/2012       Chemistry        Component Value Date/Time     08/23/2018 1150    K 4.4 08/23/2018 1150     08/23/2018 1150    CO2 24 08/23/2018 1150    BUN 15 09/26/2018 0635    CREATININE 0.6 09/26/2018 0635        Component Value Date/Time    CALCIUM 9.6 08/23/2018 1150    ALKPHOS 126 08/23/2018 1150    AST 21 08/23/2018 1150    ALT 24 08/23/2018 1150    BILITOT 0.3 08/23/2018 1150          Lab Results   Component Value Date    SEDRATE 45 (H) 10/22/2018     Lab Results   Component Value Date    CRP 21.8 (H) 10/22/2018     No results found for: KELLY, YOLY, SSA, SSB, C3, C4  Lab Results   Component Value Date    RF <10.0 09/18/2018    CCPABIGG 3 09/18/2018     No results found for: KELLY, ANATITER, ANAINT, PATH  No results found for: DSDNAG, DSDNAIGGIFA  Lab Results   Component Value Date    SSALAAB 0 10/22/2018     No results found for: SMAB, RNPAB  No results found for: CENTABIGG  No results found for: C3, C4, ACE  No results found for: Kristan Víctor    Lab Results   Component Value Date    TSHFT4 3.45 09/18/2018    TSH 2.90 01/04/2016       ASSESSMENT AND PLAN      Assessment/Plan:      ASSESSMENT:    1. Inflammatory arthritis    2. Primary osteoarthritis involving multiple joints    3. Elevated C-reactive protein (CRP)    4. Elevated sed rate    5. Lesions of both ulnar nerves    6. Bilateral carpal tunnel syndrome        PLAN:   1.  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 2. Primary osteoarthritis involving multiple joints  -Continue naproxen as needed    3. Lesions of both ulnar nerves  Advised to make an appointment with Dr. Barb Kramer       The patient indicates understanding of these issues and agrees with the plan. Return in about 3 months (around 2/12/2019). The risks and benefits of my recommendations, as well as other treatment options, benefits and side effects were discussed with the patient. All questions were answered. ######################################################################    I thank you for giving me the opportunity to participate in Shelly Lipoma care. If you have any questions or concerns please feel free to contact me. I look forward to following  Tae Hughes along with you. Electronically signed by: Glenroy Higginbotham MD, 11/12/2018 9:49 AM    Documentation was done using voice recognition dragon software. Every effort was made to ensure accuracy; however, inadvertent unintentional computerized transcription errors may be present.

## 2018-11-13 ENCOUNTER — TELEPHONE (OUTPATIENT)
Dept: INTERNAL MEDICINE CLINIC | Age: 73
End: 2018-11-13

## 2018-11-13 DIAGNOSIS — M19.90 INFLAMMATORY ARTHRITIS: ICD-10-CM

## 2018-11-13 RX ORDER — HYDROXYCHLOROQUINE SULFATE 200 MG/1
200 TABLET, FILM COATED ORAL 2 TIMES DAILY
Qty: 60 TABLET | Refills: 5 | Status: CANCELLED | OUTPATIENT
Start: 2018-11-13

## 2018-11-13 RX ORDER — HYDROXYCHLOROQUINE SULFATE 200 MG/1
200 TABLET, FILM COATED ORAL 2 TIMES DAILY
Qty: 180 TABLET | Refills: 0 | Status: SHIPPED | OUTPATIENT
Start: 2018-11-13 | End: 2019-02-11 | Stop reason: SDUPTHER

## 2018-11-14 ENCOUNTER — NURSE ONLY (OUTPATIENT)
Dept: FAMILY MEDICINE CLINIC | Age: 73
End: 2018-11-14
Payer: COMMERCIAL

## 2018-11-14 DIAGNOSIS — E53.8 LOW VITAMIN B12 LEVEL: Primary | ICD-10-CM

## 2018-11-14 PROCEDURE — 96372 THER/PROPH/DIAG INJ SC/IM: CPT | Performed by: FAMILY MEDICINE

## 2018-11-14 RX ORDER — CYANOCOBALAMIN 1000 UG/ML
1000 INJECTION INTRAMUSCULAR; SUBCUTANEOUS ONCE
Status: COMPLETED | OUTPATIENT
Start: 2018-11-14 | End: 2018-11-14

## 2018-11-14 RX ADMIN — CYANOCOBALAMIN 1000 MCG: 1000 INJECTION INTRAMUSCULAR; SUBCUTANEOUS at 09:06

## 2018-11-16 NOTE — PROGRESS NOTES
Patient not reached. Preop instructions left on voice mail.  Number___[phone]____________    -Date_12/6/18______time_0930______arrival___FEC 0800_________  -Nothing to eat or drink after midnight  -Responsible adult 25 or older to stay on site while you are here and drive you home and stay with you after  -Follow any instructions your doctors office has given you  -Bring a complete list of all your medications and supplements  -If you normally take the following medications in the morning please do so with a small    sip of water-heart,blood pressure,seizure,breathing or thyroid-avoid water pillls and any blood pressure medications ending in \"walter\" or \"pril\"  -You may use your inhalers  -Take half of your normal dose of any long acting insulins the night before-do not take    any diabetic medications in the morning  -Follow your doctors instructions regarding blood thinners  -Any questions call your surgeons office  Anesthesia attempts to review all Endo charts prior to surgery and will place any PAT orders,Surgery patients will have orders placed based on history in chart which may not be complete  ENDOSCOPY PATIENTS ONLY-FOLLOW YOUR DOCTORS BOWEL PREP INSTRUCTIONS,THIS MAY INCLUDE TAKING A SECOND PORTION OF YOUR PREP AFTER MIDNIGHT

## 2018-11-19 ENCOUNTER — ANESTHESIA EVENT (OUTPATIENT)
Dept: ENDOSCOPY | Age: 73
End: 2018-11-19
Payer: COMMERCIAL

## 2018-11-26 RX ORDER — LOSARTAN POTASSIUM 50 MG/1
TABLET ORAL
Qty: 14 TABLET | Refills: 0 | Status: SHIPPED | OUTPATIENT
Start: 2018-11-26 | End: 2018-12-04 | Stop reason: SDUPTHER

## 2018-11-26 RX ORDER — LOSARTAN POTASSIUM 50 MG/1
TABLET ORAL
Qty: 90 TABLET | Refills: 0 | Status: SHIPPED | OUTPATIENT
Start: 2018-11-26 | End: 2018-11-26 | Stop reason: SDUPTHER

## 2018-12-03 RX ORDER — GABAPENTIN 300 MG/1
CAPSULE ORAL
Qty: 90 CAPSULE | Refills: 0 | Status: SHIPPED | OUTPATIENT
Start: 2018-12-03 | End: 2019-01-07

## 2018-12-04 ENCOUNTER — TELEPHONE (OUTPATIENT)
Dept: FAMILY MEDICINE CLINIC | Age: 73
End: 2018-12-04

## 2018-12-04 RX ORDER — LOSARTAN POTASSIUM 50 MG/1
TABLET ORAL
Qty: 90 TABLET | Refills: 0 | Status: SHIPPED | OUTPATIENT
Start: 2018-12-04 | End: 2019-06-11 | Stop reason: SDUPTHER

## 2018-12-06 ENCOUNTER — HOSPITAL ENCOUNTER (OUTPATIENT)
Age: 73
Setting detail: OUTPATIENT SURGERY
Discharge: HOME | End: 2018-12-06
Attending: INTERNAL MEDICINE | Admitting: INTERNAL MEDICINE
Payer: COMMERCIAL

## 2018-12-06 ENCOUNTER — ANESTHESIA (OUTPATIENT)
Dept: ENDOSCOPY | Age: 73
End: 2018-12-06
Payer: COMMERCIAL

## 2018-12-06 VITALS
DIASTOLIC BLOOD PRESSURE: 95 MMHG | OXYGEN SATURATION: 96 % | WEIGHT: 230 LBS | BODY MASS INDEX: 36.1 KG/M2 | HEART RATE: 74 BPM | RESPIRATION RATE: 16 BRPM | SYSTOLIC BLOOD PRESSURE: 113 MMHG | TEMPERATURE: 98 F | HEIGHT: 67 IN

## 2018-12-06 VITALS — DIASTOLIC BLOOD PRESSURE: 63 MMHG | SYSTOLIC BLOOD PRESSURE: 102 MMHG | OXYGEN SATURATION: 92 %

## 2018-12-06 PROCEDURE — 3700000000 HC ANESTHESIA ATTENDED CARE: Performed by: INTERNAL MEDICINE

## 2018-12-06 PROCEDURE — 2580000003 HC RX 258: Performed by: NURSE ANESTHETIST, CERTIFIED REGISTERED

## 2018-12-06 PROCEDURE — 7100000010 HC PHASE II RECOVERY - FIRST 15 MIN: Performed by: INTERNAL MEDICINE

## 2018-12-06 PROCEDURE — 3609009500 HC COLONOSCOPY DIAGNOSTIC OR SCREENING: Performed by: INTERNAL MEDICINE

## 2018-12-06 PROCEDURE — 3700000001 HC ADD 15 MINUTES (ANESTHESIA): Performed by: INTERNAL MEDICINE

## 2018-12-06 PROCEDURE — 2709999900 HC NON-CHARGEABLE SUPPLY: Performed by: INTERNAL MEDICINE

## 2018-12-06 PROCEDURE — 7100000011 HC PHASE II RECOVERY - ADDTL 15 MIN: Performed by: INTERNAL MEDICINE

## 2018-12-06 PROCEDURE — 2580000003 HC RX 258: Performed by: ANESTHESIOLOGY

## 2018-12-06 PROCEDURE — 2500000003 HC RX 250 WO HCPCS: Performed by: NURSE ANESTHETIST, CERTIFIED REGISTERED

## 2018-12-06 RX ORDER — LIDOCAINE HYDROCHLORIDE 10 MG/ML
1 INJECTION, SOLUTION EPIDURAL; INFILTRATION; INTRACAUDAL; PERINEURAL
Status: DISCONTINUED | OUTPATIENT
Start: 2018-12-06 | End: 2018-12-06 | Stop reason: HOSPADM

## 2018-12-06 RX ORDER — PROPOFOL 10 MG/ML
INJECTION, EMULSION INTRAVENOUS CONTINUOUS PRN
Status: DISCONTINUED | OUTPATIENT
Start: 2018-12-06 | End: 2018-12-06 | Stop reason: SDUPTHER

## 2018-12-06 RX ORDER — SODIUM CHLORIDE 0.9 % (FLUSH) 0.9 %
10 SYRINGE (ML) INJECTION PRN
Status: DISCONTINUED | OUTPATIENT
Start: 2018-12-06 | End: 2018-12-06 | Stop reason: HOSPADM

## 2018-12-06 RX ORDER — SODIUM CHLORIDE 0.9 % (FLUSH) 0.9 %
10 SYRINGE (ML) INJECTION EVERY 12 HOURS SCHEDULED
Status: DISCONTINUED | OUTPATIENT
Start: 2018-12-06 | End: 2018-12-06 | Stop reason: HOSPADM

## 2018-12-06 RX ORDER — SODIUM CHLORIDE 9 MG/ML
INJECTION, SOLUTION INTRAVENOUS CONTINUOUS
Status: DISCONTINUED | OUTPATIENT
Start: 2018-12-06 | End: 2018-12-06 | Stop reason: HOSPADM

## 2018-12-06 RX ORDER — LIDOCAINE HYDROCHLORIDE 20 MG/ML
INJECTION, SOLUTION INFILTRATION; PERINEURAL PRN
Status: DISCONTINUED | OUTPATIENT
Start: 2018-12-06 | End: 2018-12-06 | Stop reason: SDUPTHER

## 2018-12-06 RX ORDER — SODIUM CHLORIDE 9 MG/ML
INJECTION, SOLUTION INTRAVENOUS CONTINUOUS PRN
Status: DISCONTINUED | OUTPATIENT
Start: 2018-12-06 | End: 2018-12-06 | Stop reason: SDUPTHER

## 2018-12-06 RX ADMIN — LIDOCAINE HYDROCHLORIDE 100 MG: 20 INJECTION, SOLUTION INFILTRATION; PERINEURAL at 10:08

## 2018-12-06 RX ADMIN — PROPOFOL 140 MCG/KG/MIN: 10 INJECTION, EMULSION INTRAVENOUS at 10:08

## 2018-12-06 RX ADMIN — SODIUM CHLORIDE: 9 INJECTION, SOLUTION INTRAVENOUS at 10:00

## 2018-12-06 RX ADMIN — SODIUM CHLORIDE: 9 INJECTION, SOLUTION INTRAVENOUS at 09:25

## 2018-12-06 ASSESSMENT — ENCOUNTER SYMPTOMS: SHORTNESS OF BREATH: 1

## 2018-12-06 ASSESSMENT — PAIN - FUNCTIONAL ASSESSMENT: PAIN_FUNCTIONAL_ASSESSMENT: 0-10

## 2018-12-06 ASSESSMENT — PAIN SCALES - GENERAL
PAINLEVEL_OUTOF10: 0

## 2018-12-06 NOTE — ANESTHESIA PRE PROCEDURE
K 4.4 08/23/2018     08/23/2018    CO2 24 08/23/2018    BUN 15 09/26/2018    CREATININE 0.6 09/26/2018    GFRAA >60 09/26/2018    GFRAA >60 04/02/2013    AGRATIO 1.4 08/23/2018    LABGLOM >60 09/26/2018    LABGLOM 97 09/17/2013    GLUCOSE 107 08/23/2018    PROT 7.1 10/22/2018    CALCIUM 9.6 08/23/2018    BILITOT 0.3 08/23/2018    ALKPHOS 126 08/23/2018    AST 21 08/23/2018    ALT 24 08/23/2018       POC Tests: No results for input(s): POCGLU, POCNA, POCK, POCCL, POCBUN, POCHEMO, POCHCT in the last 72 hours. Coags:   Lab Results   Component Value Date    PROTIME 12.1 04/12/2018    INR 1.07 04/12/2018    APTT 31.0 04/12/2018       HCG (If Applicable): No results found for: PREGTESTUR, PREGSERUM, HCG, HCGQUANT     ABGs: No results found for: PHART, PO2ART, ZPY2BSY, CMN9RKP, BEART, W8LWOJCP     Type & Screen (If Applicable):  No results found for: LABABO, LABRH    Anesthesia Evaluation    Airway: Mallampati: II  TM distance: >3 FB   Neck ROM: full  Mouth opening: > = 3 FB Dental:          Pulmonary:   (+) COPD:  shortness of breath:                             Cardiovascular:    (+) hypertension:,         Rhythm: regular  Rate: normal                    Neuro/Psych:   (+) neuromuscular disease:,             GI/Hepatic/Renal:             Endo/Other:    (+) Diabetes, . Abdominal:           Vascular:                                        Anesthesia Plan      MAC     ASA 2       Induction: intravenous. Anesthetic plan and risks discussed with patient. Plan discussed with CRNA.                   Neris Kraus MD   12/6/2018

## 2018-12-12 ENCOUNTER — OFFICE VISIT (OUTPATIENT)
Dept: ORTHOPEDIC SURGERY | Age: 73
End: 2018-12-12
Payer: COMMERCIAL

## 2018-12-12 VITALS
WEIGHT: 240 LBS | SYSTOLIC BLOOD PRESSURE: 154 MMHG | BODY MASS INDEX: 37.67 KG/M2 | DIASTOLIC BLOOD PRESSURE: 87 MMHG | HEIGHT: 67 IN | HEART RATE: 78 BPM

## 2018-12-12 DIAGNOSIS — G56.03 BILATERAL CARPAL TUNNEL SYNDROME: Primary | ICD-10-CM

## 2018-12-12 PROCEDURE — 20526 THER INJECTION CARP TUNNEL: CPT | Performed by: ORTHOPAEDIC SURGERY

## 2018-12-12 PROCEDURE — 99214 OFFICE O/P EST MOD 30 MIN: CPT | Performed by: ORTHOPAEDIC SURGERY

## 2018-12-12 NOTE — Clinical Note
Dear  Timmy Tay MD,  Thank you very much for your referral or Ms. Mynor Murillo to me for evaluation and treatment of her Hand & Wrist condition. I appreciate your confidence in me and thank you for allowing me the opportunity to care for your patients. If I can be of any further assistance to you on this or any other patient, please do not hesitate to contact me. Sincerely,  Erlin Ramirez.  Nieves Harvey MD

## 2018-12-12 NOTE — PATIENT INSTRUCTIONS
Information & Instructions   After Finger, Hand, Wrist, or Elbow Injection    Genny Burk MD    You have received an injection of local anesthetic (Bupivicaine without Epinephrine) for comfort & a steroid (Kenalog) for its strong anti-inflammatory effects. In order to give the medication a chance to reduce your inflammation and discomfort, it is recommended that you take it easy for a day or so. You may use your hand and arm as you feel comfortable, but you should avoid highly strenuous activity and heavy use for several days. Relief from the injection will often not begin for several days, and you may not feel full relief for up to one month. It is not uncommon to experience some local discomfort or pain at or around the injection site for a few days. To relieve these symptoms you may do the following if you feel necessary:       Apply ice to the affected area 20 minutes on and 20 minutes off. Do not apply ice directly to the skin. Use a thin layer (T-shirt, pillowcase, towel, etc.) to protect the skin. - If allowed by your other medical physicians, you may take -     2 Tylenol extra strength tablets every 4-6 hours       1-2 Aleve tablets twice a day     2-3 Advil tablets two to three times a day    If you are diabetic, the steroid medication may increase your blood sugar, so you are advised to monitor your sugar more closely so you can adjust it accordingly for a few days following your injection. If you need assistance with the control of you blood sugar, please contact you primary care physician for further advice. I will request that you please call the office one month after your injection at [phone] if you have not experienced relief of your symptoms (unless I have instructed you otherwise). If your injection has given you good relief of you symptoms as expected, then you only need to call the office if your symptoms return.

## 2018-12-12 NOTE — PROGRESS NOTES
Kenalog:  NDC: [id number]  Lot Number: [id number]  Expiration Date: [id number]
exam.  The thenar musculature is mildly atrophied & weakened. Examination for Cubital Tunnel Syndrome shows no tenderness to palpation at the medial & lateral epicondyles of the Humerus. The Ulnar Nerve rests securely behind the medial epicondyle without subluxation upon elbow flexion. Elbow flexion-compression test is negative, and there is no Tinnel's Sign over the Cubital Tunnel. The Ulnar Nerve innervated intrinsic musculature is without atrophy or weakness. Examination for Stenosing Tenosynovitis demonstrates no evidence of tenderness, thickening or nodularity at the A-1 pulleys of the digits bilaterally. There no palpable triggering or any finger or thumb. Cervical Spine: Active Range of Motion  is Decreased with pain at extremes. Impression:  Ms. Salazar Thompson is showing evidence of residual left hand symptoms which could be consistent with  Carpal Tunnel Syndrome after previous treatment. She requests additional treatment at this time. Plan:  After discussion of the treatment options available for her neurologic symptoms and review of Ms. Keyla Davis Geniabridget's previous treatments, we have selected to proceed with a DIAGNOSTIC INJECTION to the left carpal tunnel(s). I have outlined for her the nature of the injection, and the pre, rowan and post injection considerations and the appropriate expectations for this injection. We discussed appropriate expectations for symptom resolution & likely duration of the relief. I have specifically explained to Ms. Salazar Thompson, that the intent of the injection is to determine if she gains any relief of her presenting symptoms over the next 3-4 weeks. She is advised that any symptoms arising from carpal tunnel syndrome will respond to the injection, and that she is to understand that any symptoms that do not respond to the injection are arising from an alternate etiology.   She voiced an understanding of the intent of the injection and had her questions

## 2018-12-18 ENCOUNTER — NURSE ONLY (OUTPATIENT)
Dept: FAMILY MEDICINE CLINIC | Age: 73
End: 2018-12-18
Payer: COMMERCIAL

## 2018-12-18 DIAGNOSIS — D50.9 IRON DEFICIENCY ANEMIA, UNSPECIFIED IRON DEFICIENCY ANEMIA TYPE: Chronic | ICD-10-CM

## 2018-12-18 PROCEDURE — 96372 THER/PROPH/DIAG INJ SC/IM: CPT | Performed by: FAMILY MEDICINE

## 2018-12-18 RX ORDER — CYANOCOBALAMIN 1000 UG/ML
1000 INJECTION INTRAMUSCULAR; SUBCUTANEOUS ONCE
Status: COMPLETED | OUTPATIENT
Start: 2018-12-18 | End: 2018-12-18

## 2018-12-18 RX ADMIN — CYANOCOBALAMIN 1000 MCG: 1000 INJECTION INTRAMUSCULAR; SUBCUTANEOUS at 10:27

## 2018-12-19 RX ORDER — LOSARTAN POTASSIUM 50 MG/1
TABLET ORAL
Qty: 90 TABLET | Refills: 0 | OUTPATIENT
Start: 2018-12-19

## 2019-01-03 ENCOUNTER — HOSPITAL ENCOUNTER (OUTPATIENT)
Age: 74
Discharge: HOME | End: 2019-01-03
Payer: COMMERCIAL

## 2019-01-03 DIAGNOSIS — E78.5 HYPERLIPIDEMIA, UNSPECIFIED HYPERLIPIDEMIA TYPE: Chronic | ICD-10-CM

## 2019-01-03 DIAGNOSIS — E11.9 TYPE 2 DIABETES MELLITUS WITHOUT COMPLICATION, WITHOUT LONG-TERM CURRENT USE OF INSULIN (HCC): ICD-10-CM

## 2019-01-03 DIAGNOSIS — I10 ESSENTIAL HYPERTENSION: Chronic | ICD-10-CM

## 2019-01-03 LAB
A/G RATIO: 1.3 (ref 1.1–2.2)
ALBUMIN SERPL-MCNC: 4.1 G/DL (ref 3.4–5)
ALP BLD-CCNC: 122 U/L (ref 40–129)
ALT SERPL-CCNC: 21 U/L (ref 10–40)
ANION GAP SERPL CALCULATED.3IONS-SCNC: 16 MMOL/L (ref 3–16)
AST SERPL-CCNC: 14 U/L (ref 15–37)
BILIRUB SERPL-MCNC: 0.4 MG/DL (ref 0–1)
BUN BLDV-MCNC: 14 MG/DL (ref 7–20)
CALCIUM SERPL-MCNC: 9.7 MG/DL (ref 8.3–10.6)
CHLORIDE BLD-SCNC: 100 MMOL/L (ref 99–110)
CHOLESTEROL, TOTAL: 243 MG/DL (ref 0–199)
CO2: 26 MMOL/L (ref 21–32)
CREAT SERPL-MCNC: 0.6 MG/DL (ref 0.6–1.2)
ESTIMATED AVERAGE GLUCOSE: 185.8 MG/DL
GFR AFRICAN AMERICAN: >60
GFR NON-AFRICAN AMERICAN: >60
GLOBULIN: 3.1 G/DL
GLUCOSE BLD-MCNC: 120 MG/DL (ref 70–99)
HBA1C MFR BLD: 8.1 %
HDLC SERPL-MCNC: 77 MG/DL (ref 40–60)
LDL CHOLESTEROL CALCULATED: 149 MG/DL
POTASSIUM SERPL-SCNC: 4.2 MMOL/L (ref 3.5–5.1)
SODIUM BLD-SCNC: 142 MMOL/L (ref 136–145)
TOTAL PROTEIN: 7.2 G/DL (ref 6.4–8.2)
TRIGL SERPL-MCNC: 84 MG/DL (ref 0–150)
VLDLC SERPL CALC-MCNC: 17 MG/DL

## 2019-01-03 PROCEDURE — 36415 COLL VENOUS BLD VENIPUNCTURE: CPT

## 2019-01-03 PROCEDURE — 80061 LIPID PANEL: CPT

## 2019-01-03 PROCEDURE — 83036 HEMOGLOBIN GLYCOSYLATED A1C: CPT

## 2019-01-03 PROCEDURE — 80053 COMPREHEN METABOLIC PANEL: CPT

## 2019-01-07 ENCOUNTER — OFFICE VISIT (OUTPATIENT)
Dept: FAMILY MEDICINE CLINIC | Age: 74
End: 2019-01-07
Payer: COMMERCIAL

## 2019-01-07 VITALS
SYSTOLIC BLOOD PRESSURE: 124 MMHG | OXYGEN SATURATION: 96 % | BODY MASS INDEX: 37.35 KG/M2 | DIASTOLIC BLOOD PRESSURE: 82 MMHG | HEART RATE: 76 BPM | RESPIRATION RATE: 16 BRPM | WEIGHT: 238.5 LBS

## 2019-01-07 DIAGNOSIS — E11.9 TYPE 2 DIABETES MELLITUS WITHOUT COMPLICATION, WITHOUT LONG-TERM CURRENT USE OF INSULIN (HCC): ICD-10-CM

## 2019-01-07 DIAGNOSIS — E78.5 HYPERLIPIDEMIA, UNSPECIFIED HYPERLIPIDEMIA TYPE: Chronic | ICD-10-CM

## 2019-01-07 DIAGNOSIS — D50.9 IRON DEFICIENCY ANEMIA, UNSPECIFIED IRON DEFICIENCY ANEMIA TYPE: Primary | Chronic | ICD-10-CM

## 2019-01-07 DIAGNOSIS — I10 ESSENTIAL HYPERTENSION: Chronic | ICD-10-CM

## 2019-01-07 DIAGNOSIS — J44.9 COPD, MODERATE (HCC): ICD-10-CM

## 2019-01-07 PROBLEM — G56.02 LEFT CARPAL TUNNEL SYNDROME: Status: RESOLVED | Noted: 2017-10-11 | Resolved: 2019-01-07

## 2019-01-07 PROCEDURE — 96372 THER/PROPH/DIAG INJ SC/IM: CPT | Performed by: FAMILY MEDICINE

## 2019-01-07 PROCEDURE — 99214 OFFICE O/P EST MOD 30 MIN: CPT | Performed by: FAMILY MEDICINE

## 2019-01-07 RX ORDER — ATENOLOL 50 MG/1
TABLET ORAL
Qty: 90 TABLET | Refills: 1 | Status: SHIPPED | OUTPATIENT
Start: 2019-01-07 | End: 2019-05-22 | Stop reason: SDUPTHER

## 2019-01-07 RX ORDER — PRAVASTATIN SODIUM 40 MG
40 TABLET ORAL DAILY
Qty: 90 TABLET | Refills: 1 | Status: SHIPPED | OUTPATIENT
Start: 2019-01-07 | End: 2019-05-22 | Stop reason: SDUPTHER

## 2019-01-07 RX ORDER — CYANOCOBALAMIN 1000 UG/ML
1000 INJECTION INTRAMUSCULAR; SUBCUTANEOUS ONCE
Status: COMPLETED | OUTPATIENT
Start: 2019-01-07 | End: 2019-01-07

## 2019-01-07 RX ADMIN — CYANOCOBALAMIN 1000 MCG: 1000 INJECTION INTRAMUSCULAR; SUBCUTANEOUS at 13:40

## 2019-01-07 ASSESSMENT — ENCOUNTER SYMPTOMS
COUGH: 0
SHORTNESS OF BREATH: 0

## 2019-02-11 DIAGNOSIS — M19.90 INFLAMMATORY ARTHRITIS: ICD-10-CM

## 2019-02-11 RX ORDER — HYDROXYCHLOROQUINE SULFATE 200 MG/1
TABLET, FILM COATED ORAL
Qty: 180 TABLET | Refills: 0 | Status: SHIPPED | OUTPATIENT
Start: 2019-02-11 | End: 2019-02-18 | Stop reason: SDUPTHER

## 2019-02-18 ENCOUNTER — NURSE ONLY (OUTPATIENT)
Dept: FAMILY MEDICINE CLINIC | Age: 74
End: 2019-02-18
Payer: COMMERCIAL

## 2019-02-18 ENCOUNTER — TELEPHONE (OUTPATIENT)
Dept: ORTHOPEDIC SURGERY | Age: 74
End: 2019-02-18

## 2019-02-18 ENCOUNTER — OFFICE VISIT (OUTPATIENT)
Dept: RHEUMATOLOGY | Age: 74
End: 2019-02-18
Payer: COMMERCIAL

## 2019-02-18 VITALS
SYSTOLIC BLOOD PRESSURE: 137 MMHG | DIASTOLIC BLOOD PRESSURE: 95 MMHG | WEIGHT: 242.5 LBS | HEIGHT: 67 IN | HEART RATE: 76 BPM | BODY MASS INDEX: 38.06 KG/M2

## 2019-02-18 DIAGNOSIS — Z79.899 HIGH RISK MEDICATION USE: ICD-10-CM

## 2019-02-18 DIAGNOSIS — M19.90 INFLAMMATORY ARTHRITIS: ICD-10-CM

## 2019-02-18 DIAGNOSIS — M15.9 PRIMARY OSTEOARTHRITIS INVOLVING MULTIPLE JOINTS: ICD-10-CM

## 2019-02-18 DIAGNOSIS — G56.23 LESIONS OF BOTH ULNAR NERVES: ICD-10-CM

## 2019-02-18 DIAGNOSIS — D50.9 IRON DEFICIENCY ANEMIA, UNSPECIFIED IRON DEFICIENCY ANEMIA TYPE: Primary | Chronic | ICD-10-CM

## 2019-02-18 DIAGNOSIS — M19.90 INFLAMMATORY ARTHRITIS: Primary | ICD-10-CM

## 2019-02-18 LAB
A/G RATIO: 1.4 (ref 1.1–2.2)
ALBUMIN SERPL-MCNC: 4.2 G/DL (ref 3.4–5)
ALP BLD-CCNC: 150 U/L (ref 40–129)
ALT SERPL-CCNC: 14 U/L (ref 10–40)
ANION GAP SERPL CALCULATED.3IONS-SCNC: 16 MMOL/L (ref 3–16)
AST SERPL-CCNC: 12 U/L (ref 15–37)
BASOPHILS ABSOLUTE: 0 K/UL (ref 0–0.2)
BASOPHILS RELATIVE PERCENT: 0.5 %
BILIRUB SERPL-MCNC: 0.4 MG/DL (ref 0–1)
BUN BLDV-MCNC: 12 MG/DL (ref 7–20)
C-REACTIVE PROTEIN: 29.7 MG/L (ref 0–5.1)
CALCIUM SERPL-MCNC: 10 MG/DL (ref 8.3–10.6)
CHLORIDE BLD-SCNC: 96 MMOL/L (ref 99–110)
CO2: 27 MMOL/L (ref 21–32)
CREAT SERPL-MCNC: 0.5 MG/DL (ref 0.6–1.2)
EOSINOPHILS ABSOLUTE: 0.3 K/UL (ref 0–0.6)
EOSINOPHILS RELATIVE PERCENT: 2.9 %
GFR AFRICAN AMERICAN: >60
GFR NON-AFRICAN AMERICAN: >60
GLOBULIN: 2.9 G/DL
GLUCOSE BLD-MCNC: 164 MG/DL (ref 70–99)
HCT VFR BLD CALC: 40.7 % (ref 36–48)
HEMOGLOBIN: 13.4 G/DL (ref 12–16)
LYMPHOCYTES ABSOLUTE: 1.4 K/UL (ref 1–5.1)
LYMPHOCYTES RELATIVE PERCENT: 15.2 %
MCH RBC QN AUTO: 31 PG (ref 26–34)
MCHC RBC AUTO-ENTMCNC: 32.9 G/DL (ref 31–36)
MCV RBC AUTO: 94.2 FL (ref 80–100)
MONOCYTES ABSOLUTE: 0.8 K/UL (ref 0–1.3)
MONOCYTES RELATIVE PERCENT: 8.4 %
NEUTROPHILS ABSOLUTE: 6.7 K/UL (ref 1.7–7.7)
NEUTROPHILS RELATIVE PERCENT: 73 %
PDW BLD-RTO: 14.4 % (ref 12.4–15.4)
PLATELET # BLD: 357 K/UL (ref 135–450)
PMV BLD AUTO: 9.3 FL (ref 5–10.5)
POTASSIUM SERPL-SCNC: 4.8 MMOL/L (ref 3.5–5.1)
RBC # BLD: 4.32 M/UL (ref 4–5.2)
SEDIMENTATION RATE, ERYTHROCYTE: 47 MM/HR (ref 0–30)
SODIUM BLD-SCNC: 139 MMOL/L (ref 136–145)
TOTAL PROTEIN: 7.1 G/DL (ref 6.4–8.2)
WBC # BLD: 9.2 K/UL (ref 4–11)

## 2019-02-18 PROCEDURE — 99214 OFFICE O/P EST MOD 30 MIN: CPT | Performed by: INTERNAL MEDICINE

## 2019-02-18 PROCEDURE — 96372 THER/PROPH/DIAG INJ SC/IM: CPT | Performed by: FAMILY MEDICINE

## 2019-02-18 RX ORDER — HYDROXYCHLOROQUINE SULFATE 200 MG/1
TABLET, FILM COATED ORAL
Qty: 180 TABLET | Refills: 1 | Status: SHIPPED | OUTPATIENT
Start: 2019-02-18 | End: 2019-09-17

## 2019-02-18 RX ORDER — CYANOCOBALAMIN 1000 UG/ML
1000 INJECTION INTRAMUSCULAR; SUBCUTANEOUS ONCE
Status: COMPLETED | OUTPATIENT
Start: 2019-02-18 | End: 2019-02-18

## 2019-02-18 RX ORDER — FLUOXETINE HYDROCHLORIDE 20 MG/5ML
20 LIQUID ORAL DAILY
COMMUNITY
End: 2019-05-22

## 2019-02-18 RX ORDER — FOLIC ACID 1 MG/1
1 TABLET ORAL DAILY
Qty: 30 TABLET | Refills: 5 | Status: SHIPPED | OUTPATIENT
Start: 2019-02-18 | End: 2019-03-26 | Stop reason: SDUPTHER

## 2019-02-18 RX ADMIN — CYANOCOBALAMIN 1000 MCG: 1000 INJECTION INTRAMUSCULAR; SUBCUTANEOUS at 10:29

## 2019-02-21 ENCOUNTER — HOSPITAL ENCOUNTER (OUTPATIENT)
Age: 74
Discharge: HOME | End: 2019-02-21
Payer: COMMERCIAL

## 2019-02-21 DIAGNOSIS — E78.5 HYPERLIPIDEMIA, UNSPECIFIED HYPERLIPIDEMIA TYPE: Chronic | ICD-10-CM

## 2019-02-21 DIAGNOSIS — E11.9 TYPE 2 DIABETES MELLITUS WITHOUT COMPLICATION, WITHOUT LONG-TERM CURRENT USE OF INSULIN (HCC): ICD-10-CM

## 2019-02-21 LAB
A/G RATIO: 1.2 (ref 1.1–2.2)
ALBUMIN SERPL-MCNC: 4.1 G/DL (ref 3.4–5)
ALP BLD-CCNC: 120 U/L (ref 40–129)
ALT SERPL-CCNC: 19 U/L (ref 10–40)
ANION GAP SERPL CALCULATED.3IONS-SCNC: 14 MMOL/L (ref 3–16)
AST SERPL-CCNC: 19 U/L (ref 15–37)
BILIRUB SERPL-MCNC: 0.4 MG/DL (ref 0–1)
BUN BLDV-MCNC: 12 MG/DL (ref 7–20)
CALCIUM SERPL-MCNC: 9.5 MG/DL (ref 8.3–10.6)
CHLORIDE BLD-SCNC: 100 MMOL/L (ref 99–110)
CHOLESTEROL, TOTAL: 223 MG/DL (ref 0–199)
CO2: 25 MMOL/L (ref 21–32)
CREAT SERPL-MCNC: 0.5 MG/DL (ref 0.6–1.2)
GFR AFRICAN AMERICAN: >60
GFR NON-AFRICAN AMERICAN: >60
GLOBULIN: 3.5 G/DL
GLUCOSE BLD-MCNC: 155 MG/DL (ref 70–99)
HDLC SERPL-MCNC: 66 MG/DL (ref 40–60)
LDL CHOLESTEROL CALCULATED: 136 MG/DL
POTASSIUM SERPL-SCNC: 4.9 MMOL/L (ref 3.5–5.1)
SODIUM BLD-SCNC: 139 MMOL/L (ref 136–145)
TOTAL PROTEIN: 7.6 G/DL (ref 6.4–8.2)
TRIGL SERPL-MCNC: 107 MG/DL (ref 0–150)
VLDLC SERPL CALC-MCNC: 21 MG/DL

## 2019-02-21 PROCEDURE — 83036 HEMOGLOBIN GLYCOSYLATED A1C: CPT

## 2019-02-21 PROCEDURE — 80061 LIPID PANEL: CPT

## 2019-02-21 PROCEDURE — 80053 COMPREHEN METABOLIC PANEL: CPT

## 2019-02-21 PROCEDURE — 36415 COLL VENOUS BLD VENIPUNCTURE: CPT

## 2019-02-22 DIAGNOSIS — E11.9 TYPE 2 DIABETES MELLITUS WITHOUT COMPLICATION, WITHOUT LONG-TERM CURRENT USE OF INSULIN (HCC): ICD-10-CM

## 2019-02-22 LAB
ESTIMATED AVERAGE GLUCOSE: 171.4 MG/DL
HBA1C MFR BLD: 7.6 %

## 2019-02-22 RX ORDER — GLIMEPIRIDE 2 MG/1
TABLET ORAL
Qty: 135 TABLET | Refills: 0 | Status: SHIPPED | OUTPATIENT
Start: 2019-02-22 | End: 2019-05-22 | Stop reason: SDUPTHER

## 2019-03-07 ENCOUNTER — OFFICE VISIT (OUTPATIENT)
Dept: ORTHOPEDIC SURGERY | Age: 74
End: 2019-03-07
Payer: COMMERCIAL

## 2019-03-07 ENCOUNTER — TELEPHONE (OUTPATIENT)
Dept: ORTHOPEDIC SURGERY | Age: 74
End: 2019-03-07

## 2019-03-07 VITALS — WEIGHT: 242.51 LBS | BODY MASS INDEX: 38.06 KG/M2 | HEIGHT: 67 IN

## 2019-03-07 DIAGNOSIS — R20.0 HAND NUMBNESS: ICD-10-CM

## 2019-03-07 DIAGNOSIS — Z98.1 S/P CERVICAL SPINAL FUSION: Primary | ICD-10-CM

## 2019-03-07 PROCEDURE — 99213 OFFICE O/P EST LOW 20 MIN: CPT | Performed by: ORTHOPAEDIC SURGERY

## 2019-03-13 ENCOUNTER — HOSPITAL ENCOUNTER (OUTPATIENT)
Dept: CT IMAGING | Age: 74
Discharge: HOME | End: 2019-03-13
Payer: COMMERCIAL

## 2019-03-13 DIAGNOSIS — Z98.1 S/P CERVICAL SPINAL FUSION: ICD-10-CM

## 2019-03-13 DIAGNOSIS — R20.0 HAND NUMBNESS: ICD-10-CM

## 2019-03-13 PROCEDURE — 72125 CT NECK SPINE W/O DYE: CPT

## 2019-03-18 ENCOUNTER — OFFICE VISIT (OUTPATIENT)
Dept: RHEUMATOLOGY | Age: 74
End: 2019-03-18
Payer: COMMERCIAL

## 2019-03-18 VITALS
DIASTOLIC BLOOD PRESSURE: 83 MMHG | BODY MASS INDEX: 36.1 KG/M2 | WEIGHT: 230 LBS | HEIGHT: 67 IN | HEART RATE: 67 BPM | SYSTOLIC BLOOD PRESSURE: 160 MMHG

## 2019-03-18 DIAGNOSIS — G56.23 LESIONS OF BOTH ULNAR NERVES: ICD-10-CM

## 2019-03-18 DIAGNOSIS — Z79.899 HIGH RISK MEDICATION USE: ICD-10-CM

## 2019-03-18 DIAGNOSIS — M15.9 PRIMARY OSTEOARTHRITIS INVOLVING MULTIPLE JOINTS: ICD-10-CM

## 2019-03-18 DIAGNOSIS — M19.90 INFLAMMATORY ARTHRITIS: Primary | ICD-10-CM

## 2019-03-18 LAB
ALT SERPL-CCNC: 12 U/L (ref 10–40)
AST SERPL-CCNC: 11 U/L (ref 15–37)
BASOPHILS ABSOLUTE: 0.1 K/UL (ref 0–0.2)
BASOPHILS RELATIVE PERCENT: 0.8 %
CREAT SERPL-MCNC: 0.6 MG/DL (ref 0.6–1.2)
EOSINOPHILS ABSOLUTE: 0.3 K/UL (ref 0–0.6)
EOSINOPHILS RELATIVE PERCENT: 2.7 %
GFR AFRICAN AMERICAN: >60
GFR NON-AFRICAN AMERICAN: >60
HCT VFR BLD CALC: 41 % (ref 36–48)
HEMOGLOBIN: 13.4 G/DL (ref 12–16)
LYMPHOCYTES ABSOLUTE: 1.6 K/UL (ref 1–5.1)
LYMPHOCYTES RELATIVE PERCENT: 17.3 %
MCH RBC QN AUTO: 30.9 PG (ref 26–34)
MCHC RBC AUTO-ENTMCNC: 32.8 G/DL (ref 31–36)
MCV RBC AUTO: 94.1 FL (ref 80–100)
MONOCYTES ABSOLUTE: 0.8 K/UL (ref 0–1.3)
MONOCYTES RELATIVE PERCENT: 8.9 %
NEUTROPHILS ABSOLUTE: 6.5 K/UL (ref 1.7–7.7)
NEUTROPHILS RELATIVE PERCENT: 70.3 %
PDW BLD-RTO: 14.9 % (ref 12.4–15.4)
PLATELET # BLD: 354 K/UL (ref 135–450)
PMV BLD AUTO: 9.2 FL (ref 5–10.5)
RBC # BLD: 4.36 M/UL (ref 4–5.2)
WBC # BLD: 9.3 K/UL (ref 4–11)

## 2019-03-18 PROCEDURE — 99214 OFFICE O/P EST MOD 30 MIN: CPT | Performed by: INTERNAL MEDICINE

## 2019-03-18 RX ORDER — DICLOFENAC SODIUM 75 MG/1
75 TABLET, DELAYED RELEASE ORAL 2 TIMES DAILY
Qty: 60 TABLET | Refills: 2 | Status: SHIPPED | OUTPATIENT
Start: 2019-03-18 | End: 2019-04-26 | Stop reason: SDUPTHER

## 2019-03-19 ENCOUNTER — TELEPHONE (OUTPATIENT)
Dept: ORTHOPEDIC SURGERY | Age: 74
End: 2019-03-19

## 2019-03-19 ENCOUNTER — NURSE ONLY (OUTPATIENT)
Dept: FAMILY MEDICINE CLINIC | Age: 74
End: 2019-03-19
Payer: COMMERCIAL

## 2019-03-19 DIAGNOSIS — E53.8 LOW VITAMIN B12 LEVEL: ICD-10-CM

## 2019-03-19 DIAGNOSIS — D50.9 IRON DEFICIENCY ANEMIA, UNSPECIFIED IRON DEFICIENCY ANEMIA TYPE: Primary | ICD-10-CM

## 2019-03-19 PROCEDURE — 96372 THER/PROPH/DIAG INJ SC/IM: CPT | Performed by: FAMILY MEDICINE

## 2019-03-19 RX ORDER — CYANOCOBALAMIN 1000 UG/ML
1000 INJECTION INTRAMUSCULAR; SUBCUTANEOUS ONCE
Status: COMPLETED | OUTPATIENT
Start: 2019-03-19 | End: 2019-03-19

## 2019-03-19 RX ADMIN — CYANOCOBALAMIN 1000 MCG: 1000 INJECTION INTRAMUSCULAR; SUBCUTANEOUS at 09:40

## 2019-03-22 ENCOUNTER — TELEPHONE (OUTPATIENT)
Dept: ORTHOPEDIC SURGERY | Age: 74
End: 2019-03-22

## 2019-03-26 DIAGNOSIS — M19.90 INFLAMMATORY ARTHRITIS: ICD-10-CM

## 2019-03-26 RX ORDER — FOLIC ACID 1 MG/1
TABLET ORAL
Qty: 30 TABLET | Refills: 5 | Status: SHIPPED | OUTPATIENT
Start: 2019-03-26 | End: 2019-09-11 | Stop reason: SDUPTHER

## 2019-04-02 ENCOUNTER — OFFICE VISIT (OUTPATIENT)
Dept: ORTHOPEDIC SURGERY | Age: 74
End: 2019-04-02
Payer: COMMERCIAL

## 2019-04-02 VITALS
HEART RATE: 79 BPM | SYSTOLIC BLOOD PRESSURE: 135 MMHG | HEIGHT: 67 IN | DIASTOLIC BLOOD PRESSURE: 82 MMHG | WEIGHT: 240 LBS | BODY MASS INDEX: 37.67 KG/M2

## 2019-04-02 DIAGNOSIS — M47.22 OSTEOARTHRITIS OF SPINE WITH RADICULOPATHY, CERVICAL REGION: Primary | ICD-10-CM

## 2019-04-02 PROCEDURE — 99213 OFFICE O/P EST LOW 20 MIN: CPT | Performed by: ORTHOPAEDIC SURGERY

## 2019-04-18 ENCOUNTER — NURSE ONLY (OUTPATIENT)
Dept: FAMILY MEDICINE CLINIC | Age: 74
End: 2019-04-18
Payer: COMMERCIAL

## 2019-04-18 DIAGNOSIS — Z79.899 HIGH RISK MEDICATION USE: ICD-10-CM

## 2019-04-18 DIAGNOSIS — E53.8 LOW VITAMIN B12 LEVEL: Primary | ICD-10-CM

## 2019-04-18 LAB
BASOPHILS ABSOLUTE: 0 K/UL (ref 0–0.2)
BASOPHILS RELATIVE PERCENT: 0.4 %
CREAT SERPL-MCNC: <0.5 MG/DL (ref 0.6–1.2)
EOSINOPHILS ABSOLUTE: 0.2 K/UL (ref 0–0.6)
EOSINOPHILS RELATIVE PERCENT: 2.9 %
GFR AFRICAN AMERICAN: >60
GFR NON-AFRICAN AMERICAN: >60
HCT VFR BLD CALC: 40.6 % (ref 36–48)
HEMOGLOBIN: 13.3 G/DL (ref 12–16)
LYMPHOCYTES ABSOLUTE: 1.4 K/UL (ref 1–5.1)
LYMPHOCYTES RELATIVE PERCENT: 17 %
MCH RBC QN AUTO: 31.2 PG (ref 26–34)
MCHC RBC AUTO-ENTMCNC: 32.7 G/DL (ref 31–36)
MCV RBC AUTO: 95.4 FL (ref 80–100)
MONOCYTES ABSOLUTE: 0.8 K/UL (ref 0–1.3)
MONOCYTES RELATIVE PERCENT: 9.7 %
NEUTROPHILS ABSOLUTE: 5.6 K/UL (ref 1.7–7.7)
NEUTROPHILS RELATIVE PERCENT: 70 %
PDW BLD-RTO: 15.1 % (ref 12.4–15.4)
PLATELET # BLD: 320 K/UL (ref 135–450)
PMV BLD AUTO: 9.5 FL (ref 5–10.5)
RBC # BLD: 4.25 M/UL (ref 4–5.2)
WBC # BLD: 8 K/UL (ref 4–11)

## 2019-04-18 PROCEDURE — 96372 THER/PROPH/DIAG INJ SC/IM: CPT | Performed by: FAMILY MEDICINE

## 2019-04-18 RX ORDER — CYANOCOBALAMIN 1000 UG/ML
1000 INJECTION INTRAMUSCULAR; SUBCUTANEOUS ONCE
Status: COMPLETED | OUTPATIENT
Start: 2019-04-18 | End: 2019-04-18

## 2019-04-18 RX ADMIN — CYANOCOBALAMIN 1000 MCG: 1000 INJECTION INTRAMUSCULAR; SUBCUTANEOUS at 10:13

## 2019-04-26 DIAGNOSIS — M15.9 PRIMARY OSTEOARTHRITIS INVOLVING MULTIPLE JOINTS: ICD-10-CM

## 2019-04-26 RX ORDER — DICLOFENAC SODIUM 75 MG/1
75 TABLET, DELAYED RELEASE ORAL 2 TIMES DAILY
Qty: 60 TABLET | Refills: 2 | Status: SHIPPED | OUTPATIENT
Start: 2019-04-26 | End: 2019-08-26 | Stop reason: SDUPTHER

## 2019-05-16 ENCOUNTER — TELEPHONE (OUTPATIENT)
Dept: FAMILY MEDICINE CLINIC | Age: 74
End: 2019-05-16

## 2019-05-16 DIAGNOSIS — E11.9 TYPE 2 DIABETES MELLITUS WITHOUT COMPLICATION, WITHOUT LONG-TERM CURRENT USE OF INSULIN (HCC): ICD-10-CM

## 2019-05-16 DIAGNOSIS — E78.5 HYPERLIPIDEMIA, UNSPECIFIED HYPERLIPIDEMIA TYPE: ICD-10-CM

## 2019-05-16 DIAGNOSIS — D50.9 IRON DEFICIENCY ANEMIA, UNSPECIFIED IRON DEFICIENCY ANEMIA TYPE: Primary | ICD-10-CM

## 2019-05-16 NOTE — TELEPHONE ENCOUNTER
Patient is seeing you next week and would like to have labs drawn prior to being seen. Can we order labs so you can have results when she comes in for visit . She would like her A1C checked and whatever other routine labs she would need done . She would like to go to [facility] tomorrow morning if possible . Please call patient once orders are complete .

## 2019-05-17 ENCOUNTER — HOSPITAL ENCOUNTER (OUTPATIENT)
Age: 74
Discharge: HOME | End: 2019-05-17
Payer: COMMERCIAL

## 2019-05-17 DIAGNOSIS — E78.5 HYPERLIPIDEMIA, UNSPECIFIED HYPERLIPIDEMIA TYPE: ICD-10-CM

## 2019-05-17 LAB
A/G RATIO: 1.2 (ref 1.1–2.2)
ALBUMIN SERPL-MCNC: 3.9 G/DL (ref 3.4–5)
ALP BLD-CCNC: 137 U/L (ref 40–129)
ALT SERPL-CCNC: 16 U/L (ref 10–40)
ANION GAP SERPL CALCULATED.3IONS-SCNC: 14 MMOL/L (ref 3–16)
AST SERPL-CCNC: 16 U/L (ref 15–37)
BASOPHILS ABSOLUTE: 0 K/UL (ref 0–0.2)
BASOPHILS RELATIVE PERCENT: 0.6 %
BILIRUB SERPL-MCNC: 0.4 MG/DL (ref 0–1)
BUN BLDV-MCNC: 16 MG/DL (ref 7–20)
CALCIUM SERPL-MCNC: 9.6 MG/DL (ref 8.3–10.6)
CHLORIDE BLD-SCNC: 100 MMOL/L (ref 99–110)
CHOLESTEROL, FASTING: 205 MG/DL (ref 0–199)
CO2: 24 MMOL/L (ref 21–32)
CREAT SERPL-MCNC: 0.6 MG/DL (ref 0.6–1.2)
EOSINOPHILS ABSOLUTE: 0.3 K/UL (ref 0–0.6)
EOSINOPHILS RELATIVE PERCENT: 3.8 %
ESTIMATED AVERAGE GLUCOSE: 159.9 MG/DL
GFR AFRICAN AMERICAN: >60
GFR NON-AFRICAN AMERICAN: >60
GLOBULIN: 3.3 G/DL
GLUCOSE BLD-MCNC: 114 MG/DL (ref 70–99)
HBA1C MFR BLD: 7.2 %
HCT VFR BLD CALC: 39.9 % (ref 36–48)
HDLC SERPL-MCNC: 70 MG/DL (ref 40–60)
HEMOGLOBIN: 13.4 G/DL (ref 12–16)
LDL CHOLESTEROL CALCULATED: 119 MG/DL
LYMPHOCYTES ABSOLUTE: 1.6 K/UL (ref 1–5.1)
LYMPHOCYTES RELATIVE PERCENT: 20.2 %
MCH RBC QN AUTO: 32.1 PG (ref 26–34)
MCHC RBC AUTO-ENTMCNC: 33.7 G/DL (ref 31–36)
MCV RBC AUTO: 95.4 FL (ref 80–100)
MONOCYTES ABSOLUTE: 0.7 K/UL (ref 0–1.3)
MONOCYTES RELATIVE PERCENT: 8.4 %
NEUTROPHILS ABSOLUTE: 5.2 K/UL (ref 1.7–7.7)
NEUTROPHILS RELATIVE PERCENT: 67 %
PDW BLD-RTO: 15.3 % (ref 12.4–15.4)
PLATELET # BLD: 318 K/UL (ref 135–450)
PMV BLD AUTO: 9.1 FL (ref 5–10.5)
POTASSIUM SERPL-SCNC: 4.3 MMOL/L (ref 3.5–5.1)
RBC # BLD: 4.18 M/UL (ref 4–5.2)
SODIUM BLD-SCNC: 138 MMOL/L (ref 136–145)
TOTAL PROTEIN: 7.2 G/DL (ref 6.4–8.2)
TRIGLYCERIDE, FASTING: 80 MG/DL (ref 0–150)
VLDLC SERPL CALC-MCNC: 16 MG/DL
WBC # BLD: 7.8 K/UL (ref 4–11)

## 2019-05-17 PROCEDURE — 83036 HEMOGLOBIN GLYCOSYLATED A1C: CPT

## 2019-05-17 PROCEDURE — 80053 COMPREHEN METABOLIC PANEL: CPT

## 2019-05-17 PROCEDURE — 85025 COMPLETE CBC W/AUTO DIFF WBC: CPT

## 2019-05-17 PROCEDURE — 80061 LIPID PANEL: CPT

## 2019-05-17 PROCEDURE — 36415 COLL VENOUS BLD VENIPUNCTURE: CPT

## 2019-05-22 ENCOUNTER — OFFICE VISIT (OUTPATIENT)
Dept: FAMILY MEDICINE CLINIC | Age: 74
End: 2019-05-22
Payer: COMMERCIAL

## 2019-05-22 VITALS
WEIGHT: 240 LBS | HEART RATE: 71 BPM | SYSTOLIC BLOOD PRESSURE: 120 MMHG | OXYGEN SATURATION: 95 % | DIASTOLIC BLOOD PRESSURE: 80 MMHG | BODY MASS INDEX: 37.59 KG/M2

## 2019-05-22 DIAGNOSIS — F41.1 ANXIETY STATE: ICD-10-CM

## 2019-05-22 DIAGNOSIS — I10 ESSENTIAL HYPERTENSION: Chronic | ICD-10-CM

## 2019-05-22 DIAGNOSIS — Z79.899 HIGH RISK MEDICATION USE: ICD-10-CM

## 2019-05-22 DIAGNOSIS — E78.5 HYPERLIPIDEMIA, UNSPECIFIED HYPERLIPIDEMIA TYPE: Chronic | ICD-10-CM

## 2019-05-22 DIAGNOSIS — E11.9 TYPE 2 DIABETES MELLITUS WITHOUT COMPLICATION, WITHOUT LONG-TERM CURRENT USE OF INSULIN (HCC): ICD-10-CM

## 2019-05-22 DIAGNOSIS — E53.8 LOW VITAMIN B12 LEVEL: Primary | ICD-10-CM

## 2019-05-22 LAB
ALT SERPL-CCNC: 14 U/L (ref 10–40)
AST SERPL-CCNC: 13 U/L (ref 15–37)
BASOPHILS ABSOLUTE: 0 K/UL (ref 0–0.2)
BASOPHILS RELATIVE PERCENT: 0.5 %
CREAT SERPL-MCNC: 0.6 MG/DL (ref 0.6–1.2)
EOSINOPHILS ABSOLUTE: 0.2 K/UL (ref 0–0.6)
EOSINOPHILS RELATIVE PERCENT: 2.9 %
GFR AFRICAN AMERICAN: >60
GFR NON-AFRICAN AMERICAN: >60
HCT VFR BLD CALC: 40.1 % (ref 36–48)
HEMOGLOBIN: 13.4 G/DL (ref 12–16)
LYMPHOCYTES ABSOLUTE: 1.2 K/UL (ref 1–5.1)
LYMPHOCYTES RELATIVE PERCENT: 14.9 %
MCH RBC QN AUTO: 32.3 PG (ref 26–34)
MCHC RBC AUTO-ENTMCNC: 33.4 G/DL (ref 31–36)
MCV RBC AUTO: 96.5 FL (ref 80–100)
MONOCYTES ABSOLUTE: 0.7 K/UL (ref 0–1.3)
MONOCYTES RELATIVE PERCENT: 8.6 %
NEUTROPHILS ABSOLUTE: 6 K/UL (ref 1.7–7.7)
NEUTROPHILS RELATIVE PERCENT: 73.1 %
PDW BLD-RTO: 15.7 % (ref 12.4–15.4)
PLATELET # BLD: 313 K/UL (ref 135–450)
PMV BLD AUTO: 9.4 FL (ref 5–10.5)
RBC # BLD: 4.16 M/UL (ref 4–5.2)
WBC # BLD: 8.2 K/UL (ref 4–11)

## 2019-05-22 PROCEDURE — 96372 THER/PROPH/DIAG INJ SC/IM: CPT | Performed by: FAMILY MEDICINE

## 2019-05-22 PROCEDURE — 99214 OFFICE O/P EST MOD 30 MIN: CPT | Performed by: FAMILY MEDICINE

## 2019-05-22 PROCEDURE — 3288F FALL RISK ASSESSMENT DOCD: CPT | Performed by: FAMILY MEDICINE

## 2019-05-22 PROCEDURE — G8510 SCR DEP NEG, NO PLAN REQD: HCPCS | Performed by: FAMILY MEDICINE

## 2019-05-22 RX ORDER — FLUOXETINE HYDROCHLORIDE 20 MG/1
20 CAPSULE ORAL DAILY
COMMUNITY
End: 2019-05-22 | Stop reason: SDUPTHER

## 2019-05-22 RX ORDER — FLUOXETINE HYDROCHLORIDE 20 MG/1
20 CAPSULE ORAL DAILY
Qty: 90 CAPSULE | Refills: 1 | Status: SHIPPED | OUTPATIENT
Start: 2019-05-22 | End: 2019-09-23 | Stop reason: SDUPTHER

## 2019-05-22 RX ORDER — ATENOLOL 50 MG/1
TABLET ORAL
Qty: 90 TABLET | Refills: 1 | Status: SHIPPED | OUTPATIENT
Start: 2019-05-22 | End: 2019-09-23 | Stop reason: SDUPTHER

## 2019-05-22 RX ORDER — CYANOCOBALAMIN 1000 UG/ML
1000 INJECTION INTRAMUSCULAR; SUBCUTANEOUS ONCE
Status: COMPLETED | OUTPATIENT
Start: 2019-05-22 | End: 2019-05-22

## 2019-05-22 RX ORDER — PRAVASTATIN SODIUM 40 MG
40 TABLET ORAL DAILY
Qty: 90 TABLET | Refills: 1 | Status: SHIPPED | OUTPATIENT
Start: 2019-05-22 | End: 2020-01-13 | Stop reason: SDUPTHER

## 2019-05-22 RX ORDER — GLIMEPIRIDE 2 MG/1
TABLET ORAL
Qty: 135 TABLET | Refills: 1 | Status: SHIPPED | OUTPATIENT
Start: 2019-05-22 | End: 2019-11-16 | Stop reason: SDUPTHER

## 2019-05-22 RX ORDER — METFORMIN HYDROCHLORIDE 500 MG/1
500 TABLET, EXTENDED RELEASE ORAL
Qty: 90 TABLET | Refills: 1 | Status: SHIPPED | OUTPATIENT
Start: 2019-05-22 | End: 2019-05-24

## 2019-05-22 RX ADMIN — CYANOCOBALAMIN 1000 MCG: 1000 INJECTION INTRAMUSCULAR; SUBCUTANEOUS at 12:26

## 2019-05-22 ASSESSMENT — ENCOUNTER SYMPTOMS
SHORTNESS OF BREATH: 0
CONSTIPATION: 0
ABDOMINAL PAIN: 1
CHEST TIGHTNESS: 0
COUGH: 0
BACK PAIN: 1
DIARRHEA: 0

## 2019-05-22 ASSESSMENT — PATIENT HEALTH QUESTIONNAIRE - PHQ9
1. LITTLE INTEREST OR PLEASURE IN DOING THINGS: 0
SUM OF ALL RESPONSES TO PHQ QUESTIONS 1-9: 0
SUM OF ALL RESPONSES TO PHQ9 QUESTIONS 1 & 2: 0
2. FEELING DOWN, DEPRESSED OR HOPELESS: 0
SUM OF ALL RESPONSES TO PHQ QUESTIONS 1-9: 0

## 2019-05-22 ASSESSMENT — COPD QUESTIONNAIRES: COPD: 1

## 2019-05-22 NOTE — PROGRESS NOTES
SUBJECTIVE:    Cornelius Fagan is a 68 y.o. female who presents for a follow up visit. Chief Complaint   Patient presents with    Follow-up     Patient is here for a follow up. Discuss labs        COPD   There is no chest tightness, cough or shortness of breath. This is a chronic problem. The current episode started more than 1 year ago. The problem occurs rarely. The problem has been unchanged. Pertinent negatives include no chest pain or headaches. Her symptoms are aggravated by exercise and change in weather. Relieved by: Anoro Ellipta. She reports complete improvement on treatment. Hyperlipidemia   This is a chronic problem. The current episode started more than 1 year ago. The problem is controlled. Lipid results: Total cholesterol 205, triglycerides 80, HDL 70, . Factors aggravating her hyperlipidemia include beta blockers and fatty foods. Pertinent negatives include no chest pain or shortness of breath. Current antihyperlipidemic treatment includes statins. The current treatment provides significant improvement of lipids. Compliance problems include adherence to exercise and adherence to diet. Risk factors for coronary artery disease include a sedentary lifestyle, hypertension, dyslipidemia and diabetes mellitus. Hypertension   This is a chronic problem. The current episode started more than 1 year ago. The problem is controlled. Associated symptoms include peripheral edema. Pertinent negatives include no chest pain, headaches, palpitations or shortness of breath. Risk factors for coronary artery disease include dyslipidemia, diabetes mellitus and sedentary lifestyle. Past treatments include beta blockers and angiotensin blockers. The current treatment provides significant improvement. Compliance problems include exercise (Able to do water aerobics. Difficulty with walking.). Diabetes   She presents for her follow-up diabetic visit. She has type 2 diabetes mellitus.  No MedicAlert identification noted. Her disease course has been stable. Hypoglycemia symptoms include nervousness/anxiousness. Pertinent negatives for hypoglycemia include no dizziness or headaches. Pertinent negatives for diabetes include no chest pain, no fatigue and no foot paresthesias. There are no hypoglycemic complications. Diabetic symptom progression: Hemoglobin A1c 7.2. Risk factors for coronary artery disease include dyslipidemia, diabetes mellitus, hypertension, obesity and sedentary lifestyle. Current diabetic treatment includes oral agent (monotherapy). She is compliant with treatment all of the time. Her weight is stable. When asked about meal planning, she reported none. She participates in exercise three times a week. There is no change in her home blood glucose trend. An ACE inhibitor/angiotensin II receptor blocker is being taken. Anxiety  This is a chronic problem. Present for years. She feels that symptoms are in good control on her Prozac. She would like to continue the Prozac. Patient's medications, allergies, past medical,surgical, social and family histories were reviewed and updated as appropriate.      Past Medical History:   Diagnosis Date    Anxiety     BCC (basal cell carcinoma of skin)     NBCC     COPD (chronic obstructive pulmonary disease) (HCC)     Hyperlipidemia     Hypertension     Obesity     Osteoarthritis     Type 2 diabetes mellitus ([facility] [address] 2/27/2015    Wears partial dentures      Past Surgical History:   Procedure Laterality Date    CARDIAC CATHETERIZATION      CARPAL TUNNEL RELEASE Right 10/19/2017    CARPAL TUNNEL RELEASE Left 11/02/2017     Left  Carpal Tunnel Release & Left Ulnar Nerve decompression at the Cubital Tunnel     CERVICAL LAMINECTOMY  04/18/2018    POSTERIOR CERVICAL LAMINECTOMY AND POSTERIOR FUSION C3-C7 WITH MEDTRONIC,EVOKES AND O-ARM    COLONOSCOPY      COLONOSCOPY N/A 12/6/2018    COLONOSCOPY performed by Vane Gonzales MD at [address] [place] [address]  DILATION AND CURETTAGE OF UTERUS N/A 05/08/2015    DILATATION AND CURETTAGE, HYSTEROSCOPY WITH MYOSURE    KNEE ARTHROPLASTY Right 11/03/2015    RIGHT TOTAL KNEE ARTHROPLASTY             SPINE SURGERY  02/07/2013    TONSILLECTOMY      TOTAL KNEE ARTHROPLASTY Left [id number]    ULNAR TUNNEL RELEASE Right 10/19/2017     Family History   Problem Relation Age of Onset    Heart Disease Mother         pacemaker    Cancer Mother         colon    COPD Father     Heart Disease Father     Colon Cancer Maternal Grandmother     Heart Disease Maternal Grandfather     Stroke Maternal Grandfather      Social History     Socioeconomic History    Marital status:      Spouse name: Not on file    Number of children: Not on file    Years of education: Not on file    Highest education level: Not on file   Occupational History    Occupation: retired   Social Needs    Financial resource strain: Not on file    Food insecurity:     Worry: Not on file     Inability: Not on file   [email or web address] needs:     Medical: Not on file     Non-medical: Not on file   Tobacco Use    Smoking status: Never Smoker    Smokeless tobacco: Never Used   Substance and Sexual Activity    Alcohol use:  Yes     Alcohol/week: 0.0 oz     Comment: soc    Drug use: No    Sexual activity: Yes     Partners: Male   Lifestyle    Physical activity:     Days per week: Not on file     Minutes per session: Not on file    Stress: Not on file   Relationships    Social connections:     Talks on phone: Not on file     Gets together: Not on file     Attends Oriental orthodox service: Not on file     Active member of club or organization: Not on file     Attends meetings of clubs or organizations: Not on file     Relationship status: Not on file    Intimate partner violence:     Fear of current or ex partner: Not on file     Emotionally abused: Not on file     Physically abused: Not on file     Forced sexual activity: Not on file   Other Topics Concern  Not on file   Social History Narrative    Not on file     Allergies   Allergen Reactions    Ace Inhibitors      cough    Actifed Cold-Allergy [Chlorpheniramine-Phenylephrine]     Cephalexin Other (See Comments)     Yeast infection if taken over a long period of time    Codeine Other (See Comments)     dizziness    Erythromycin Base      Patient denies on 2/28/2013    Lisinopril Other (See Comments)     Cough     Lovastatin Other (See Comments)     Myalgias; mouth sores; ELEV LFT    Metformin And Related      Diarrhea (time release is better but insurance doesn't cover), leg cramps, fatigue.  Sudafed [Pseudoephedrine Hcl] Other (See Comments)     Shakes     Trinalin [Azatadine-Pseudoephedrine]      Current Outpatient Medications   Medication Sig Dispense Refill    atenolol (TENORMIN) 50 MG tablet TAKE 1 TABLET DAILY 90 tablet 1    glimepiride (AMARYL) 2 MG tablet Take 1 1/2 tablets  BY MOUTH EVERY MORNING 135 tablet 1    pravastatin (PRAVACHOL) 40 MG tablet Take 1 tablet by mouth daily 90 tablet 1    FLUoxetine (PROZAC) 20 MG capsule Take 1 capsule by mouth daily 90 capsule 1    metFORMIN (GLUCOPHAGE-XR) 500 MG extended release tablet Take 1 tablet by mouth daily (with breakfast) 90 tablet 1    diclofenac (VOLTAREN) 75 MG EC tablet Take 1 tablet by mouth 2 times daily 60 tablet 2    folic acid (FOLVITE) 1 MG tablet TAKE 1 TABLET BY MOUTH EVERY DAY 30 tablet 5    methotrexate (RHEUMATREX) 2.5 MG chemo tablet Take 8  tabs once a week. Take all tabs at the same time 40 tablet 1    hydroxychloroquine (PLAQUENIL) 200 MG tablet TAKE 1 TABLET BY MOUTH TWICE A  tablet 1    losartan (COZAAR) 50 MG tablet TAKE 1 TABLET DAILY 90 tablet 0    umeclidinium-vilanterol (ANORO ELLIPTA) 62.5-25 MCG/INH AEPB inhaler Inhale 1 puff into the lungs daily 3 each 3    glucose blood VI test strips (ONE TOUCH TEST STRIPS) strip 1 each by In Vitro route daily.  Check FBS and 4 pm  each 3    ONE TOUCH LANCETS MISC Check FBS and 4 pm  each 3    Blood Glucose Monitoring Suppl (ONE TOUCH ULTRA 2) W/DEVICE KIT 1 kit by Does not apply route daily as needed. 1 kit 0     No current facility-administered medications for this visit. Review of Systems   Constitutional: Negative for activity change, fatigue and unexpected weight change. HENT: Negative. Respiratory: Negative for cough and shortness of breath. Cardiovascular: Negative for chest pain, palpitations and leg swelling. Gastrointestinal: Positive for abdominal pain. Negative for constipation and diarrhea. Genitourinary: Negative for difficulty urinating. Musculoskeletal: Positive for arthralgias and back pain. Neurological: Positive for numbness (both hands L>R). Negative for dizziness and headaches. Psychiatric/Behavioral: Negative for dysphoric mood. The patient is nervous/anxious. OBJECTIVE:    /80   Pulse 71   Wt 240 lb (108.9 kg)   LMP 03/01/1998   SpO2 95%   BMI 37.59 kg/m²    Physical Exam   Constitutional: She appears well-developed and well-nourished. She is cooperative. Neck: Carotid bruit is not present. Cardiovascular: Normal rate, regular rhythm and normal heart sounds. No murmur heard. Pulses:       Dorsalis pedis pulses are 2+ on the right side, and 2+ on the left side. Posterior tibial pulses are 2+ on the right side, and 2+ on the left side. Pulmonary/Chest: Effort normal and breath sounds normal.   Musculoskeletal: Normal range of motion. She exhibits no edema. Right foot: Normal.        Left foot: Normal.   Neurological: She is alert. She has normal reflexes. No sensory deficit. 12 point monofilament test normal        ASSESSMENT/PLAN:    [facility] was seen today for follow-up. Diagnoses and all orders for this visit:    Low vitamin B12 level  -     cyanocobalamin injection 1,000 mcg  -     Vitamin B12 & Folate;  Future    Essential hypertension  -     atenolol (TENORMIN)

## 2019-05-22 NOTE — PROGRESS NOTES
Medication given during visit:    Administrations This Visit     cyanocobalamin injection 1,000 mcg     Admin Date  05/22/2019  12:26 Action  Given Dose  1000 mcg Route  Intramuscular Site  Deltoid Left Administered By  Rivka Castillo MA    Ordering Provider:  Meng Yuan MD    NDC:  64949-443-16    Lot#:  [id number]    :  [employer] [facility]    Patient Supplied?:  No    Comments:  exp: 12/2020                Patient instructed to remain in clinic for 20 minutes after injection and was advised to report any adverse reaction to me immediately.

## 2019-06-11 RX ORDER — LOSARTAN POTASSIUM 50 MG/1
TABLET ORAL
Qty: 90 TABLET | Refills: 1 | Status: SHIPPED | OUTPATIENT
Start: 2019-06-11 | End: 2019-06-18 | Stop reason: SDUPTHER

## 2019-06-12 ENCOUNTER — OFFICE VISIT (OUTPATIENT)
Dept: DERMATOLOGY | Age: 74
End: 2019-06-12
Payer: COMMERCIAL

## 2019-06-12 DIAGNOSIS — Z85.828 HISTORY OF BASAL CELL CARCINOMA: ICD-10-CM

## 2019-06-12 DIAGNOSIS — L21.9 SEBORRHEIC DERMATITIS: Primary | ICD-10-CM

## 2019-06-12 DIAGNOSIS — L57.0 AK (ACTINIC KERATOSIS): ICD-10-CM

## 2019-06-12 PROCEDURE — 17000 DESTRUCT PREMALG LESION: CPT | Performed by: DERMATOLOGY

## 2019-06-12 PROCEDURE — 99213 OFFICE O/P EST LOW 20 MIN: CPT | Performed by: DERMATOLOGY

## 2019-06-12 NOTE — PROGRESS NOTES
 TOTAL KNEE ARTHROPLASTY Left [id number]    ULNAR TUNNEL RELEASE Right 10/19/2017       Allergies   Allergen Reactions    Ace Inhibitors      cough    Actifed Cold-Allergy [Chlorpheniramine-Phenylephrine]     Cephalexin Other (See Comments)     Yeast infection if taken over a long period of time    Codeine Other (See Comments)     dizziness    Erythromycin Base      Patient denies on 2/28/2013    Lisinopril Other (See Comments)     Cough     Lovastatin Other (See Comments)     Myalgias; mouth sores; ELEV LFT    Metformin And Related      Diarrhea (time release is better but insurance doesn't cover), leg cramps, fatigue.  Sudafed [Pseudoephedrine Hcl] Other (See Comments)     Shakes     Trinalin [Azatadine-Pseudoephedrine]      Outpatient Medications Marked as Taking for the 6/12/19 encounter (Office Visit) with Stan Albrecht MD   Medication Sig Dispense Refill    hydrocortisone 2.5 % cream Apply to red scaly areas on the skin of the face twice daily for up to 2 weeks or until improved.  30 g 2    losartan (COZAAR) 50 MG tablet TAKE 1 TABLET DAILY 90 tablet 1    methotrexate (RHEUMATREX) 2.5 MG chemo tablet TAKE 8 TABLETS BY MOUTH ONCE WEEKLY TAKE ALL TABLETS AT THE SAME TIME 40 tablet 2    metFORMIN (GLUCOPHAGE) 500 MG tablet Take 1 tablet by mouth daily (with breakfast) 90 tablet 3    atenolol (TENORMIN) 50 MG tablet TAKE 1 TABLET DAILY 90 tablet 1    glimepiride (AMARYL) 2 MG tablet Take 1 1/2 tablets  BY MOUTH EVERY MORNING 135 tablet 1    pravastatin (PRAVACHOL) 40 MG tablet Take 1 tablet by mouth daily 90 tablet 1    FLUoxetine (PROZAC) 20 MG capsule Take 1 capsule by mouth daily 90 capsule 1    diclofenac (VOLTAREN) 75 MG EC tablet Take 1 tablet by mouth 2 times daily 60 tablet 2    folic acid (FOLVITE) 1 MG tablet TAKE 1 TABLET BY MOUTH EVERY DAY 30 tablet 5    hydroxychloroquine (PLAQUENIL) 200 MG tablet TAKE 1 TABLET BY MOUTH TWICE A  tablet 1    umeclidinium-vilanterol

## 2019-06-17 ENCOUNTER — OFFICE VISIT (OUTPATIENT)
Dept: RHEUMATOLOGY | Age: 74
End: 2019-06-17
Payer: COMMERCIAL

## 2019-06-17 VITALS
HEART RATE: 71 BPM | SYSTOLIC BLOOD PRESSURE: 148 MMHG | BODY MASS INDEX: 37.04 KG/M2 | DIASTOLIC BLOOD PRESSURE: 86 MMHG | HEIGHT: 67 IN | WEIGHT: 236 LBS

## 2019-06-17 DIAGNOSIS — M19.90 INFLAMMATORY ARTHRITIS: Primary | ICD-10-CM

## 2019-06-17 DIAGNOSIS — M15.9 PRIMARY OSTEOARTHRITIS INVOLVING MULTIPLE JOINTS: ICD-10-CM

## 2019-06-17 DIAGNOSIS — Z79.899 HIGH RISK MEDICATION USE: ICD-10-CM

## 2019-06-17 DIAGNOSIS — M19.90 INFLAMMATORY ARTHRITIS: ICD-10-CM

## 2019-06-17 DIAGNOSIS — G56.23 LESIONS OF BOTH ULNAR NERVES: ICD-10-CM

## 2019-06-17 LAB
ALT SERPL-CCNC: 12 U/L (ref 10–40)
AST SERPL-CCNC: 11 U/L (ref 15–37)
BASOPHILS ABSOLUTE: 0.1 K/UL (ref 0–0.2)
BASOPHILS RELATIVE PERCENT: 0.9 %
C-REACTIVE PROTEIN: 20.2 MG/L (ref 0–5.1)
CREAT SERPL-MCNC: <0.5 MG/DL (ref 0.6–1.2)
EOSINOPHILS ABSOLUTE: 0.2 K/UL (ref 0–0.6)
EOSINOPHILS RELATIVE PERCENT: 3 %
GFR AFRICAN AMERICAN: >60
GFR NON-AFRICAN AMERICAN: >60
HCT VFR BLD CALC: 40.3 % (ref 36–48)
HEMOGLOBIN: 13 G/DL (ref 12–16)
LYMPHOCYTES ABSOLUTE: 1.4 K/UL (ref 1–5.1)
LYMPHOCYTES RELATIVE PERCENT: 17.3 %
MCH RBC QN AUTO: 30.9 PG (ref 26–34)
MCHC RBC AUTO-ENTMCNC: 32.3 G/DL (ref 31–36)
MCV RBC AUTO: 95.8 FL (ref 80–100)
MONOCYTES ABSOLUTE: 0.5 K/UL (ref 0–1.3)
MONOCYTES RELATIVE PERCENT: 6.4 %
NEUTROPHILS ABSOLUTE: 6 K/UL (ref 1.7–7.7)
NEUTROPHILS RELATIVE PERCENT: 72.4 %
PDW BLD-RTO: 15.8 % (ref 12.4–15.4)
PLATELET # BLD: 326 K/UL (ref 135–450)
PMV BLD AUTO: 10 FL (ref 5–10.5)
RBC # BLD: 4.21 M/UL (ref 4–5.2)
WBC # BLD: 8.3 K/UL (ref 4–11)

## 2019-06-17 PROCEDURE — 99214 OFFICE O/P EST MOD 30 MIN: CPT | Performed by: INTERNAL MEDICINE

## 2019-06-17 NOTE — PROGRESS NOTES
2019  Patient Name: Denise Granados  : 1945  Medical Record: [id number]    MEDICATIONS  Current Outpatient Medications   Medication Sig Dispense Refill    hydrocortisone 2.5 % cream Apply to red scaly areas on the skin of the face twice daily for up to 2 weeks or until improved. 30 g 2    losartan (COZAAR) 50 MG tablet TAKE 1 TABLET DAILY 90 tablet 1    methotrexate (RHEUMATREX) 2.5 MG chemo tablet TAKE 8 TABLETS BY MOUTH ONCE WEEKLY TAKE ALL TABLETS AT THE SAME TIME 40 tablet 2    metFORMIN (GLUCOPHAGE) 500 MG tablet Take 1 tablet by mouth daily (with breakfast) 90 tablet 3    atenolol (TENORMIN) 50 MG tablet TAKE 1 TABLET DAILY 90 tablet 1    glimepiride (AMARYL) 2 MG tablet Take 1 1/2 tablets  BY MOUTH EVERY MORNING 135 tablet 1    pravastatin (PRAVACHOL) 40 MG tablet Take 1 tablet by mouth daily 90 tablet 1    FLUoxetine (PROZAC) 20 MG capsule Take 1 capsule by mouth daily 90 capsule 1    diclofenac (VOLTAREN) 75 MG EC tablet Take 1 tablet by mouth 2 times daily 60 tablet 2    folic acid (FOLVITE) 1 MG tablet TAKE 1 TABLET BY MOUTH EVERY DAY 30 tablet 5    hydroxychloroquine (PLAQUENIL) 200 MG tablet TAKE 1 TABLET BY MOUTH TWICE A  tablet 1    umeclidinium-vilanterol (ANORO ELLIPTA) 62.5-25 MCG/INH AEPB inhaler Inhale 1 puff into the lungs daily 3 each 3    glucose blood VI test strips (ONE TOUCH TEST STRIPS) strip 1 each by In Vitro route daily. Check FBS and 4 pm  each 3    ONE TOUCH LANCETS MISC Check FBS and 4 pm  each 3    Blood Glucose Monitoring Suppl (ONE TOUCH ULTRA 2) W/DEVICE KIT 1 kit by Does not apply route daily as needed. 1 kit 0     No current facility-administered medications for this visit.         ALLERGIES  Allergies   Allergen Reactions    Ace Inhibitors      cough    Actifed Cold-Allergy [Chlorpheniramine-Phenylephrine]     Cephalexin Other (See Comments)     Yeast infection if taken over a long period of time    Codeine Other (See Comments) dizziness    Erythromycin Base      Patient denies on 2/28/2013    Lisinopril Other (See Comments)     Cough     Lovastatin Other (See Comments)     Myalgias; mouth sores; ELEV LFT    Metformin And Related      Diarrhea (time release is better but insurance doesn't cover), leg cramps, fatigue.  Sudafed [Pseudoephedrine Hcl] Other (See Comments)     Shakes     Trinalin [Azatadine-Pseudoephedrine]          Comments  No specialty comments available. Background history:  Denise Granados is a 68 y.o. female who is being seen for follow up evaluation of  hand pain. Patient was referred due to bilateral hand pain and tingling/numbness. Symptoms started a couple of years ago. She has a history of bilateral carpal tunnel surgery one year ago. Symptoms improved initially and then started again. She recently underwent cervical spine laminectomy with fusion. Symptoms improved in the right hand. She has difficulty with fine motor activities. She drops things from both hands. She has pain in the knuckles with intermittent swelling and stiffness in the morning lasting for one hour. Symptoms worsen with cold and improve with warm soaks. She takes extra strength Tylenol without significant improvement  She has started occupational therapy recently. She was placed on gabapentin 1200 mg daily for weeks ago and hasn't noticed any relief yet. She also has low back and hip pain and stiffness. Pain in the hips is located on the lateral side and gets worse with walking and standing. She denies psoriasis, inflammatory bowel disease, inflammatory back pain, dactylitis, enthesitis, tenosynovitis or uveitis. She has a history of lumbar spine surgery for spinal stenosis and bilateral knee replacements. Interim history: She presents for follow-up of osteoarthritis, inflammatory arthritis. She is on Plaquenil 200 mg twice a day and methotrexate 8 tablets once a week.   She has noticed some improvement in joint dentures      Past Surgical History:   Procedure Laterality Date    CARDIAC CATHETERIZATION      CARPAL TUNNEL RELEASE Right 10/19/2017    CARPAL TUNNEL RELEASE Left 11/02/2017     Left  Carpal Tunnel Release & Left Ulnar Nerve decompression at the Cubital Tunnel     CERVICAL LAMINECTOMY  04/18/2018    POSTERIOR CERVICAL LAMINECTOMY AND POSTERIOR FUSION C3-C7 WITH MEDTRONIC,EVOKES AND O-ARM    COLONOSCOPY      COLONOSCOPY N/A 12/6/2018    COLONOSCOPY performed by Valeria R[facility]iro MD at [facility] N/A 05/08/2015    DILATATION AND CURETTAGE, HYSTEROSCOPY WITH MYOSURE    KNEE ARTHROPLASTY Right 11/03/2015    RIGHT TOTAL KNEE ARTHROPLASTY             SPINE SURGERY  02/07/2013    TONSILLECTOMY      TOTAL KNEE ARTHROPLASTY Left [id number]    ULNAR TUNNEL RELEASE Right 10/19/2017     Social History     Socioeconomic History    Marital status:      Spouse name: Not on file    Number of children: Not on file    Years of education: Not on file    Highest education level: Not on file   Occupational History    Occupation: retired   Social Needs    Financial resource strain: Not on file    Food insecurity:     Worry: Not on file     Inability: Not on file   [employer] needs:     Medical: Not on file     Non-medical: Not on file   Tobacco Use    Smoking status: Never Smoker    Smokeless tobacco: Never Used   Substance and Sexual Activity    Alcohol use:  Yes     Alcohol/week: 0.0 oz     Comment: soc    Drug use: No    Sexual activity: Yes     Partners: Male   Lifestyle    Physical activity:     Days per week: Not on file     Minutes per session: Not on file    Stress: Not on file   Relationships    Social connections:     Talks on phone: Not on file     Gets together: Not on file     Attends Rastafarian service: Not on file     Active member of club or organization: Not on file     Attends meetings of clubs or organizations: Not on file

## 2019-06-18 ENCOUNTER — NURSE ONLY (OUTPATIENT)
Dept: FAMILY MEDICINE CLINIC | Age: 74
End: 2019-06-18
Payer: COMMERCIAL

## 2019-06-18 DIAGNOSIS — D50.9 IRON DEFICIENCY ANEMIA, UNSPECIFIED IRON DEFICIENCY ANEMIA TYPE: Primary | Chronic | ICD-10-CM

## 2019-06-18 LAB — SEDIMENTATION RATE, ERYTHROCYTE: 35 MM/HR (ref 0–30)

## 2019-06-18 PROCEDURE — 96372 THER/PROPH/DIAG INJ SC/IM: CPT | Performed by: FAMILY MEDICINE

## 2019-06-18 RX ORDER — CYANOCOBALAMIN 1000 UG/ML
1000 INJECTION INTRAMUSCULAR; SUBCUTANEOUS ONCE
Status: COMPLETED | OUTPATIENT
Start: 2019-06-18 | End: 2019-06-18

## 2019-06-18 RX ORDER — LOSARTAN POTASSIUM 50 MG/1
TABLET ORAL
Qty: 90 TABLET | Refills: 1 | Status: SHIPPED | OUTPATIENT
Start: 2019-06-18 | End: 2020-01-13 | Stop reason: SDUPTHER

## 2019-06-18 RX ADMIN — CYANOCOBALAMIN 1000 MCG: 1000 INJECTION INTRAMUSCULAR; SUBCUTANEOUS at 14:37

## 2019-06-18 NOTE — PROGRESS NOTES
After obtaining consent, and per orders of Dr. Patt Justice, injection of B12 given in Right deltoid by Leonel Taylor.

## 2019-06-24 ENCOUNTER — TELEPHONE (OUTPATIENT)
Dept: RHEUMATOLOGY | Age: 74
End: 2019-06-24

## 2019-07-18 ENCOUNTER — NURSE ONLY (OUTPATIENT)
Dept: FAMILY MEDICINE CLINIC | Age: 74
End: 2019-07-18
Payer: COMMERCIAL

## 2019-07-18 DIAGNOSIS — D50.9 IRON DEFICIENCY ANEMIA, UNSPECIFIED IRON DEFICIENCY ANEMIA TYPE: Primary | Chronic | ICD-10-CM

## 2019-07-18 PROCEDURE — 96372 THER/PROPH/DIAG INJ SC/IM: CPT | Performed by: FAMILY MEDICINE

## 2019-07-18 RX ORDER — CYANOCOBALAMIN 1000 UG/ML
1000 INJECTION INTRAMUSCULAR; SUBCUTANEOUS ONCE
Status: COMPLETED | OUTPATIENT
Start: 2019-07-18 | End: 2019-07-18

## 2019-07-18 RX ADMIN — CYANOCOBALAMIN 1000 MCG: 1000 INJECTION INTRAMUSCULAR; SUBCUTANEOUS at 14:21

## 2019-08-05 ENCOUNTER — HOSPITAL ENCOUNTER (OUTPATIENT)
Dept: WOMENS IMAGING | Age: 74
Discharge: HOME | End: 2019-08-05
Payer: COMMERCIAL

## 2019-08-05 DIAGNOSIS — Z12.39 BREAST CANCER SCREENING: ICD-10-CM

## 2019-08-05 PROCEDURE — 77063 BREAST TOMOSYNTHESIS BI: CPT

## 2019-08-26 ENCOUNTER — TELEPHONE (OUTPATIENT)
Dept: INTERNAL MEDICINE CLINIC | Age: 74
End: 2019-08-26

## 2019-08-26 DIAGNOSIS — M15.9 PRIMARY OSTEOARTHRITIS INVOLVING MULTIPLE JOINTS: ICD-10-CM

## 2019-08-26 RX ORDER — DICLOFENAC SODIUM 75 MG/1
75 TABLET, DELAYED RELEASE ORAL 2 TIMES DAILY
Qty: 180 TABLET | Refills: 0 | Status: SHIPPED | OUTPATIENT
Start: 2019-08-26 | End: 2019-09-17 | Stop reason: SINTOL

## 2019-08-26 NOTE — TELEPHONE ENCOUNTER
Patient says that someone called her about her medications for MTX. Also said something about an appointment change. Did not see anything in chart.

## 2019-08-29 ENCOUNTER — TELEPHONE (OUTPATIENT)
Dept: RHEUMATOLOGY | Age: 74
End: 2019-08-29

## 2019-08-29 ENCOUNTER — NURSE ONLY (OUTPATIENT)
Dept: FAMILY MEDICINE CLINIC | Age: 74
End: 2019-08-29
Payer: COMMERCIAL

## 2019-08-29 DIAGNOSIS — D50.9 IRON DEFICIENCY ANEMIA, UNSPECIFIED IRON DEFICIENCY ANEMIA TYPE: Chronic | ICD-10-CM

## 2019-08-29 DIAGNOSIS — M19.90 INFLAMMATORY ARTHRITIS: ICD-10-CM

## 2019-08-29 PROCEDURE — 96372 THER/PROPH/DIAG INJ SC/IM: CPT | Performed by: FAMILY MEDICINE

## 2019-08-29 RX ORDER — CYANOCOBALAMIN 1000 UG/ML
1000 INJECTION INTRAMUSCULAR; SUBCUTANEOUS ONCE
Status: COMPLETED | OUTPATIENT
Start: 2019-08-29 | End: 2019-08-29

## 2019-08-29 RX ADMIN — CYANOCOBALAMIN 1000 MCG: 1000 INJECTION INTRAMUSCULAR; SUBCUTANEOUS at 14:26

## 2019-09-02 DIAGNOSIS — M19.90 INFLAMMATORY ARTHRITIS: ICD-10-CM

## 2019-09-05 ENCOUNTER — OFFICE VISIT (OUTPATIENT)
Dept: FAMILY MEDICINE CLINIC | Age: 74
End: 2019-09-05
Payer: COMMERCIAL

## 2019-09-05 ENCOUNTER — TELEPHONE (OUTPATIENT)
Dept: RHEUMATOLOGY | Age: 74
End: 2019-09-05

## 2019-09-05 VITALS
DIASTOLIC BLOOD PRESSURE: 80 MMHG | TEMPERATURE: 98.4 F | SYSTOLIC BLOOD PRESSURE: 118 MMHG | BODY MASS INDEX: 37.43 KG/M2 | WEIGHT: 239 LBS

## 2019-09-05 DIAGNOSIS — K12.1 STOMATITIS, ULCERATIVE: Primary | ICD-10-CM

## 2019-09-05 PROCEDURE — 99213 OFFICE O/P EST LOW 20 MIN: CPT | Performed by: FAMILY MEDICINE

## 2019-09-05 NOTE — PROGRESS NOTES
Subjective:      Patient ID: Clifford Collier is a 68 y.o. female. HPI Patient presents today with \"mouth sores. \" 6 Days ago, she developed two \"fever blisters\" on external surface of lips (one on upper, one on lower lip) with mild swelling. The blisters have been \"achey\" and sore to the touch. No burning. 4 days ago, she developed more blisters - on inside of upper and lower lip and on lateral surfaces of tongue. No new blisters since then. Existing blisters have slowly healed, but those on inside of mouth are still painful. Pt able to eat and drink, just avoiding salty or crunchy food. Sleeping alright, but tossing and turning. No fevers. No rashes or lesions anywhere else on body. Never had this before. No hx of oral herpes. Using OTC oral anesthetic (benzocaine) 4 times daily for 6 days, with little effect other than burning sensation upon application. Of note, patient was started on methotrexate 2-3 months ago, by Rheumatology (Dr. Wayne Harrell). Review of Systems   +Malaise and fatigue (past 3 days). +Sore throat (past 4 days). +Nasal drainage (today only) +Headache  No congestion, cough, tchy/watery/red eyes. No CP, no palpitations, no sob, no edema, no cough, no change in bowel or bladder,  no nausea, no vomiting, no abdominal pain. Objective:   Physical Exam  Body mass index is 37.43 kg/m². Vitals:    09/05/19 1139   BP: 118/80   Temp: 98.4 °F (36.9 °C)   Weight: 239 lb (108.4 kg)     Wt Readings from Last 3 Encounters:   09/05/19 239 lb (108.4 kg)   06/17/19 236 lb (107 kg)   05/22/19 240 lb (108.9 kg)     PHQ score: PHQ-9 Total Score: 0 (5/22/2019 11:43 AM)    GENERAL:Alert and oriented x 4 NAD, well hydrated, well developed.   NECK:supple and non tender without mass, no thyromegaly or thyroid nodules, no cervical lymphadenopathy  LUNG:clear to auscultation bilaterally with normal respiratory effort  CV: Normal heart sounds, regular rate and rhythm without murmurs    HEENT:  NECK: supple and non tender, no cervical lymphadenopathy   EARS: Left external ear, canal, and TM Normal.  Right Left external ear, canal, and TM Normal.   Nose: mucosa pink, No drainage  Mouth: Good dentition. MMM   OP: No erythema, no exudate, no swelling. No tonsils. External lips - 1 healing lesion at midline of upper lip, scabbed over. Mucosal surface of lips - 1 vesicular lesion, <1cm, on upper lip. No lesions visualized on lower lip. Tongue - 1 vesicular lesion, <1cm, on left lateral surface of tongue. No other lesion visible     Assessment & Plan:      Rod Vyas was seen today for mouth lesions. Diagnoses and all orders for this visit:    Stomatitis, ulcerative   - DDX includes viral etiology, potentially HSV1, OR medication SE (Methotrexate)   - START Benadryl elixir, nystatin suspension, and Maalox liquid equal parts      take 5 mL swish and swallow 4 times daily. Dispense 6 ounces total   - If sx worsen or fail to improve, follow up with Rheumatology about Methotrexate    Return for regularly scheduled follow-up. This note scribed by Holden Davis, Medical Student. Patient was seen and treatment agreed upon with Dr. Gladys Roth.

## 2019-09-10 ENCOUNTER — HOSPITAL ENCOUNTER (OUTPATIENT)
Age: 74
Discharge: HOME | End: 2019-09-10
Payer: COMMERCIAL

## 2019-09-10 DIAGNOSIS — E78.5 HYPERLIPIDEMIA, UNSPECIFIED HYPERLIPIDEMIA TYPE: Chronic | ICD-10-CM

## 2019-09-10 DIAGNOSIS — E11.9 TYPE 2 DIABETES MELLITUS WITHOUT COMPLICATION, WITHOUT LONG-TERM CURRENT USE OF INSULIN (HCC): ICD-10-CM

## 2019-09-10 DIAGNOSIS — E53.8 LOW VITAMIN B12 LEVEL: ICD-10-CM

## 2019-09-10 DIAGNOSIS — Z79.899 HIGH RISK MEDICATION USE: ICD-10-CM

## 2019-09-10 LAB
A/G RATIO: 1.3 (ref 1.1–2.2)
ALBUMIN SERPL-MCNC: 4.4 G/DL (ref 3.4–5)
ALP BLD-CCNC: 129 U/L (ref 40–129)
ALT SERPL-CCNC: 13 U/L (ref 10–40)
ANION GAP SERPL CALCULATED.3IONS-SCNC: 16 MMOL/L (ref 3–16)
AST SERPL-CCNC: 12 U/L (ref 15–37)
BASOPHILS ABSOLUTE: 0.1 K/UL (ref 0–0.2)
BASOPHILS RELATIVE PERCENT: 0.7 %
BILIRUB SERPL-MCNC: 0.3 MG/DL (ref 0–1)
BUN BLDV-MCNC: 12 MG/DL (ref 7–20)
CALCIUM SERPL-MCNC: 9.8 MG/DL (ref 8.3–10.6)
CHLORIDE BLD-SCNC: 101 MMOL/L (ref 99–110)
CO2: 23 MMOL/L (ref 21–32)
CREAT SERPL-MCNC: 0.6 MG/DL (ref 0.6–1.2)
EOSINOPHILS ABSOLUTE: 0.3 K/UL (ref 0–0.6)
EOSINOPHILS RELATIVE PERCENT: 4 %
FOLATE: >20 NG/ML (ref 4.78–24.2)
GFR AFRICAN AMERICAN: >60
GFR NON-AFRICAN AMERICAN: >60
GLOBULIN: 3.3 G/DL
GLUCOSE FASTING: 136 MG/DL (ref 70–99)
HCT VFR BLD CALC: 41 % (ref 36–48)
HEMOGLOBIN: 13.6 G/DL (ref 12–16)
LYMPHOCYTES ABSOLUTE: 1.8 K/UL (ref 1–5.1)
LYMPHOCYTES RELATIVE PERCENT: 22.1 %
MCH RBC QN AUTO: 32.5 PG (ref 26–34)
MCHC RBC AUTO-ENTMCNC: 33.2 G/DL (ref 31–36)
MCV RBC AUTO: 97.7 FL (ref 80–100)
MONOCYTES ABSOLUTE: 0.8 K/UL (ref 0–1.3)
MONOCYTES RELATIVE PERCENT: 9.4 %
NEUTROPHILS ABSOLUTE: 5.1 K/UL (ref 1.7–7.7)
NEUTROPHILS RELATIVE PERCENT: 63.8 %
PDW BLD-RTO: 15 % (ref 12.4–15.4)
PLATELET # BLD: 327 K/UL (ref 135–450)
PMV BLD AUTO: 9.8 FL (ref 5–10.5)
POTASSIUM SERPL-SCNC: 4.4 MMOL/L (ref 3.5–5.1)
RBC # BLD: 4.2 M/UL (ref 4–5.2)
SODIUM BLD-SCNC: 140 MMOL/L (ref 136–145)
TOTAL PROTEIN: 7.7 G/DL (ref 6.4–8.2)
TSH REFLEX: 2.68 UIU/ML (ref 0.27–4.2)
VITAMIN B-12: 521 PG/ML (ref 211–911)
WBC # BLD: 8 K/UL (ref 4–11)

## 2019-09-10 PROCEDURE — 85025 COMPLETE CBC W/AUTO DIFF WBC: CPT

## 2019-09-10 PROCEDURE — 84443 ASSAY THYROID STIM HORMONE: CPT

## 2019-09-10 PROCEDURE — 82607 VITAMIN B-12: CPT

## 2019-09-10 PROCEDURE — 36415 COLL VENOUS BLD VENIPUNCTURE: CPT

## 2019-09-10 PROCEDURE — 82746 ASSAY OF FOLIC ACID SERUM: CPT

## 2019-09-10 PROCEDURE — 80053 COMPREHEN METABOLIC PANEL: CPT

## 2019-09-10 PROCEDURE — 83036 HEMOGLOBIN GLYCOSYLATED A1C: CPT

## 2019-09-11 LAB
ESTIMATED AVERAGE GLUCOSE: 142.7 MG/DL
HBA1C MFR BLD: 6.6 %

## 2019-09-11 RX ORDER — FOLIC ACID 1 MG/1
TABLET ORAL
Qty: 30 TABLET | Refills: 5 | Status: SHIPPED | OUTPATIENT
Start: 2019-09-11 | End: 2019-09-17 | Stop reason: SINTOL

## 2019-09-13 ENCOUNTER — TELEPHONE (OUTPATIENT)
Dept: PHARMACY | Facility: CLINIC | Age: 74
End: 2019-09-13

## 2019-09-17 ENCOUNTER — OFFICE VISIT (OUTPATIENT)
Dept: RHEUMATOLOGY | Age: 74
End: 2019-09-17
Payer: COMMERCIAL

## 2019-09-17 VITALS
WEIGHT: 243.2 LBS | DIASTOLIC BLOOD PRESSURE: 72 MMHG | HEART RATE: 68 BPM | SYSTOLIC BLOOD PRESSURE: 118 MMHG | BODY MASS INDEX: 38.09 KG/M2

## 2019-09-17 DIAGNOSIS — M06.00 SERONEGATIVE RHEUMATOID ARTHRITIS (HCC): Primary | ICD-10-CM

## 2019-09-17 DIAGNOSIS — Z79.899 HIGH RISK MEDICATION USE: ICD-10-CM

## 2019-09-17 DIAGNOSIS — M06.00 SERONEGATIVE RHEUMATOID ARTHRITIS (HCC): ICD-10-CM

## 2019-09-17 DIAGNOSIS — M15.9 PRIMARY OSTEOARTHRITIS INVOLVING MULTIPLE JOINTS: ICD-10-CM

## 2019-09-17 LAB
A/G RATIO: 1.6 (ref 1.1–2.2)
ALBUMIN SERPL-MCNC: 4.2 G/DL (ref 3.4–5)
ALP BLD-CCNC: 127 U/L (ref 40–129)
ALT SERPL-CCNC: 11 U/L (ref 10–40)
ANION GAP SERPL CALCULATED.3IONS-SCNC: 14 MMOL/L (ref 3–16)
AST SERPL-CCNC: 11 U/L (ref 15–37)
BASOPHILS ABSOLUTE: 0.1 K/UL (ref 0–0.2)
BASOPHILS RELATIVE PERCENT: 0.7 %
BILIRUB SERPL-MCNC: <0.2 MG/DL (ref 0–1)
BUN BLDV-MCNC: 14 MG/DL (ref 7–20)
CALCIUM SERPL-MCNC: 9.6 MG/DL (ref 8.3–10.6)
CHLORIDE BLD-SCNC: 102 MMOL/L (ref 99–110)
CO2: 24 MMOL/L (ref 21–32)
CREAT SERPL-MCNC: <0.5 MG/DL (ref 0.6–1.2)
EOSINOPHILS ABSOLUTE: 0.3 K/UL (ref 0–0.6)
EOSINOPHILS RELATIVE PERCENT: 3.3 %
GFR AFRICAN AMERICAN: >60
GFR NON-AFRICAN AMERICAN: >60
GLOBULIN: 2.6 G/DL
GLUCOSE BLD-MCNC: 139 MG/DL (ref 70–99)
HCT VFR BLD CALC: 38.3 % (ref 36–48)
HEMOGLOBIN: 12.7 G/DL (ref 12–16)
LYMPHOCYTES ABSOLUTE: 1.5 K/UL (ref 1–5.1)
LYMPHOCYTES RELATIVE PERCENT: 16 %
MCH RBC QN AUTO: 32.2 PG (ref 26–34)
MCHC RBC AUTO-ENTMCNC: 33.1 G/DL (ref 31–36)
MCV RBC AUTO: 97.3 FL (ref 80–100)
MONOCYTES ABSOLUTE: 0.9 K/UL (ref 0–1.3)
MONOCYTES RELATIVE PERCENT: 10.1 %
NEUTROPHILS ABSOLUTE: 6.4 K/UL (ref 1.7–7.7)
NEUTROPHILS RELATIVE PERCENT: 69.9 %
PDW BLD-RTO: 14.7 % (ref 12.4–15.4)
PLATELET # BLD: 309 K/UL (ref 135–450)
PMV BLD AUTO: 9.4 FL (ref 5–10.5)
POTASSIUM SERPL-SCNC: 4.4 MMOL/L (ref 3.5–5.1)
RBC # BLD: 3.94 M/UL (ref 4–5.2)
SODIUM BLD-SCNC: 140 MMOL/L (ref 136–145)
TOTAL PROTEIN: 6.8 G/DL (ref 6.4–8.2)
WBC # BLD: 9.1 K/UL (ref 4–11)

## 2019-09-17 PROCEDURE — 99214 OFFICE O/P EST MOD 30 MIN: CPT | Performed by: INTERNAL MEDICINE

## 2019-09-17 NOTE — PROGRESS NOTES
2019  Patient Name: Octaviano Pelaez  : 1945  Medical Record: [id number]    MEDICATIONS  Current Outpatient Medications   Medication Sig Dispense Refill    Abatacept (ORENCIA CLICKJECT) 994 MG/ML SOAJ Inject 125 mg into the skin once a week 4 Syringe 5    losartan (COZAAR) 50 MG tablet TAKE 1 TABLET DAILY 90 tablet 1    hydrocortisone 2.5 % cream Apply to red scaly areas on the skin of the face twice daily for up to 2 weeks or until improved. 30 g 2    atenolol (TENORMIN) 50 MG tablet TAKE 1 TABLET DAILY 90 tablet 1    glimepiride (AMARYL) 2 MG tablet Take 1 1/2 tablets  BY MOUTH EVERY MORNING 135 tablet 1    pravastatin (PRAVACHOL) 40 MG tablet Take 1 tablet by mouth daily 90 tablet 1    FLUoxetine (PROZAC) 20 MG capsule Take 1 capsule by mouth daily 90 capsule 1    umeclidinium-vilanterol (ANORO ELLIPTA) 62.5-25 MCG/INH AEPB inhaler Inhale 1 puff into the lungs daily 3 each 3    glucose blood VI test strips (ONE TOUCH TEST STRIPS) strip 1 each by In Vitro route daily. Check FBS and 4 pm  each 3    ONE TOUCH LANCETS MISC Check FBS and 4 pm  each 3    Blood Glucose Monitoring Suppl (ONE TOUCH ULTRA 2) W/DEVICE KIT 1 kit by Does not apply route daily as needed. 1 kit 0     No current facility-administered medications for this visit. ALLERGIES  Allergies   Allergen Reactions    Ace Inhibitors      cough    Actifed Cold-Allergy [Chlorpheniramine-Phenylephrine]     Cephalexin Other (See Comments)     Yeast infection if taken over a long period of time    Codeine Other (See Comments)     dizziness    Erythromycin Base      Patient denies on 2013    Lisinopril Other (See Comments)     Cough     Lovastatin Other (See Comments)     Myalgias; mouth sores; ELEV LFT    Metformin And Related      Diarrhea (time release is better but insurance doesn't cover), leg cramps, fatigue.     Sudafed [Pseudoephedrine Hcl] Other (See Comments)     Shakes     Trinalin [Azatadine-Pseudoephedrine]          Comments  No specialty comments available. Background history:  Jimbo Neumann is a 68 y.o. female who is being seen for follow up evaluation of  hand pain. Patient was referred due to bilateral hand pain and tingling/numbness. Symptoms started a couple of years ago. She has a history of bilateral carpal tunnel surgery one year ago. Symptoms improved initially and then started again. She recently underwent cervical spine laminectomy with fusion. Symptoms improved in the right hand. She has difficulty with fine motor activities. She drops things from both hands. She has pain in the knuckles with intermittent swelling and stiffness in the morning lasting for one hour. Symptoms worsen with cold and improve with warm soaks. She takes extra strength Tylenol without significant improvement  She has started occupational therapy recently. She was placed on gabapentin 1200 mg daily for weeks ago and hasn't noticed any relief yet. She also has low back and hip pain and stiffness. Pain in the hips is located on the lateral side and gets worse with walking and standing. She denies psoriasis, inflammatory bowel disease, inflammatory back pain, dactylitis, enthesitis, tenosynovitis or uveitis. She has a history of lumbar spine surgery for spinal stenosis and bilateral knee replacements. Interim history: She presents for follow-up of osteoarthritis, seronegative rheumatoid arthritis. She took herself off of methotrexate due to oral ulcers. She also stopped Plaquenil, was not working. She also took herself off of diclofenac. She continues to complain of pain, swelling and stiffness in her joints. Symptoms are worse in her hands. She has morning stiffness lasting for 2 hours. She denies recent fevers or infections. HPI  ROS    REVIEW OF SYSTEMS: Positive for fatigue  Constitutional: No unanticipated weight loss or fevers.    Integumentary: No rash, results found for: SMAB, RNPAB  No results found for: CENTABIGG  No results found for: C3, C4, ACE  No results found for: Kristy Abarca    Lab Results   Component Value Date    TSHFT4 3.45 09/18/2018    TSH 2.90 01/04/2016       ASSESSMENT AND PLAN      Assessment/Plan:      ASSESSMENT:    1. Seronegative rheumatoid arthritis ([address]    2. Primary osteoarthritis involving multiple joints    3. High risk medication use        PLAN:  1. Seronegative rheumatoid arthritis (HCC)  RF, CCP negative, elevated ESR and CRP  -Significant improvement with prednisone  -Stop methotrexate [oral ulcers], Plaquenil [ineffective]  -Active synovitis on joint exam  -Start Orencia subcu once a week  -Check QuantiFERON TB Gold  - Quantiferon, Incubated; Future  - CBC Auto Differential; Future  - Comprehensive Metabolic Panel; Future  - Abatacept (ORENCIA CLICKJECT) 652 MG/ML SOAJ; Inject 125 mg into the skin once a week  Dispense: 4 Syringe; Refill: 5    2. Primary osteoarthritis involving multiple joints  Advised to take diclofenac 75 mg twice a day as needed    3. High risk medication use  Side effects of Orencia include increased risk of opportunistic infections, reactivation of TB, COPD exacerbation, increased risk of malignancy and was explained to the patient in detail    The patient indicates understanding of these issues and agrees with the plan. Return in about 6 weeks (around 10/29/2019). The risks and benefits of my recommendations, as well as other treatment options, benefits and side effects were discussed with the patient. All questions were answered. ######################################################################    I thank you for giving me the opportunity to participate in [facility]. If you have any questions or concerns please feel free to contact me. I look forward to following  Car Jacey along with you.       Electronically signed by: Oni Jolley MD, 9/17/2019 9:13 AM    Documentation was done using voice recognition dragon software. Every effort was made to ensure accuracy; however, inadvertent unintentional computerized transcription errors may be present.

## 2019-09-20 LAB
QUANTI TB GOLD PLUS: NEGATIVE
QUANTI TB1 MINUS NIL: 0 IU/ML (ref 0–0.34)
QUANTI TB2 MINUS NIL: 0 IU/ML (ref 0–0.34)
QUANTIFERON MITOGEN: 2.13 IU/ML
QUANTIFERON NIL: 0.04 IU/ML

## 2019-09-23 ENCOUNTER — OFFICE VISIT (OUTPATIENT)
Dept: FAMILY MEDICINE CLINIC | Age: 74
End: 2019-09-23
Payer: COMMERCIAL

## 2019-09-23 VITALS — DIASTOLIC BLOOD PRESSURE: 80 MMHG | SYSTOLIC BLOOD PRESSURE: 126 MMHG | OXYGEN SATURATION: 95 % | HEART RATE: 70 BPM

## 2019-09-23 DIAGNOSIS — M06.00 SERONEGATIVE RHEUMATOID ARTHRITIS (HCC): ICD-10-CM

## 2019-09-23 DIAGNOSIS — J44.9 COPD, MODERATE (HCC): ICD-10-CM

## 2019-09-23 DIAGNOSIS — I10 ESSENTIAL HYPERTENSION: Chronic | ICD-10-CM

## 2019-09-23 DIAGNOSIS — E55.9 VITAMIN D DEFICIENCY: ICD-10-CM

## 2019-09-23 DIAGNOSIS — E11.9 TYPE 2 DIABETES MELLITUS WITHOUT COMPLICATION, WITHOUT LONG-TERM CURRENT USE OF INSULIN (HCC): Primary | ICD-10-CM

## 2019-09-23 DIAGNOSIS — E78.5 HYPERLIPIDEMIA, UNSPECIFIED HYPERLIPIDEMIA TYPE: Chronic | ICD-10-CM

## 2019-09-23 DIAGNOSIS — E53.8 VITAMIN B 12 DEFICIENCY: ICD-10-CM

## 2019-09-23 DIAGNOSIS — M17.0 OSTEOARTHRITIS OF BOTH KNEES, UNSPECIFIED OSTEOARTHRITIS TYPE: ICD-10-CM

## 2019-09-23 DIAGNOSIS — F41.1 ANXIETY STATE: ICD-10-CM

## 2019-09-23 PROCEDURE — 96372 THER/PROPH/DIAG INJ SC/IM: CPT | Performed by: FAMILY MEDICINE

## 2019-09-23 PROCEDURE — 99214 OFFICE O/P EST MOD 30 MIN: CPT | Performed by: FAMILY MEDICINE

## 2019-09-23 PROCEDURE — G8510 SCR DEP NEG, NO PLAN REQD: HCPCS | Performed by: FAMILY MEDICINE

## 2019-09-23 PROCEDURE — 3288F FALL RISK ASSESSMENT DOCD: CPT | Performed by: FAMILY MEDICINE

## 2019-09-23 RX ORDER — ATENOLOL 50 MG/1
TABLET ORAL
Qty: 90 TABLET | Refills: 1 | Status: SHIPPED | OUTPATIENT
Start: 2019-09-23 | End: 2020-01-13 | Stop reason: SDUPTHER

## 2019-09-23 RX ORDER — DICLOFENAC SODIUM 75 MG/1
75 TABLET, DELAYED RELEASE ORAL 2 TIMES DAILY
COMMUNITY
End: 2020-01-13

## 2019-09-23 RX ORDER — FLUOXETINE HYDROCHLORIDE 20 MG/1
20 CAPSULE ORAL DAILY
Qty: 90 CAPSULE | Refills: 1 | Status: SHIPPED | OUTPATIENT
Start: 2019-09-23 | End: 2020-01-13 | Stop reason: SDUPTHER

## 2019-09-23 RX ORDER — CYANOCOBALAMIN 1000 UG/ML
1000 INJECTION INTRAMUSCULAR; SUBCUTANEOUS ONCE
Status: COMPLETED | OUTPATIENT
Start: 2019-09-23 | End: 2019-09-23

## 2019-09-23 RX ADMIN — CYANOCOBALAMIN 1000 MCG: 1000 INJECTION INTRAMUSCULAR; SUBCUTANEOUS at 11:01

## 2019-09-23 ASSESSMENT — PATIENT HEALTH QUESTIONNAIRE - PHQ9
SUM OF ALL RESPONSES TO PHQ QUESTIONS 1-9: 0
2. FEELING DOWN, DEPRESSED OR HOPELESS: 0
1. LITTLE INTEREST OR PLEASURE IN DOING THINGS: 0
SUM OF ALL RESPONSES TO PHQ QUESTIONS 1-9: 0
SUM OF ALL RESPONSES TO PHQ9 QUESTIONS 1 & 2: 0

## 2019-09-23 ASSESSMENT — COPD QUESTIONNAIRES: COPD: 1

## 2019-09-23 ASSESSMENT — ENCOUNTER SYMPTOMS: VISUAL CHANGE: 0

## 2019-09-23 NOTE — PROGRESS NOTES
hypertension and a sedentary lifestyle. Hypertension   This is a chronic problem. The current episode started more than 1 year ago. The problem is unchanged. The problem is controlled. Pertinent negatives include no chest pain. Risk factors for coronary artery disease include diabetes mellitus, dyslipidemia, obesity and sedentary lifestyle. Past treatments include angiotensin blockers and beta blockers. There are no compliance problems. Patient's medications, allergies, past medical,surgical, social and family histories were reviewed and updated as appropriate.      Past Medical History:   Diagnosis Date    Anxiety     BCC (basal cell carcinoma of skin)     NBCC     COPD (chronic obstructive pulmonary disease) (HCC)     Hyperlipidemia     Hypertension     Obesity     Osteoarthritis     Type 2 diabetes mellitus ([facility] [address] 2/27/2015    Wears partial dentures      Past Surgical History:   Procedure Laterality Date    CARDIAC CATHETERIZATION      CARPAL TUNNEL RELEASE Right 10/19/2017    CARPAL TUNNEL RELEASE Left 11/02/2017     Left  Carpal Tunnel Release & Left Ulnar Nerve decompression at the Cubital Tunnel     CERVICAL LAMINECTOMY  04/18/2018    POSTERIOR CERVICAL LAMINECTOMY AND POSTERIOR FUSION C3-C7 WITH Barafon,EVOKES AND O-ARM    COLONOSCOPY      COLONOSCOPY N/A 12/6/2018    COLONOSCOPY performed [facility] Andre [facility]ineda [facility] at [facility] N/A 05/08/2015    DILATATION AND CURETTAGE, HYSTEROSCOPY WITH MYOSURE    KNEE ARTHROPLASTY Right 11/03/2015    RIGHT TOTAL KNEE ARTHROPLASTY             SPINE SURGERY  02/07/2013    TONSILLECTOMY      TOTAL KNEE ARTHROPLASTY Left [id number]    ULNAR TUNNEL RELEASE Right 10/19/2017     Family History   Problem Relation Age of Onset    Heart Disease Mother         pacemaker    Cancer Mother         colon    COPD Father     Heart Disease Father     Colon Cancer Maternal Grandmother     Heart Disease Maternal release is better but insurance doesn't cover), leg cramps, fatigue.  Sudafed [Pseudoephedrine Hcl] Other (See Comments)     Shakes     Trinalin [Azatadine-Pseudoephedrine]      Current Outpatient Medications   Medication Sig Dispense Refill    diclofenac (VOLTAREN) 75 MG EC tablet Take 75 mg by mouth 2 times daily      atenolol (TENORMIN) 50 MG tablet TAKE 1 TABLET DAILY 90 tablet 1    FLUoxetine (PROZAC) 20 MG capsule Take 1 capsule by mouth daily 90 capsule 1    losartan (COZAAR) 50 MG tablet TAKE 1 TABLET DAILY 90 tablet 1    hydrocortisone 2.5 % cream Apply to red scaly areas on the skin of the face twice daily for up to 2 weeks or until improved. 30 g 2    glimepiride (AMARYL) 2 MG tablet Take 1 1/2 tablets  BY MOUTH EVERY MORNING 135 tablet 1    pravastatin (PRAVACHOL) 40 MG tablet Take 1 tablet by mouth daily 90 tablet 1    umeclidinium-vilanterol (ANORO ELLIPTA) 62.5-25 MCG/INH AEPB inhaler Inhale 1 puff into the lungs daily 3 each 3    glucose blood VI test strips (ONE TOUCH TEST STRIPS) strip 1 each by In Vitro route daily. Check FBS and 4 pm  each 3    ONE TOUCH LANCETS MISC Check FBS and 4 pm  each 3    Blood Glucose Monitoring Suppl (ONE TOUCH ULTRA 2) W/DEVICE KIT 1 kit by Does not apply route daily as needed. 1 kit 0    Abatacept (ORENCIA CLICKJECT) 155 MG/ML SOAJ Inject 125 mg into the skin once a week (Patient not taking: Reported on 9/23/2019) 4 Syringe 5     No current facility-administered medications for this visit. Review of Systems   Constitutional: Negative for fatigue and weight loss. Cardiovascular: Negative for chest pain. Endocrine: Negative for polyphagia and polyuria. OBJECTIVE:    /80   Pulse 70   LMP 03/01/1998   SpO2 95%    Physical Exam   Constitutional: She is oriented to person, place, and time. She appears well-developed and well-nourished. HENT:   Head: Normocephalic and atraumatic.    Right Ear: External ear normal.

## 2019-09-30 ENCOUNTER — TELEPHONE (OUTPATIENT)
Dept: INTERNAL MEDICINE CLINIC | Age: 74
End: 2019-09-30

## 2019-10-03 DIAGNOSIS — M06.00 SERONEGATIVE RHEUMATOID ARTHRITIS (HCC): ICD-10-CM

## 2019-10-09 DIAGNOSIS — M06.00 SERONEGATIVE RHEUMATOID ARTHRITIS (HCC): Primary | ICD-10-CM

## 2019-10-22 ENCOUNTER — NURSE ONLY (OUTPATIENT)
Dept: FAMILY MEDICINE CLINIC | Age: 74
End: 2019-10-22
Payer: COMMERCIAL

## 2019-10-22 ENCOUNTER — OFFICE VISIT (OUTPATIENT)
Dept: RHEUMATOLOGY | Age: 74
End: 2019-10-22
Payer: COMMERCIAL

## 2019-10-22 VITALS
HEIGHT: 67 IN | DIASTOLIC BLOOD PRESSURE: 81 MMHG | SYSTOLIC BLOOD PRESSURE: 131 MMHG | WEIGHT: 244 LBS | BODY MASS INDEX: 38.3 KG/M2

## 2019-10-22 DIAGNOSIS — Z79.899 HIGH RISK MEDICATION USE: ICD-10-CM

## 2019-10-22 DIAGNOSIS — E53.8 VITAMIN B12 DEFICIENCY: Primary | ICD-10-CM

## 2019-10-22 DIAGNOSIS — M06.00 SERONEGATIVE RHEUMATOID ARTHRITIS (HCC): Primary | ICD-10-CM

## 2019-10-22 DIAGNOSIS — M15.9 PRIMARY OSTEOARTHRITIS INVOLVING MULTIPLE JOINTS: ICD-10-CM

## 2019-10-22 LAB
ALT SERPL-CCNC: 13 U/L (ref 10–40)
AST SERPL-CCNC: 11 U/L (ref 15–37)
BASOPHILS ABSOLUTE: 0.1 K/UL (ref 0–0.2)
BASOPHILS RELATIVE PERCENT: 0.6 %
CREAT SERPL-MCNC: 0.5 MG/DL (ref 0.6–1.2)
EOSINOPHILS ABSOLUTE: 0.3 K/UL (ref 0–0.6)
EOSINOPHILS RELATIVE PERCENT: 3.1 %
GFR AFRICAN AMERICAN: >60
GFR NON-AFRICAN AMERICAN: >60
HCT VFR BLD CALC: 41 % (ref 36–48)
HEMOGLOBIN: 13.4 G/DL (ref 12–16)
LYMPHOCYTES ABSOLUTE: 1.6 K/UL (ref 1–5.1)
LYMPHOCYTES RELATIVE PERCENT: 17.5 %
MCH RBC QN AUTO: 31.7 PG (ref 26–34)
MCHC RBC AUTO-ENTMCNC: 32.8 G/DL (ref 31–36)
MCV RBC AUTO: 96.7 FL (ref 80–100)
MONOCYTES ABSOLUTE: 0.8 K/UL (ref 0–1.3)
MONOCYTES RELATIVE PERCENT: 8.7 %
NEUTROPHILS ABSOLUTE: 6.4 K/UL (ref 1.7–7.7)
NEUTROPHILS RELATIVE PERCENT: 70.1 %
PDW BLD-RTO: 14.3 % (ref 12.4–15.4)
PLATELET # BLD: 342 K/UL (ref 135–450)
PMV BLD AUTO: 9.3 FL (ref 5–10.5)
RBC # BLD: 4.24 M/UL (ref 4–5.2)
WBC # BLD: 9.2 K/UL (ref 4–11)

## 2019-10-22 PROCEDURE — 96372 THER/PROPH/DIAG INJ SC/IM: CPT | Performed by: FAMILY MEDICINE

## 2019-10-22 PROCEDURE — 99214 OFFICE O/P EST MOD 30 MIN: CPT | Performed by: INTERNAL MEDICINE

## 2019-10-22 RX ORDER — LEFLUNOMIDE 20 MG/1
20 TABLET ORAL DAILY
Qty: 30 TABLET | Refills: 3 | Status: SHIPPED | OUTPATIENT
Start: 2019-10-22 | End: 2019-10-22 | Stop reason: SDUPTHER

## 2019-10-22 RX ORDER — CYANOCOBALAMIN 1000 UG/ML
1000 INJECTION INTRAMUSCULAR; SUBCUTANEOUS ONCE
Status: COMPLETED | OUTPATIENT
Start: 2019-10-22 | End: 2019-10-22

## 2019-10-22 RX ORDER — LEFLUNOMIDE 20 MG/1
20 TABLET ORAL DAILY
Qty: 30 TABLET | Refills: 1 | Status: SHIPPED | OUTPATIENT
Start: 2019-10-22 | End: 2020-01-13

## 2019-10-22 RX ADMIN — CYANOCOBALAMIN 1000 MCG: 1000 INJECTION INTRAMUSCULAR; SUBCUTANEOUS at 10:34

## 2019-11-16 DIAGNOSIS — E11.9 TYPE 2 DIABETES MELLITUS WITHOUT COMPLICATION, WITHOUT LONG-TERM CURRENT USE OF INSULIN (HCC): ICD-10-CM

## 2019-11-16 DIAGNOSIS — M15.9 PRIMARY OSTEOARTHRITIS INVOLVING MULTIPLE JOINTS: ICD-10-CM

## 2019-11-18 RX ORDER — GLIMEPIRIDE 2 MG/1
TABLET ORAL
Qty: 135 TABLET | Refills: 0 | Status: SHIPPED | OUTPATIENT
Start: 2019-11-18 | End: 2020-01-13 | Stop reason: SDUPTHER

## 2019-11-18 RX ORDER — DICLOFENAC SODIUM 75 MG/1
TABLET, DELAYED RELEASE ORAL
Qty: 180 TABLET | Refills: 0 | Status: SHIPPED | OUTPATIENT
Start: 2019-11-18 | End: 2020-01-08

## 2019-11-22 ENCOUNTER — NURSE ONLY (OUTPATIENT)
Dept: FAMILY MEDICINE CLINIC | Age: 74
End: 2019-11-22
Payer: COMMERCIAL

## 2019-11-22 DIAGNOSIS — E53.8 VITAMIN B12 DEFICIENCY: Primary | ICD-10-CM

## 2019-11-22 PROCEDURE — 96372 THER/PROPH/DIAG INJ SC/IM: CPT | Performed by: FAMILY MEDICINE

## 2019-11-22 RX ORDER — CYANOCOBALAMIN 1000 UG/ML
1000 INJECTION INTRAMUSCULAR; SUBCUTANEOUS ONCE
Status: COMPLETED | OUTPATIENT
Start: 2019-11-22 | End: 2019-11-22

## 2019-11-22 RX ADMIN — CYANOCOBALAMIN 1000 MCG: 1000 INJECTION INTRAMUSCULAR; SUBCUTANEOUS at 15:27

## 2019-12-20 ENCOUNTER — NURSE ONLY (OUTPATIENT)
Dept: FAMILY MEDICINE CLINIC | Age: 74
End: 2019-12-20
Payer: COMMERCIAL

## 2019-12-20 DIAGNOSIS — D51.0 PERNICIOUS ANEMIA: Primary | ICD-10-CM

## 2019-12-20 PROCEDURE — 96372 THER/PROPH/DIAG INJ SC/IM: CPT | Performed by: FAMILY MEDICINE

## 2019-12-20 RX ORDER — CYANOCOBALAMIN 1000 UG/ML
1000 INJECTION INTRAMUSCULAR; SUBCUTANEOUS ONCE
Status: COMPLETED | OUTPATIENT
Start: 2019-12-20 | End: 2019-12-20

## 2019-12-20 RX ADMIN — CYANOCOBALAMIN 1000 MCG: 1000 INJECTION INTRAMUSCULAR; SUBCUTANEOUS at 14:27

## 2020-01-03 ENCOUNTER — HOSPITAL ENCOUNTER (OUTPATIENT)
Age: 75
Discharge: HOME | End: 2020-01-03
Payer: COMMERCIAL

## 2020-01-03 LAB
A/G RATIO: 1.2 (ref 1.1–2.2)
ALBUMIN SERPL-MCNC: 4.1 G/DL (ref 3.4–5)
ALP BLD-CCNC: 133 U/L (ref 40–129)
ALT SERPL-CCNC: 14 U/L (ref 10–40)
ANION GAP SERPL CALCULATED.3IONS-SCNC: 17 MMOL/L (ref 3–16)
AST SERPL-CCNC: 14 U/L (ref 15–37)
BILIRUB SERPL-MCNC: 0.3 MG/DL (ref 0–1)
BUN BLDV-MCNC: 13 MG/DL (ref 7–20)
CALCIUM SERPL-MCNC: 10 MG/DL (ref 8.3–10.6)
CHLORIDE BLD-SCNC: 97 MMOL/L (ref 99–110)
CHOLESTEROL, FASTING: 258 MG/DL (ref 0–199)
CO2: 24 MMOL/L (ref 21–32)
CREAT SERPL-MCNC: 0.5 MG/DL (ref 0.6–1.2)
FOLATE: 17.06 NG/ML (ref 4.78–24.2)
GFR AFRICAN AMERICAN: >60
GFR NON-AFRICAN AMERICAN: >60
GLOBULIN: 3.5 G/DL
GLUCOSE FASTING: 138 MG/DL (ref 70–99)
HDLC SERPL-MCNC: 73 MG/DL (ref 40–60)
LDL CHOLESTEROL CALCULATED: 164 MG/DL
POTASSIUM SERPL-SCNC: 4.5 MMOL/L (ref 3.5–5.1)
SODIUM BLD-SCNC: 138 MMOL/L (ref 136–145)
TOTAL PROTEIN: 7.6 G/DL (ref 6.4–8.2)
TRIGLYCERIDE, FASTING: 106 MG/DL (ref 0–150)
VITAMIN B-12: 640 PG/ML (ref 211–911)
VITAMIN D 25-HYDROXY: 35.7 NG/ML
VLDLC SERPL CALC-MCNC: 21 MG/DL

## 2020-01-03 PROCEDURE — 82607 VITAMIN B-12: CPT

## 2020-01-03 PROCEDURE — 83036 HEMOGLOBIN GLYCOSYLATED A1C: CPT

## 2020-01-03 PROCEDURE — 36415 COLL VENOUS BLD VENIPUNCTURE: CPT

## 2020-01-03 PROCEDURE — 82306 VITAMIN D 25 HYDROXY: CPT

## 2020-01-03 PROCEDURE — 80053 COMPREHEN METABOLIC PANEL: CPT

## 2020-01-03 PROCEDURE — 82746 ASSAY OF FOLIC ACID SERUM: CPT

## 2020-01-03 PROCEDURE — 80061 LIPID PANEL: CPT

## 2020-01-04 LAB
ESTIMATED AVERAGE GLUCOSE: 151.3 MG/DL
HBA1C MFR BLD: 6.9 %

## 2020-01-08 RX ORDER — DICLOFENAC SODIUM 75 MG/1
TABLET, DELAYED RELEASE ORAL
Qty: 60 TABLET | Refills: 1 | Status: SHIPPED | OUTPATIENT
Start: 2020-01-08 | End: 2020-01-13

## 2020-01-13 ENCOUNTER — OFFICE VISIT (OUTPATIENT)
Dept: FAMILY MEDICINE CLINIC | Age: 75
End: 2020-01-13
Payer: COMMERCIAL

## 2020-01-13 VITALS
DIASTOLIC BLOOD PRESSURE: 80 MMHG | WEIGHT: 243 LBS | SYSTOLIC BLOOD PRESSURE: 124 MMHG | OXYGEN SATURATION: 95 % | HEART RATE: 73 BPM | BODY MASS INDEX: 38.06 KG/M2

## 2020-01-13 PROCEDURE — 96372 THER/PROPH/DIAG INJ SC/IM: CPT | Performed by: FAMILY MEDICINE

## 2020-01-13 PROCEDURE — 99214 OFFICE O/P EST MOD 30 MIN: CPT | Performed by: FAMILY MEDICINE

## 2020-01-13 RX ORDER — ACETAMINOPHEN 160 MG
TABLET,DISINTEGRATING ORAL
COMMUNITY

## 2020-01-13 RX ORDER — LOSARTAN POTASSIUM 50 MG/1
TABLET ORAL
Qty: 90 TABLET | Refills: 1 | Status: SHIPPED | OUTPATIENT
Start: 2020-01-13 | End: 2020-02-19

## 2020-01-13 RX ORDER — ACETAMINOPHEN 500 MG
1500 TABLET ORAL EVERY MORNING
COMMUNITY

## 2020-01-13 RX ORDER — ATENOLOL 50 MG/1
TABLET ORAL
Qty: 90 TABLET | Refills: 3 | Status: SHIPPED | OUTPATIENT
Start: 2020-01-13 | End: 2020-03-25

## 2020-01-13 RX ORDER — FLUOXETINE HYDROCHLORIDE 20 MG/1
20 CAPSULE ORAL DAILY
Qty: 90 CAPSULE | Refills: 1 | Status: SHIPPED | OUTPATIENT
Start: 2020-01-13 | End: 2020-05-19 | Stop reason: SDUPTHER

## 2020-01-13 RX ORDER — DICLOFENAC SODIUM 75 MG/1
TABLET, DELAYED RELEASE ORAL
Qty: 180 TABLET | Refills: 1 | Status: SHIPPED | OUTPATIENT
Start: 2020-01-13 | End: 2020-04-01 | Stop reason: SDUPTHER

## 2020-01-13 RX ORDER — PRAVASTATIN SODIUM 40 MG
40 TABLET ORAL DAILY
Qty: 90 TABLET | Refills: 1 | Status: SHIPPED
Start: 2020-01-13 | End: 2020-03-06 | Stop reason: ALTCHOICE

## 2020-01-13 RX ORDER — CYANOCOBALAMIN 1000 UG/ML
1000 INJECTION INTRAMUSCULAR; SUBCUTANEOUS ONCE
Status: COMPLETED | OUTPATIENT
Start: 2020-01-13 | End: 2020-01-13

## 2020-01-13 RX ORDER — GLIMEPIRIDE 2 MG/1
TABLET ORAL
Qty: 135 TABLET | Refills: 0 | Status: SHIPPED | OUTPATIENT
Start: 2020-01-13 | End: 2020-05-20

## 2020-01-13 RX ADMIN — CYANOCOBALAMIN 1000 MCG: 1000 INJECTION INTRAMUSCULAR; SUBCUTANEOUS at 10:29

## 2020-01-13 ASSESSMENT — ENCOUNTER SYMPTOMS
CONSTIPATION: 0
DIARRHEA: 0
BACK PAIN: 0

## 2020-02-06 ENCOUNTER — OFFICE VISIT (OUTPATIENT)
Dept: PULMONOLOGY | Age: 75
End: 2020-02-06
Payer: COMMERCIAL

## 2020-02-06 ENCOUNTER — HOSPITAL ENCOUNTER (OUTPATIENT)
Age: 75
Discharge: HOME | End: 2020-02-06
Payer: COMMERCIAL

## 2020-02-06 ENCOUNTER — TELEPHONE (OUTPATIENT)
Dept: PULMONOLOGY | Age: 75
End: 2020-02-06

## 2020-02-06 ENCOUNTER — HOSPITAL ENCOUNTER (OUTPATIENT)
Dept: GENERAL RADIOLOGY | Age: 75
Discharge: HOME | End: 2020-02-06
Payer: COMMERCIAL

## 2020-02-06 VITALS
HEIGHT: 67 IN | BODY MASS INDEX: 38.14 KG/M2 | HEART RATE: 83 BPM | OXYGEN SATURATION: 93 % | SYSTOLIC BLOOD PRESSURE: 147 MMHG | WEIGHT: 243 LBS | RESPIRATION RATE: 18 BRPM | DIASTOLIC BLOOD PRESSURE: 83 MMHG

## 2020-02-06 PROCEDURE — 71046 X-RAY EXAM CHEST 2 VIEWS: CPT

## 2020-02-06 PROCEDURE — 99213 OFFICE O/P EST LOW 20 MIN: CPT | Performed by: NURSE PRACTITIONER

## 2020-02-06 RX ORDER — ALBUTEROL SULFATE 90 UG/1
2 AEROSOL, METERED RESPIRATORY (INHALATION) EVERY 6 HOURS PRN
Qty: 1 INHALER | Refills: 6 | Status: SHIPPED | OUTPATIENT
Start: 2020-02-06 | End: 2021-02-05

## 2020-02-06 ASSESSMENT — ENCOUNTER SYMPTOMS
COLOR CHANGE: 0
COUGH: 0
CONSTIPATION: 0
SHORTNESS OF BREATH: 1
ABDOMINAL PAIN: 0

## 2020-02-06 NOTE — PROGRESS NOTES
[facility] Outpatient Follow Up Note    Subjective:   CHIEF COMPLAINT / HPI: Moderate COPD   The patient is 69 y.o. female who presents today for a routine follow up visit related to the above mentioned issues. There is a PMH significant for conditions including HTN, OA and DM. She was last evaluated by Dr. Gilbert Knight in January of 2018 and at that time, pulmonary symptoms were stable. Presently she reports SOB which has been worse than over time. There is also some associated LE pain. She had past PFT which showed mixed restrictive / obstructive changes however these looked mostly restrictive at last look in 2018. She has been compliant with Anoro. She does not require supplemental O2.         Past Medical History:   Diagnosis Date    Anxiety     BCC (basal cell carcinoma of skin)     NBCC     COPD (chronic obstructive pulmonary disease) (HCC)     Hyperlipidemia     Hypertension     Obesity     Osteoarthritis     Type 2 diabetes mellitus ([facility] [address] 2/27/2015    Wears partial dentures      Social History:    Social History     Tobacco Use   Smoking Status Never Smoker   Smokeless Tobacco Never Used     Current Medications:     Current Outpatient Medications on File Prior to Visit   Medication Sig Dispense Refill    Coenzyme Q10 (COQ10 PO) Take by mouth      acetaminophen (APAP EXTRA STRENGTH) 500 MG tablet Take 1,500 mg by mouth every morning      Cholecalciferol (VITAMIN D3) 50 MCG (2000 UT) CAPS Take by mouth      umeclidinium-vilanterol (ANORO ELLIPTA) 62.5-25 MCG/INH AEPB inhaler Inhale 1 puff into the lungs daily 3 each 3    diclofenac (VOLTAREN) 75 MG EC tablet TAKE 1 TABLET TWICE A  tablet 1    atenolol (TENORMIN) 50 MG tablet TAKE 1 TABLET DAILY 90 tablet 3    glimepiride (AMARYL) 2 MG tablet TAKE ONE AND ONE-HALF TABLET BY MOUTH EVERY MORNING 135 tablet 0    FLUoxetine (PROZAC) 20 MG capsule Take 1 capsule by mouth daily 90 capsule 1    losartan (COZAAR) 50 MG tablet TAKE 1 persist despite above     Return in about 4 weeks (around 3/5/2020) for short term follow up of symptoms.  RTC sooner if symptoms persist.    Agustina Mckeon MSN APRN-ACNP CCRN

## 2020-02-11 ENCOUNTER — HOSPITAL ENCOUNTER (OUTPATIENT)
Dept: PULMONOLOGY | Age: 75
Discharge: HOME | End: 2020-02-11
Payer: COMMERCIAL

## 2020-02-11 VITALS — RESPIRATION RATE: 18 BRPM | HEART RATE: 68 BPM | OXYGEN SATURATION: 93 %

## 2020-02-11 LAB
DLCO %PRED: 60 %
DLCO PRED: NORMAL
DLCO/VA %PRED: NORMAL
DLCO/VA PRED: NORMAL
DLCO/VA: NORMAL
DLCO: NORMAL
EXPIRATORY TIME-POST: NORMAL
EXPIRATORY TIME: NORMAL
FEF 25-75% %CHNG: NORMAL
FEF 25-75% %PRED-POST: NORMAL
FEF 25-75% %PRED-PRE: NORMAL
FEF 25-75% PRED: NORMAL
FEF 25-75%-POST: NORMAL
FEF 25-75%-PRE: NORMAL
FEV1 %PRED-POST: 55 %
FEV1 %PRED-PRE: 54 %
FEV1 PRED: NORMAL
FEV1-POST: NORMAL
FEV1-PRE: NORMAL
FEV1/FVC %PRED-POST: NORMAL
FEV1/FVC %PRED-PRE: NORMAL
FEV1/FVC PRED: NORMAL
FEV1/FVC-POST: 74 %
FEV1/FVC-PRE: 73 %
FVC %PRED-POST: NORMAL
FVC %PRED-PRE: NORMAL
FVC PRED: NORMAL
FVC-POST: NORMAL
FVC-PRE: NORMAL
GAW %PRED: NORMAL
GAW PRED: NORMAL
GAW: NORMAL
IC %PRED: NORMAL
IC PRED: NORMAL
IC: NORMAL
MEP: NORMAL
MIP: NORMAL
MVV %PRED-PRE: NORMAL
MVV PRED: NORMAL
MVV-PRE: NORMAL
PEF %PRED-POST: NORMAL
PEF %PRED-PRE: NORMAL
PEF PRED: NORMAL
PEF%CHNG: NORMAL
PEF-POST: NORMAL
PEF-PRE: NORMAL
RAW %PRED: NORMAL
RAW PRED: NORMAL
RAW: NORMAL
RV %PRED: NORMAL
RV PRED: NORMAL
RV: NORMAL
SVC %PRED: NORMAL
SVC PRED: NORMAL
SVC: NORMAL
TLC %PRED: 76 %
TLC PRED: NORMAL
TLC: NORMAL
VA %PRED: NORMAL
VA PRED: NORMAL
VA: NORMAL
VTG %PRED: NORMAL
VTG PRED: NORMAL
VTG: NORMAL

## 2020-02-11 PROCEDURE — 94726 PLETHYSMOGRAPHY LUNG VOLUMES: CPT

## 2020-02-11 PROCEDURE — 94760 N-INVAS EAR/PLS OXIMETRY 1: CPT

## 2020-02-11 PROCEDURE — 94200 LUNG FUNCTION TEST (MBC/MVV): CPT

## 2020-02-11 PROCEDURE — 94729 DIFFUSING CAPACITY: CPT

## 2020-02-11 PROCEDURE — 94060 EVALUATION OF WHEEZING: CPT

## 2020-02-11 PROCEDURE — 6370000000 HC RX 637 (ALT 250 FOR IP): Performed by: INTERNAL MEDICINE

## 2020-02-11 RX ORDER — ALBUTEROL SULFATE 90 UG/1
4 AEROSOL, METERED RESPIRATORY (INHALATION) ONCE
Status: COMPLETED | OUTPATIENT
Start: 2020-02-11 | End: 2020-02-11

## 2020-02-11 RX ADMIN — Medication 4 PUFF: at 15:27

## 2020-02-11 ASSESSMENT — PULMONARY FUNCTION TESTS
FEV1_PERCENT_PREDICTED_POST: 55
FEV1/FVC_PRE: 73
FEV1_PERCENT_PREDICTED_PRE: 54
FEV1/FVC_POST: 74

## 2020-02-14 ENCOUNTER — OFFICE VISIT (OUTPATIENT)
Dept: FAMILY MEDICINE CLINIC | Age: 75
End: 2020-02-14
Payer: COMMERCIAL

## 2020-02-14 VITALS — SYSTOLIC BLOOD PRESSURE: 140 MMHG | DIASTOLIC BLOOD PRESSURE: 80 MMHG | HEART RATE: 74 BPM | OXYGEN SATURATION: 96 %

## 2020-02-14 PROCEDURE — 99213 OFFICE O/P EST LOW 20 MIN: CPT | Performed by: FAMILY MEDICINE

## 2020-02-14 RX ORDER — DULOXETIN HYDROCHLORIDE 30 MG/1
30 CAPSULE, DELAYED RELEASE ORAL DAILY
Qty: 30 CAPSULE | Refills: 5 | Status: SHIPPED
Start: 2020-02-14 | End: 2020-07-14 | Stop reason: SINTOL

## 2020-02-14 RX ORDER — GABAPENTIN 100 MG/1
100 CAPSULE ORAL 3 TIMES DAILY
Qty: 90 CAPSULE | Refills: 5 | Status: SHIPPED | OUTPATIENT
Start: 2020-02-14 | End: 2020-07-08 | Stop reason: SDUPTHER

## 2020-02-14 NOTE — PROGRESS NOTES
SUBJECTIVE:    Ella Munoz is a 69 y.o. female who presents for a follow up visit. Chief Complaint   Patient presents with    Generalized Body Aches     Patient feeling achey all over body. Continued fever blisters in mouth and lips. Increased shortness of breath. C/O all over itching. Ongoing, but worse the past couple weeks. Generalized Body Aches   This is a chronic problem. The current episode started more than 1 year ago. The problem occurs constantly. The problem has been waxing and waning. Associated symptoms include arthralgias and numbness ( LUE). Pertinent negatives include no fever or rash. The symptoms are aggravated by walking. She has tried NSAIDs and rest for the symptoms. The treatment provided mild relief. Leg Pain    There was no injury mechanism (Onset was months ago and gradual. The pain is in both lower extremities and comes on while walking. It is relieved by rest). The pain is present in the left leg and right leg. The quality of the pain is described as aching. The pain is moderate. Associated symptoms include numbness ( LUE). She has tried NSAIDs, acetaminophen and rest for the symptoms. The treatment provided mild relief. Patient's medications, allergies, past medical,surgical, social and family histories were reviewed and updated as appropriate.      Past Medical History:   Diagnosis Date    Anxiety     BCC (basal cell carcinoma of skin)     NBCC     COPD (chronic obstructive pulmonary disease) (HCC)     Hyperlipidemia     Hypertension     Obesity     Osteoarthritis     Type 2 diabetes mellitus ([facility] [address] 2/27/2015    Wears partial dentures      Past Surgical History:   Procedure Laterality Date    CARDIAC CATHETERIZATION      CARPAL TUNNEL RELEASE Right 10/19/2017    CARPAL TUNNEL RELEASE Left 11/02/2017     Left  Carpal Tunnel Release & Left Ulnar Nerve decompression at the Cubital Tunnel     CERVICAL LAMINECTOMY  04/18/2018    POSTERIOR CERVICAL acetaminophen (APAP EXTRA STRENGTH) 500 MG tablet Take 1,500 mg by mouth every morning      Cholecalciferol (VITAMIN D3) 50 MCG (2000 UT) CAPS Take by mouth      umeclidinium-vilanterol (ANORO ELLIPTA) 62.5-25 MCG/INH AEPB inhaler Inhale 1 puff into the lungs daily 3 each 3    diclofenac (VOLTAREN) 75 MG EC tablet TAKE 1 TABLET TWICE A  tablet 1    atenolol (TENORMIN) 50 MG tablet TAKE 1 TABLET DAILY 90 tablet 3    glimepiride (AMARYL) 2 MG tablet TAKE ONE AND ONE-HALF TABLET BY MOUTH EVERY MORNING 135 tablet 0    FLUoxetine (PROZAC) 20 MG capsule Take 1 capsule by mouth daily 90 capsule 1    losartan (COZAAR) 50 MG tablet TAKE 1 TABLET DAILY 90 tablet 1    glucose blood VI test strips (ONE TOUCH TEST STRIPS) strip 1 each by In Vitro route daily. Check FBS and 4 pm  each 3    ONE TOUCH LANCETS MISC Check FBS and 4 pm  each 3    Blood Glucose Monitoring Suppl (ONE TOUCH ULTRA 2) W/DEVICE KIT 1 kit by Does not apply route daily as needed. 1 kit 0    pravastatin (PRAVACHOL) 40 MG tablet Take 1 tablet by mouth daily (Patient not taking: Reported on 2/14/2020) 90 tablet 1     No current facility-administered medications on file prior to visit. Review of Systems   Constitutional: Negative for fever. Musculoskeletal: Positive for arthralgias. Skin: Negative for rash. Neurological: Positive for numbness ( LUE). OBJECTIVE:    BP (!) 140/80   Pulse 74   LMP 03/01/1998   SpO2 96%    Physical Exam  Constitutional:       Appearance: She is well-developed. HENT:      Head: Normocephalic and atraumatic. Right Ear: External ear normal.      Left Ear: External ear normal.      Nose: Nose normal.   Neck:      Musculoskeletal: Normal range of motion and neck supple. Thyroid: No thyromegaly. Vascular: No JVD. Trachea: No tracheal deviation. Cardiovascular:      Rate and Rhythm: Normal rate and regular rhythm. Heart sounds: Normal heart sounds.

## 2020-02-19 ENCOUNTER — HOSPITAL ENCOUNTER (OUTPATIENT)
Age: 75
Discharge: HOME | End: 2020-02-19
Payer: COMMERCIAL

## 2020-02-19 ENCOUNTER — HOSPITAL ENCOUNTER (OUTPATIENT)
Dept: GENERAL RADIOLOGY | Age: 75
Discharge: HOME | End: 2020-02-19
Payer: COMMERCIAL

## 2020-02-19 PROCEDURE — 72110 X-RAY EXAM L-2 SPINE 4/>VWS: CPT

## 2020-02-19 RX ORDER — LOSARTAN POTASSIUM 50 MG/1
TABLET ORAL
Qty: 90 TABLET | Refills: 1 | Status: SHIPPED | OUTPATIENT
Start: 2020-02-19 | End: 2020-07-14 | Stop reason: SDUPTHER

## 2020-02-27 ENCOUNTER — NURSE ONLY (OUTPATIENT)
Dept: FAMILY MEDICINE CLINIC | Age: 75
End: 2020-02-27
Payer: COMMERCIAL

## 2020-02-27 PROCEDURE — 96372 THER/PROPH/DIAG INJ SC/IM: CPT | Performed by: FAMILY MEDICINE

## 2020-02-27 RX ORDER — CYANOCOBALAMIN 1000 UG/ML
1000 INJECTION INTRAMUSCULAR; SUBCUTANEOUS ONCE
Status: COMPLETED | OUTPATIENT
Start: 2020-02-27 | End: 2020-02-27

## 2020-02-27 RX ADMIN — CYANOCOBALAMIN 1000 MCG: 1000 INJECTION INTRAMUSCULAR; SUBCUTANEOUS at 10:01

## 2020-03-03 ENCOUNTER — HOSPITAL ENCOUNTER (OUTPATIENT)
Dept: PHYSICAL THERAPY | Age: 75
Setting detail: THERAPIES SERIES
Discharge: HOME | End: 2020-03-03
Payer: COMMERCIAL

## 2020-03-03 PROCEDURE — 97110 THERAPEUTIC EXERCISES: CPT

## 2020-03-03 PROCEDURE — 97162 PT EVAL MOD COMPLEX 30 MIN: CPT

## 2020-03-03 PROCEDURE — 97530 THERAPEUTIC ACTIVITIES: CPT

## 2020-03-03 NOTE — FLOWSHEET NOTE
Supine TA contraction                                   Manual Intervention (55088)       Prone PA       GISTM/STM       Lumbar Manip       SI Manip       Hip belt mobs       Hip LA distraction                              Pt. Education:  -pt educated on diagnosis, prognosis and expectations for rehab  -all pt questions were answered    Home Exercise Program:  - 3/3: HEP HO provided for SLR x 4 in standing and on mat table; provided aquatic exercises     Therapeutic Exercise and NMR EXR  [] (88501) Provided verbal/tactile cueing for activities related to strengthening, flexibility, endurance, ROM  for improvements in proximal hip and core control with self care, mobility, lifting and ambulation.  [] (75630) Provided verbal/tactile cueing for activities related to improving balance, coordination, kinesthetic sense, posture, motor skill, proprioception  to assist with core control in self care, mobility, lifting, and ambulation.   [] (20670) Therapist is in constant attendance of 2 or more patients providing skilled therapy interventions, but not providing any significant amount of measurable one-on-one time to either patient, for improvements in LE, proximal hip, and core control in self care, mobility, lifting, ambulation and eccentric single leg control.      Therapeutic Activities:    [] (55872 or 04572) Provided verbal/tactile cueing for activities related to improving balance, coordination, kinesthetic sense, posture, motor skill, proprioception and motor activation to allow for proper function  with self care and ADLs  [] (65021) Provided training and instruction to the patient for proper core and proximal hip recruitment and positioning with ambulation re-education     Home Exercise Program:    [x] (37800) Reviewed/Progressed HEP activities related to strengthening, flexibility, endurance, ROM of core, proximal hip and LE for functional self-care, mobility, lifting and ambulation   [] (12234) up visits, this note will serve as a discharge from care along with most recent update on progress.

## 2020-03-03 NOTE — PLAN OF CARE
[address] [address], [address] [place] [address] Drive  Phone: [phone]   Fax: [phone]     Physical Therapy Certification    Dear Referring Practitioner: DENA Lynn CNP,    We had the pleasure of evaluating the following patient for physical therapy services at [address] [place] [address]. A summary of our findings can be found in the initial assessment below. This includes our plan of care. If you have any questions or concerns regarding these findings, please do not hesitate to contact me at the office phone number checked above. Thank you for the referral.       Physician Signature:_______________________________Date:__________________  By signing above (or electronic signature), therapists plan is approved by physician      Patient: Lilli Medeiros   : 1945   MRN: [id number]  Referring Physician: Referring Practitioner: DENA Lynn CNP      Evaluation Date: 3/3/2020      Medical Diagnosis Information:  Diagnosis: LBP (M54.5), Lumbar Spondylosis w/ Radicular symptoms (M47.26), Lumbar DDD (M51.26)   Treatment Diagnosis: decreased endurance and shortness of breath with activity, B LE weakness, LBP that limits mobility                                           Insurance information: PT Insurance Information: [employer] HMO $40 COPAY     Precautions/ Contra-indications: C3-7 cervical fusion; limit extension due to multilevel lumbar retrolisthesis   Latex Allergy:  [x]NO      []YES  Preferred Language for Healthcare:   [x]English       []other:    SUBJECTIVE: Patient stated complaint: her neck gets tired easily, with minimal motion due to previous C3-7 fusion. Patient reports over the past year her legs have started hurting on a daily basis, with low back pain that eliminates with rest or leaning forward onto a table.  She states the pain is primarily with long duration walking, which radiates down into her legs and makes her have to sit down. She reports she has COPD that can limit her breathing as well. She gets off balance due to her loss of strength, and will hold onto railings due to her possible loss of balance and fear of falling. Fear avoidance: I should not do physical activities that (might) make my pain worse   [] True   [x] False     Relevant Medical History: fused C3-7, previous lumbar surgery, COPD for 8 years, DM Type 2, HTN, OA, and anxiety  Functional Outcome: Oswestry: raw score = 18; dysfunction = 36%    Pain Scale: 0/10 seated ; (Worst = 6/10 when walking)  Easing factors: resting, seated, stretch forward, walking with UE support  Provocative factors: long duration walking or upright weight bearing, stair navigation    Type: []Constant   [x]Intermittent  []Radiating []Localized []other:     Numbness/Tingling: L/R hand    Occupation/School: retired    Living Status/Prior Level of Function: Prior to this injury / incident, pt was independent with ADLs and IADLs, housework, and community mobility / social events. X-ray results: There is 1 cm degenerative retrolisthesis of L2 on L3 and minimal grade 1  degenerative retrolisthesis of L3 on L4 measuring approximately 3 mm.  Lumbar  vertebra otherwise demonstrate normal alignment.  No fracture or suspicious  osseous lesion identified.  There is severe disc height loss at L1-L2 and  L2-L3 with hypertrophic endplate spurring.  Moderate disc height loss is seen  posteriorly at L3-L4.  Mild disc height loss is seen at the remainder lumbar  levels.  There is multilevel facet arthrosis, most pronounced in the lower  lumbar spine.  On flexion and extension views there is no evidence of  instability.  Soft tissues are without acute process.       OBJECTIVE:   Palpation: no pain to palpation of spine    Functional Mobility/Transfers: slow and cautious transitions with heavy forward lean during transitions     Posture: increased lumbar lordosis with anterior pelvic tilt in standing     Inspection: surgical incision scar present on lumbar spine    Gait: heel toe gait with slight increase in trunk shifting during stance phase, preference for forward lean onto object or sitting for rest shortly after ambulation     Bandages/Dressings/Incisions: NA    6' walk = 11 laps = 1254 feet (symptoms at 2 minutes, stopped for fwd flexion at 4 min) = 5/10 SOB, 5/10 fatigue in LEs     Dermatomes Normal Abnormal Comments   inguinal area (L1)  x     anterior mid-thigh (L2) x     distal ant thigh/med knee (L3) x     medial lower leg and foot (L4) x     lateral lower leg and foot (L5) x     posterior calf (S1) x     medial calcaneus (S2) x         ROM  Comments   Lumbar Flex 50%    Lumbar Ext 25%      ROM LEFT RIGHT Comments   Lumbar Side Bend 50% 50%    Lumbar Rotation 50% 50%    Hip Flexion [facility] WFL    Hip Abd [facility]    Hip ER [facility]    Hip IR WFL WFL    Hip Extension [facility] WFL    Hamstring Flex Mod Restriction Mod Restriction    Piriformis Mod Restriction Mod Restriction                    Joint mobility: Lumbar spine due to previous surgery   []Normal    [x]Hypo   []Hyper      Strength / Myotomes LEFT RIGHT Comments   Multifidus      Transverse Ab      Hip Flexors (L1-2) 3+ 3+    Hip Abductors 3 3    Hip Extensors 3 3    Hip Internal Rotators 4- 4-    Hip External Rotators 4- 4-    Quads (L2-4) 5 5    Hamstrings  4+ 4+          Neural dynamic tension testing Normal Abnormal Comments   Slump Test  - Degree of knee flexion:       SLR       0-30 x     30-70 x     Femoral nerve (L2-4)          Orthopedic Special Tests:    Normal Abnormal N/A Comments   Toe walk         Heel Walk       Fwd Bend-aberrant or innominate mvmt) x      Trendelenburg  x  Present (B)   Kecarolynns/Molly ACOSTAER/Jaquan x      Hip scour x      SLR x      Crossed SLR x      Supine to sit x      Hip thrust       SI distraction/compression       PA/Spring x      Prone Instability test       Prone knee bend intervention due to being higher risk   TUG score (>12s at risk):     [] Falls education provided, including         ASSESSMENT: Vipul Marina is a 80yo female presenting to outpatient physical therapy due to low back pain, poor endurance, and leg weakness after prolonged walking or standing. Patient demo's significant B LE weakness and difficulty walking long distances (unable to maintain upright walking during 6' walk test). Patient presents with possible neurogenic claudication as prolonged walking causes her symptoms of weakness / fatigue then goes into forward flexion at lumbar spine and symptoms ease off. Continue to monitor response. Patient would benefit from skilled PT to address trunk/LE weakness, low endurance, and functional mobility skills.      Functional Impairments:     [x]Noted lumbar/proximal hip hypomobility   []Noted lumbosacral and/or generalized hypermobility   [x]Decreased Lumbosacral/hip/LE functional ROM   []Decreased core/proximal hip strength and neuromuscular control    [x]Decreased LE functional strength    []Abnormal reflexes/sensation/myotomal/dermatomal deficits  [x]Reduced balance/proprioceptive control    []other:      Functional Activity Limitations (from functional questionnaire and intake)   [x]Reduced ability to tolerate prolonged functional positions   []Reduced ability or difficulty with changes of positions or transfers between positions   [x]Reduced ability to maintain good posture and demonstrate good body mechanics with sitting, bending, and lifting   [x]Reduced ability to sleep   [] Reduced ability or tolerance with driving and/or computer work   [x]Reduced ability to perform lifting, reaching, carrying tasks   [x]Reduced ability to squat   []Reduced ability to forward bend   [x]Reduced ability to ambulate prolonged functional periods/distances/surfaces   []Reduced ability to ascend/descend stairs   []other:       Participation Restrictions   [x]Reduced participation in self care and/or uncomplicated characteristics   [x] evolving clinical presentation with changing characteristics  [] unstable and unpredictable characteristics;   [x] Clinical decision making of [] low, [x] moderate, [] high complexity using standardized patient assessment instrument and/or measurable assessment of functional outcome. [] EVAL (LOW) 39897 (typically 15 minutes face-to-face)  [x] EVAL (MOD) 82062 (typically 30 minutes face-to-face)  [] EVAL (HIGH) 14054 (typically 45 minutes face-to-face)  [] RE-EVAL     PLAN: Begin PT focusing on: proximal hip mobilizations, LB mobs, LB core activation, proximal hip activation, and HEP    Frequency/Duration:  2 days per week for 4 Weeks:  Interventions:  [x]  Therapeutic exercise including: strength training, ROM, for LE, Glutes and core   [x]  NMR activation and proprioception for glutes , LE and Core   [x]  Manual therapy as indicated for Hip complex, LE and spine to include: Dry Needling/IASTM, STM, PROM, Gr I-IV mobilizations, manipulation. [x]  Modalities as needed that may include: thermal agents, E-stim, Biofeedback, US, iontophoresis as indicated  [x]  Patient education on joint protection, postural re-education, activity modification, progression of HEP. HEP instruction: Written HEP instructions provided and reviewed     GOALS:  Patient stated goal: walk longer, decrease fatigue   [] Progressing: [] Met: [] Not Met: [] Adjusted    Therapist goals for Patient:   Short Term Goals: To be achieved in: 2 weeks  1. Independent in HEP and progression per patient tolerance, in order to prevent re-injury. [] Progressing: [] Met: [] Not Met: [] Adjusted  2. Patient will have a decrease in pain to facilitate improvement in movement, function, and ADLs as indicated by Functional Deficits. [] Progressing: [] Met: [] Not Met: [] Adjusted    Long Term Goals: To be achieved in: 4 weeks  1.  Disability index score of 15% or less for the BETHANY to assist with reaching prior level of function. [] Progressing: [] Met: [] Not Met: [] Adjusted  2. Patient will demonstrate increased AROM to WNL, good LS mobility, good hip ROM to allow for proper joint functioning as indicated by patients Functional Deficits. [] Progressing: [] Met: [] Not Met: [] Adjusted  3. Patient will demonstrate an increase in B LE Strength to 4/5 good proximal hip and core activation to allow for proper functional mobility as indicated by patients Functional Deficits. [] Progressing: [] Met: [] Not Met: [] Adjusted  4. Patient will return to functional activities including prolonged walking for 6 minutes without increased symptoms or restriction.    [] Progressing: [] Met: [] Not Met: [] Adjusted    Electronically signed by:  Pranay Barrett, PT, DPT

## 2020-03-05 ENCOUNTER — HOSPITAL ENCOUNTER (OUTPATIENT)
Dept: PHYSICAL THERAPY | Age: 75
Setting detail: THERAPIES SERIES
Discharge: HOME | End: 2020-03-05
Payer: COMMERCIAL

## 2020-03-05 PROCEDURE — 97110 THERAPEUTIC EXERCISES: CPT

## 2020-03-05 NOTE — FLOWSHEET NOTE
[address] [facility]  Phone: [phone]   Fax: [phone]    Physical Therapy Treatment Note/ Progress Report:     Date:  3/5/2020    Patient Name:  Lorraine Toney    :  1945  MRN: [id number]  Restrictions/Precautions:    Medical/Treatment Diagnosis Information:  Diagnosis: LBP (M54.5), Lumbar Spondylosis w/ Radicular symptoms (M47.26), Lumbar DDD (M51.26)  Treatment Diagnosis: decreased endurance and shortness of breath with activity, B LE weakness, LBP that limits mobility   Insurance/Certification information:  PT Insurance Information: [employer] HMO ([employer]) $40 COPAY  Physician Information:  Referring Practitioner: DENA Durant CNP  Plan of care signed (Y/N): [x]  Yes []  No    Date of Patient follow up with Physician: 3/13/2020     Progress Report: []  Yes  [x]  No     Date Range for reporting period:  Beginning: 3/3/2020  Ending:     Progress report due (10 Rx/or 30 days whichever is less):  or 9079     Recertification due (POC duration/ or 90 days whichever is less): 4 weeks     Visit # Insurance Allowable Auth required? Date Range   2 [employer] []  Yes  [x]  No      Latex Allergy:  [x]NO      []YES  Preferred Language for Healthcare:   [x]English       []other:    Functional Scale:        Date assessed:  BETHANY: raw score = 18; dysfunction = 36%    3/3/2020    Pain level:  0/10     SUBJECTIVE:  Patient reports she is doing well today, states symptoms are the same and only come on with prolonged walking.      OBJECTIVE: See eval   Observation:    Test measurements:      RESTRICTIONS/PRECAUTIONS: C3-7 fusion, multilevel lumbar retrolisthesis     Exercises/Interventions:     Therapeutic Exercise (69720) Resistance / level Sets / Seconds Reps Notes / Cues   Bike 2 5'     IB - Gastroc Stretch  30\" 3 B    Step - HS Stretch  30\" 3 B    BKTC for flexion of spine  10 10    SLR Flexion 2# 2 10 B    SLR Abduction  2# 2 10 B    Clamshells blue 2 10 B goals progression <30days   [] Goals require adjustment due to lack of progress  [] Patient is not progressing as expected and requires additional follow up with physician  [] Other    Persisting Functional Limitations/Impairments:  []Sitting [x]Standing   [x]Walking [x]Squatting/bending    [x]Stairs []ADL's    [x]Transfers []Reaching  []Housework []Job related tasks  []Driving []Sports/Recreation   []Sleeping []Other:    ASSESSMENT:  Patient does well with progression of exercises listed above. PT showed patient x-ray image to see the differences in bony presentation and alignment compared to normal spine. Discussed possible diagnosis of neurogenic claudication for symptoms with prolonged walking and relief with lumbar flexion. Discussed need for continued strengthening of trunk/LEs and forward flexion for symptoms management. HEP initiated and provided to patient as described above. Patient continues to benefit from skilled PT. Treatment/Activity Tolerance:  [x] Patient able to complete tx  [] Patient limited by fatique  [] Patient limited by pain  [] Patient limited by other medical complications  [] Other:     Prognosis: [] Good [x] Fair  [] Poor    Patient Requires Follow-up: [x] Yes  [] No    PLAN: See eval. PT 2x / week for 4 weeks. [x] Continue per plan of care [] Alter current plan (see comments)  [] Plan of care initiated [] Hold pending MD visit [] Discharge    Electronically signed by: Magdalena Ernandez PT, DPT      Note: If patient does not return for scheduled/ recommended follow up visits, this note will serve as a discharge from care along with most recent update on progress.

## 2020-03-06 ENCOUNTER — OFFICE VISIT (OUTPATIENT)
Dept: PULMONOLOGY | Age: 75
End: 2020-03-06
Payer: COMMERCIAL

## 2020-03-06 ENCOUNTER — TELEPHONE (OUTPATIENT)
Dept: PULMONOLOGY | Age: 75
End: 2020-03-06

## 2020-03-06 VITALS — DIASTOLIC BLOOD PRESSURE: 81 MMHG | SYSTOLIC BLOOD PRESSURE: 146 MMHG | HEART RATE: 85 BPM | OXYGEN SATURATION: 92 %

## 2020-03-06 PROBLEM — J44.9 COPD, SEVERE (HCC): Status: ACTIVE | Noted: 2020-03-06

## 2020-03-06 PROBLEM — J96.11 CHRONIC HYPOXEMIC RESPIRATORY FAILURE (HCC): Status: ACTIVE | Noted: 2020-03-06

## 2020-03-06 PROBLEM — J98.4 RESTRICTIVE LUNG DISEASE: Status: ACTIVE | Noted: 2020-03-06

## 2020-03-06 PROCEDURE — 99214 OFFICE O/P EST MOD 30 MIN: CPT | Performed by: INTERNAL MEDICINE

## 2020-03-06 RX ORDER — IPRATROPIUM BROMIDE AND ALBUTEROL SULFATE 2.5; .5 MG/3ML; MG/3ML
1 SOLUTION RESPIRATORY (INHALATION) EVERY 4 HOURS
Qty: 360 ML | Refills: 11 | Status: SHIPPED | OUTPATIENT
Start: 2020-03-06

## 2020-03-06 NOTE — TELEPHONE ENCOUNTER
OK? PROGRESS NOTE



DATE OF SERVICE:

09/05/2018



PRESENTING COMPLAINT:

Short of breath.



INTERVAL HISTORY:

This is a patient with ovarian cancer getting chemotherapy who presented with atrial

flutter with rapid ventricular rate, was on an IV Cardizem drip until earlier today and

then discontinued.  The patient is making good urine.  Eliquis was discontinued.

Patient's breathing is a bit better, sitting up on a chair.



REVIEW OF SYSTEMS:

Done for constitutional, cardiovascular, GI, pulmonary; relevant findings as above.



CURRENT MEDICATIONS:

Reviewed. They include digoxin being started today. IV Cardizem was discontinued this

afternoon. Lasix 40 mg IV q.8, Lopressor 50 mg 3 times a day, new dose.



PHYSICAL EXAMINATION:

Temperature 97.6, pulse 106, respiration 14, blood pressure 144/98, pulse ox 91% on

room air.

GENERAL APPEARANCE:  Sitting up on a chair, awake.

EYES: Pupils equal. Conjunctivae normal.

HEENT: External appearance of nose and ears normal. Oral cavity normal.

NECK: JVD unable to assess.  Mass not palpable.

RESPIRATORY: Effort normal.

LUNGS: Fair air entry.

CARDIOVASCULAR:  Heart sounds irregular. Some edema.

ABDOMEN:  Distended, soft.  Liver and spleen not palpable.

PSYCHIATRY:  Alert and oriented x3. Mood and affect normal.



INVESTIGATIONS:

Telemetry shows heart rate just above 100.



ASSESSMENT:

1. Persistent atrial flutter/fibrillation with a rapid ventricular rate, still

    somewhat uncontrolled.

2. Acute congestive heart failure exacerbation, possibly precipitated by atrial

    fibrillation, slow to respond.

3. Essential hypertension.

4. Hyperlipidemia.

5. Obesity; body mass index 37.5.

6. Breast cancer, getting chemotherapy.

7. Pancytopenia secondary to chemotherapy.



PLAN:

Continue patient on IV Lasix. Lopressor was increased to 50 mg 3 times a day.  Digoxin

was started.  Discussed with Dr. ROOSEVELT Preston.  Eliquis has been held.  Possible

thoracentesis.  Patient requests Dr. Gloria.  Two-D echo pending.





MMODL / IJN: [id number] / Job#: [id number]

## 2020-03-06 NOTE — PROGRESS NOTES
[facility]  Progress Note  Adeline Cobos MD       Ellis Wang   YOB: 1945    Date of Visit:  3/6/2020    Assessment/Plan:  1. SOB (shortness of breath)  SOB with exertion is worse  I have reviewed laboratories, medical records and images for this visit  PFT is worse by 20%  CXR is clear. .    2. COPD, moderate ([address]  PFT 2/20:  INTERPRETATION:  Spirometry reveals decreased FVC at 1.79 liters, which  is 55% predicted. FEV1 is decreased at 1.32 liters, which is 54%  predicted. FEV1/FVC ratio is normal.  Expiratory flow rates are  reduced. There is no significant change after inhaled bronchodilators. Lung volumes reveal decreased total lung capacity at 76% predicted. Vital capacity is decreased at 64% predicted. Residual volume is  normal.  Diffusion capacity is decreased at 60% predicted. In  comparison to a study from 02/2018, FVC has decreased by 21%, FEV1 has  decreased by 22% and diffusion capacity has decreased by 23%.     IMPRESSION:  Moderate restrictive lung disease. There has been  worsening in air flow and diffusion capacity since the preceding study. She is worse compared to 2018  Anoro ==> Trelegy  Albuterol in advance of activity  She would benefit from nebulizer  Duoneb BID    3. Restrictive Lung Disease  Likely 2/2 to obesity    4. Chronic hypoxemic respiratory failure  Oxygen saturation is in the 90s at rest  She falls quickly to 87% with minimal exertion on flat ground  She will benefit from supplemental oxygen with exertion and sleep. She would also benefit from portable oxygen concentrator. She is mobile within her home and travels quite a bit as well. FOLLOW UP: 6 months      Chief Complaint   Patient presents with    Shortness of Breath     1 month f.u.  Had PFT done Doing better then last visit       HPI  The patient presents with a chief complaint of moderate shortness of breath related to mild COPD of many years duration. He has mild associated cough. Exertion is a modifying factor. She is worse. She is having trouble walking. She gets SOB and her legs hurt which makes her stop around one [place] block in distance. She has had bad back problems and thinks that may be part of it. She is scheduled to see Dr Quan Royal to investigate any vascular component. She feels better than when she saw Edyta Sands a month ago. No Chest pain, Nausea or vomiting reported        Review of Systems  As documented in HPI     Physical Exam:  Well developed, well nourished  Alert and oriented  Sclera is clear  No cervical adenopathy  No JVD. Chest examination is clear. Cardiac examination reveals regular rate and rhythm without murmur, gallop or rub. The abdomen is soft, nontender and nondistended. There is no clubbing, cyanosis or edema of the extremities. There is no obvious skin rash. No focal neuro deficicts  Normal mood and affect    Allergies   Allergen Reactions    Ace Inhibitors      cough    Actifed Cold-Allergy [Chlorpheniramine-Phenylephrine]     Cephalexin Other (See Comments)     Yeast infection if taken over a long period of time    Codeine Other (See Comments)     dizziness    Erythromycin Base      Patient denies on 2/28/2013    Lisinopril Other (See Comments)     Cough     Lovastatin Other (See Comments)     Myalgias; mouth sores; ELEV LFT    Metformin And Related      Diarrhea (time release is better but insurance doesn't cover), leg cramps, fatigue.  Sudafed [Pseudoephedrine Hcl] Other (See Comments)     Shakes     Trinalin [Azatadine-Pseudoephedrine]      Prior to Visit Medications    Medication Sig Taking?  Authorizing Provider   losartan (COZAAR) 50 MG tablet TAKE 1 TABLET BY MOUTH EVERY DAY Yes Clarita Rainey., MD   DULoxetine (CYMBALTA) 30 MG extended release capsule Take 1 capsule by mouth daily Yes Clarita Rainey., MD   gabapentin (NEURONTIN) 100 MG capsule Take 1 capsule by mouth 3 times daily for 30 days. Intended supply: 30 days Yes Dalia Mao MD   albuterol sulfate HFA (PROAIR HFA) 108 (90 Base) MCG/ACT inhaler Inhale 2 puffs into the lungs every 6 hours as needed for Wheezing Yes DENA Garcia CNP   Coenzyme Q10 (COQ10 PO) Take by mouth Yes Historical Provider, MD   acetaminophen (APAP EXTRA STRENGTH) 500 MG tablet Take 1,500 mg by mouth every morning Yes Historical Provider, MD   Cholecalciferol (VITAMIN D3) 50 MCG (2000 UT) CAPS Take by mouth Yes Historical Provider, MD   umeclidinium-vilanterol (ANORO ELLIPTA) 62.5-25 MCG/INH AEPB inhaler Inhale 1 puff into the lungs daily Yes Dalia Mao, MD   diclofenac (VOLTAREN) 75 MG EC tablet TAKE 1 TABLET TWICE A DAY Yes Dalia Mao MD   atenolol (TENORMIN) 50 MG tablet TAKE 1 TABLET DAILY Yes Dalia Mao MD   glimepiride (AMARYL) 2 MG tablet TAKE ONE AND ONE-HALF TABLET BY MOUTH EVERY MORNING Yes Dalia Mao MD   FLUoxetine (PROZAC) 20 MG capsule Take 1 capsule by mouth daily Yes Dalia Mao MD   glucose blood VI test strips (ONE TOUCH TEST STRIPS) strip 1 each by In Vitro route daily. Check FBS and 4 pm BS Yes Tim Peña MD   ONE TOUCH LANCETS MISC Check FBS and 4 pm BS Yes Tim Peña MD   Blood Glucose Monitoring Suppl (ONE TOUCH ULTRA 2) W/DEVICE KIT 1 kit by Does not apply route daily as needed. Yes Tim Peña MD       Vitals:    03/06/20 0856   BP: (!) 151/92   Pulse: 85   SpO2: 92%     There is no height or weight on file to calculate BMI.      Wt Readings from Last 3 Encounters:   02/06/20 243 lb (110.2 kg)   01/13/20 243 lb (110.2 kg)   10/22/19 244 lb (110.7 kg)     BP Readings from Last 3 Encounters:   03/06/20 (!) 151/92   02/14/20 (!) 140/80   02/06/20 (!) 147/83        Social History     Tobacco Use   Smoking Status Never Smoker   Smokeless Tobacco Never Used

## 2020-03-09 ENCOUNTER — HOSPITAL ENCOUNTER (OUTPATIENT)
Dept: PHYSICAL THERAPY | Age: 75
Setting detail: THERAPIES SERIES
Discharge: HOME | End: 2020-03-09
Payer: COMMERCIAL

## 2020-03-12 ENCOUNTER — HOSPITAL ENCOUNTER (OUTPATIENT)
Dept: PHYSICAL THERAPY | Age: 75
Setting detail: THERAPIES SERIES
Discharge: HOME | End: 2020-03-12
Payer: COMMERCIAL

## 2020-03-16 ENCOUNTER — HOSPITAL ENCOUNTER (OUTPATIENT)
Age: 75
Discharge: HOME | End: 2020-03-16
Payer: COMMERCIAL

## 2020-03-16 ENCOUNTER — APPOINTMENT (OUTPATIENT)
Dept: PHYSICAL THERAPY | Age: 75
End: 2020-03-16
Payer: COMMERCIAL

## 2020-03-16 LAB
A/G RATIO: 1.2 (ref 1.1–2.2)
ALBUMIN SERPL-MCNC: 4.1 G/DL (ref 3.4–5)
ALP BLD-CCNC: 136 U/L (ref 40–129)
ALT SERPL-CCNC: 22 U/L (ref 10–40)
ANION GAP SERPL CALCULATED.3IONS-SCNC: 17 MMOL/L (ref 3–16)
AST SERPL-CCNC: 19 U/L (ref 15–37)
BASOPHILS ABSOLUTE: 0.1 K/UL (ref 0–0.2)
BASOPHILS RELATIVE PERCENT: 0.8 %
BILIRUB SERPL-MCNC: 0.3 MG/DL (ref 0–1)
BUN BLDV-MCNC: 14 MG/DL (ref 7–20)
CALCIUM SERPL-MCNC: 10.2 MG/DL (ref 8.3–10.6)
CHLORIDE BLD-SCNC: 96 MMOL/L (ref 99–110)
CHOLESTEROL, FASTING: 278 MG/DL (ref 0–199)
CO2: 26 MMOL/L (ref 21–32)
CREAT SERPL-MCNC: 0.7 MG/DL (ref 0.6–1.2)
EOSINOPHILS ABSOLUTE: 0.3 K/UL (ref 0–0.6)
EOSINOPHILS RELATIVE PERCENT: 2.8 %
GFR AFRICAN AMERICAN: >60
GFR NON-AFRICAN AMERICAN: >60
GLOBULIN: 3.5 G/DL
GLUCOSE FASTING: 144 MG/DL (ref 70–99)
HCT VFR BLD CALC: 41.3 % (ref 36–48)
HDLC SERPL-MCNC: 68 MG/DL (ref 40–60)
HEMOGLOBIN: 13.5 G/DL (ref 12–16)
LDL CHOLESTEROL CALCULATED: 186 MG/DL
LYMPHOCYTES ABSOLUTE: 1.3 K/UL (ref 1–5.1)
LYMPHOCYTES RELATIVE PERCENT: 13 %
MCH RBC QN AUTO: 30.9 PG (ref 26–34)
MCHC RBC AUTO-ENTMCNC: 32.8 G/DL (ref 31–36)
MCV RBC AUTO: 94.2 FL (ref 80–100)
MONOCYTES ABSOLUTE: 0.7 K/UL (ref 0–1.3)
MONOCYTES RELATIVE PERCENT: 7.7 %
NEUTROPHILS ABSOLUTE: 7.3 K/UL (ref 1.7–7.7)
NEUTROPHILS RELATIVE PERCENT: 75.7 %
PDW BLD-RTO: 14 % (ref 12.4–15.4)
PLATELET # BLD: 345 K/UL (ref 135–450)
PMV BLD AUTO: 9.6 FL (ref 5–10.5)
POTASSIUM SERPL-SCNC: 4.8 MMOL/L (ref 3.5–5.1)
RBC # BLD: 4.39 M/UL (ref 4–5.2)
SODIUM BLD-SCNC: 139 MMOL/L (ref 136–145)
TOTAL PROTEIN: 7.6 G/DL (ref 6.4–8.2)
TRIGLYCERIDE, FASTING: 118 MG/DL (ref 0–150)
TSH REFLEX: 2.73 UIU/ML (ref 0.27–4.2)
VLDLC SERPL CALC-MCNC: 24 MG/DL
WBC # BLD: 9.7 K/UL (ref 4–11)

## 2020-03-16 PROCEDURE — 84443 ASSAY THYROID STIM HORMONE: CPT

## 2020-03-16 PROCEDURE — 80061 LIPID PANEL: CPT

## 2020-03-16 PROCEDURE — 36415 COLL VENOUS BLD VENIPUNCTURE: CPT

## 2020-03-16 PROCEDURE — 80053 COMPREHEN METABOLIC PANEL: CPT

## 2020-03-16 PROCEDURE — 83036 HEMOGLOBIN GLYCOSYLATED A1C: CPT

## 2020-03-16 PROCEDURE — 85025 COMPLETE CBC W/AUTO DIFF WBC: CPT

## 2020-03-17 ENCOUNTER — TELEPHONE (OUTPATIENT)
Dept: PULMONOLOGY | Age: 75
End: 2020-03-17

## 2020-03-17 ENCOUNTER — OFFICE VISIT (OUTPATIENT)
Dept: VASCULAR SURGERY | Age: 75
End: 2020-03-17
Payer: COMMERCIAL

## 2020-03-17 VITALS
WEIGHT: 245 LBS | HEIGHT: 67 IN | DIASTOLIC BLOOD PRESSURE: 78 MMHG | BODY MASS INDEX: 38.45 KG/M2 | SYSTOLIC BLOOD PRESSURE: 138 MMHG

## 2020-03-17 LAB
ESTIMATED AVERAGE GLUCOSE: 171.4 MG/DL
HBA1C MFR BLD: 7.6 %

## 2020-03-17 PROCEDURE — 99203 OFFICE O/P NEW LOW 30 MIN: CPT | Performed by: SURGERY

## 2020-03-17 NOTE — PROGRESS NOTES
[facility])  Consultation/History & Physical    Date of Admission:  (Not on file)  Date of Consultation:  3/17/2020    PCP:  Juan José King MD     Chief Complaint:  No chief complaint on file. History of Present Illness:  Anju Theodore is a 69 y.o. female who presents with history of COPD, type 2 diabetes, hypertension and hyperlipidemia in referral from her primary care physician, Dr. Elham Moralez for lower extremity discomfort. He states for the better part of the last year. She's had discomfort in both legs as well as shortness of breath with activity. She states she gets relief when she bends over. Denies any pain at rest.  Additionally, she complains of pain in her arms and neck. She states she is scheduled for an MRI this coming week. PMH:   has a past medical history of Anxiety, BCC (basal cell carcinoma of skin), COPD (chronic obstructive pulmonary disease) ([address] Hyperlipidemia, Hypertension, Obesity, Osteoarthritis, Type 2 diabetes mellitus ([address] and Wears partial dentures. PSH:   has a past surgical history that includes Tonsillectomy; Spine surgery (02/07/2013); Colonoscopy; Dilation and curettage of uterus (N/A, 05/08/2015); Cardiac catheterization; Knee Arthroplasty (Right, 11/03/2015); Total knee arthroplasty (Left, [id number]); Carpal tunnel release (Right, 10/19/2017); Ulnar tunnel release (Right, 10/19/2017); Carpal tunnel release (Left, 11/02/2017); cervical laminectomy (04/18/2018); and Colonoscopy (N/A, 12/6/2018). Allergies:     Allergies   Allergen Reactions    Ace Inhibitors      cough    Actifed Cold-Allergy [Chlorpheniramine-Phenylephrine]     Cephalexin Other (See Comments)     Yeast infection if taken over a long period of time    Codeine Other (See Comments)     dizziness    Erythromycin Base      Patient denies on 2/28/2013    Lisinopril Other (See Comments)     Cough     Lovastatin Other (See Comments)     Myalgias; mouth sores; ELEV LFT    Metformin Does not apply route daily as needed. 1 kit 0    gabapentin (NEURONTIN) 100 MG capsule Take 1 capsule by mouth 3 times daily for 30 days. Intended supply: 30 days 90 capsule 5     No current facility-administered medications for this visit. Social History:       Social History     Socioeconomic History    Marital status:      Spouse name: None    Number of children: None    Years of education: None    Highest education level: None   Occupational History    Occupation: retired   Social Needs    Financial resource strain: None    Food insecurity     Worry: None     Inability: None    Transportation needs     Medical: None     Non-medical: None   Tobacco Use    Smoking status: Never Smoker    Smokeless tobacco: Never Used   Substance and Sexual Activity    Alcohol use: Yes     Alcohol/week: 0.0 standard drinks     Comment: soc    Drug use: No    Sexual activity: Yes     Partners: Male   Lifestyle    Physical activity     Days per week: None     Minutes per session: None    Stress: None   Relationships    Social connections     Talks on phone: None     Gets together: None     Attends [employer] service: None     Active member of club or organization: None     Attends meetings of clubs or organizations: None     Relationship status: None    Intimate partner violence     Fear of current or ex partner: None     Emotionally abused: None     Physically abused: None     Forced sexual activity: None   Other Topics Concern    None   Social History Narrative    None       Family Histroy:      Family History   Problem Relation Age of Onset    Heart Disease Mother         pacemaker    Cancer Mother         colon    COPD Father     Heart Disease Father     Colon Cancer Maternal Grandmother     Heart Disease Maternal Grandfather     Stroke Maternal Grandfather        Review of Systems:  A 14 point review of systems was completed.  Pertinent positives identified in the HPI, all other review of

## 2020-03-17 NOTE — TELEPHONE ENCOUNTER
Called [facility], POC was picked-up by Pt today. Called Pt, they confirmed everything was delivered for them and they are ready to go.

## 2020-03-18 ENCOUNTER — TELEPHONE (OUTPATIENT)
Dept: PULMONOLOGY | Age: 75
End: 2020-03-18

## 2020-03-18 ENCOUNTER — HOSPITAL ENCOUNTER (OUTPATIENT)
Dept: MRI IMAGING | Age: 75
Discharge: HOME | End: 2020-03-18
Payer: COMMERCIAL

## 2020-03-18 PROCEDURE — A9577 INJ MULTIHANCE: HCPCS | Performed by: NURSE PRACTITIONER

## 2020-03-18 PROCEDURE — 6360000004 HC RX CONTRAST MEDICATION: Performed by: NURSE PRACTITIONER

## 2020-03-18 PROCEDURE — 2580000003 HC RX 258: Performed by: NURSE PRACTITIONER

## 2020-03-18 PROCEDURE — 72158 MRI LUMBAR SPINE W/O & W/DYE: CPT

## 2020-03-18 RX ORDER — SODIUM CHLORIDE 0.9 % (FLUSH) 0.9 %
10 SYRINGE (ML) INJECTION
Status: COMPLETED | OUTPATIENT
Start: 2020-03-18 | End: 2020-03-18

## 2020-03-18 RX ORDER — FLUTICASONE FUROATE, UMECLIDINIUM BROMIDE AND VILANTEROL TRIFENATATE 100; 62.5; 25 UG/1; UG/1; UG/1
1 POWDER RESPIRATORY (INHALATION) DAILY
Qty: 3 EACH | Refills: 3 | Status: SHIPPED | OUTPATIENT
Start: 2020-03-18 | End: 2020-07-14

## 2020-03-18 RX ADMIN — Medication 10 ML: at 16:07

## 2020-03-18 RX ADMIN — GADOBENATE DIMEGLUMINE 20 ML: 529 INJECTION, SOLUTION INTRAVENOUS at 16:07

## 2020-03-18 NOTE — TELEPHONE ENCOUNTER
Pt's  called in asking if we can send a 90-day supply of Trelegy to [facility] Mail order. Angelica Mcgregor stated it will be about $40 cheaper for them this way. Angelica Mcgregor stated Pt just started today her last sample of the Trelegy and it usually takes about 4-5 days for them to receive their medication through the mail.      Angelica Mcgregor # [phone]

## 2020-03-19 ENCOUNTER — APPOINTMENT (OUTPATIENT)
Dept: PHYSICAL THERAPY | Age: 75
End: 2020-03-19
Payer: COMMERCIAL

## 2020-03-23 ENCOUNTER — APPOINTMENT (OUTPATIENT)
Dept: PHYSICAL THERAPY | Age: 75
End: 2020-03-23
Payer: COMMERCIAL

## 2020-03-25 RX ORDER — ATENOLOL 50 MG/1
TABLET ORAL
Qty: 90 TABLET | Refills: 1 | Status: SHIPPED | OUTPATIENT
Start: 2020-03-25 | End: 2020-07-14 | Stop reason: SDUPTHER

## 2020-03-26 ENCOUNTER — APPOINTMENT (OUTPATIENT)
Dept: PHYSICAL THERAPY | Age: 75
End: 2020-03-26
Payer: COMMERCIAL

## 2020-03-27 ENCOUNTER — TELEPHONE (OUTPATIENT)
Dept: FAMILY MEDICINE CLINIC | Age: 75
End: 2020-03-27

## 2020-03-27 NOTE — TELEPHONE ENCOUNTER
Patient had an MRI of her lumbar and states they found something on her adrenal gland and would like for you to take a look at the results and see what you think her next step needs to be . Please advise .

## 2020-03-30 ENCOUNTER — APPOINTMENT (OUTPATIENT)
Dept: PHYSICAL THERAPY | Age: 75
End: 2020-03-30
Payer: COMMERCIAL

## 2020-04-01 RX ORDER — DICLOFENAC SODIUM 75 MG/1
TABLET, DELAYED RELEASE ORAL
Qty: 180 TABLET | Refills: 1 | Status: SHIPPED | OUTPATIENT
Start: 2020-04-01 | End: 2020-07-14 | Stop reason: SDUPTHER

## 2020-04-03 ENCOUNTER — HOSPITAL ENCOUNTER (OUTPATIENT)
Dept: MRI IMAGING | Age: 75
Discharge: HOME | End: 2020-04-03
Payer: COMMERCIAL

## 2020-04-03 PROCEDURE — A9577 INJ MULTIHANCE: HCPCS | Performed by: FAMILY MEDICINE

## 2020-04-03 PROCEDURE — 6360000004 HC RX CONTRAST MEDICATION: Performed by: FAMILY MEDICINE

## 2020-04-03 PROCEDURE — 2580000003 HC RX 258: Performed by: FAMILY MEDICINE

## 2020-04-03 PROCEDURE — 74183 MRI ABD W/O CNTR FLWD CNTR: CPT

## 2020-04-03 RX ORDER — SODIUM CHLORIDE 9 MG/ML
INJECTION, SOLUTION INTRAVENOUS ONCE
Status: COMPLETED | OUTPATIENT
Start: 2020-04-03 | End: 2020-04-03

## 2020-04-03 RX ADMIN — GADOBENATE DIMEGLUMINE 20 ML: 529 INJECTION, SOLUTION INTRAVENOUS at 10:10

## 2020-04-03 RX ADMIN — SODIUM CHLORIDE: 9 INJECTION, SOLUTION INTRAVENOUS at 10:10

## 2020-05-08 ENCOUNTER — HOSPITAL ENCOUNTER (OUTPATIENT)
Dept: VASCULAR LAB | Age: 75
Discharge: HOME | End: 2020-05-08
Payer: COMMERCIAL

## 2020-05-08 PROCEDURE — 93923 UPR/LXTR ART STDY 3+ LVLS: CPT

## 2020-05-16 NOTE — TELEPHONE ENCOUNTER
Patient requesting a medication refill.   Medication: FLUoxetine (PROZAC) 20 MG capsule  Pharmacy: [facility] [id number], [address] [facility]  Last office visit: 4/16/20  Next office visit: 7/14/2020

## 2020-05-18 ENCOUNTER — TELEPHONE (OUTPATIENT)
Dept: VASCULAR SURGERY | Age: 75
End: 2020-05-18

## 2020-05-18 NOTE — TELEPHONE ENCOUNTER
Discussed results of noninvasive testing of bilateral lower extremities which shows no significant arterial insufficiency in BLEs. No further vascular testing needed at this time.     Electronically signed by DENA Gonsalez CNP on 5/18/2020 at 1:07 PM

## 2020-05-19 RX ORDER — FLUOXETINE HYDROCHLORIDE 20 MG/1
20 CAPSULE ORAL DAILY
Qty: 90 CAPSULE | Refills: 1 | Status: SHIPPED | OUTPATIENT
Start: 2020-05-19 | End: 2020-05-20 | Stop reason: SDUPTHER

## 2020-05-20 RX ORDER — FLUOXETINE HYDROCHLORIDE 20 MG/1
20 CAPSULE ORAL DAILY
Qty: 90 CAPSULE | Refills: 1 | Status: SHIPPED | OUTPATIENT
Start: 2020-05-20

## 2020-05-20 RX ORDER — GLIMEPIRIDE 2 MG/1
TABLET ORAL
Qty: 135 TABLET | Refills: 1 | Status: SHIPPED | OUTPATIENT
Start: 2020-05-20 | End: 2020-07-14 | Stop reason: SDUPTHER

## 2020-06-15 ENCOUNTER — OFFICE VISIT (OUTPATIENT)
Dept: DERMATOLOGY | Age: 75
End: 2020-06-15
Payer: COMMERCIAL

## 2020-06-15 VITALS — TEMPERATURE: 97.3 F

## 2020-06-15 PROCEDURE — 99213 OFFICE O/P EST LOW 20 MIN: CPT | Performed by: DERMATOLOGY

## 2020-06-15 PROCEDURE — 11102 TANGNTL BX SKIN SINGLE LES: CPT | Performed by: DERMATOLOGY

## 2020-06-15 PROCEDURE — 11103 TANGNTL BX SKIN EA SEP/ADDL: CPT | Performed by: DERMATOLOGY

## 2020-06-15 NOTE — PROGRESS NOTES
[facility]  Kait Melendez MD  [phone]      Beto Jacobsen  1945    69 y.o. female     Date of Visit: 6/15/2020    Chief Complaint: skin lesions    History of Present Illness:    1. Unknown duration of an atypical appearing lesion on the central back. She also complains of a persistent scaly lesion on the right shin. 2.  She also complains of several growths on the left upper chest.    3.  She has a history of multiple BCCs-denies any signs of recurrence. Superficial basal cell carcinoma the right posterior lower leg-treated with curettage on 4/26/2016. Nodular BCC of the left thigh - excised 6/19/15. Superficial BCC of the left shin - treated with curettage 6/19/15. Superficial BCC of the left lower calf - treated with curettage 6/19/15.       Review of Systems:  Skin: No new or changing moles. Past Medical History, Family History, Surgical History, Medications and Allergies reviewed.     Past Medical History:   Diagnosis Date    Anxiety     BCC (basal cell carcinoma of skin)     NBCC     COPD (chronic obstructive pulmonary disease) (HCC)     Hyperlipidemia     Hypertension     Obesity     Osteoarthritis     Type 2 diabetes mellitus ([facility] [address] 2/27/2015    Wears partial dentures      Past Surgical History:   Procedure Laterality Date    CARDIAC CATHETERIZATION      CARPAL TUNNEL RELEASE Right 10/19/2017    CARPAL TUNNEL RELEASE Left 11/02/2017     Left  Carpal Tunnel Release & Left Ulnar Nerve decompression at the Cubital Tunnel     CERVICAL LAMINECTOMY  04/18/2018    POSTERIOR CERVICAL LAMINECTOMY AND POSTERIOR FUSION C3-C7 WITH MEDTRONIC,EVOKES AND O-ARM    COLONOSCOPY      COLONOSCOPY N/A 12/6/2018    COLONOSCOPY performed by Atul Aguilar MD at [facility] N/A 05/08/2015    DILATATION AND CURETTAGE, HYSTEROSCOPY WITH MYOSURE    KNEE ARTHROPLASTY Right 11/03/2015    RIGHT TOTAL KNEE ARTHROPLASTY

## 2020-06-17 LAB — DERMATOLOGY PATHOLOGY REPORT: NORMAL

## 2020-07-07 ENCOUNTER — HOSPITAL ENCOUNTER (OUTPATIENT)
Age: 75
Discharge: HOME | End: 2020-07-07
Payer: COMMERCIAL

## 2020-07-07 LAB
A/G RATIO: 1.4 (ref 1.1–2.2)
ALBUMIN SERPL-MCNC: 4.1 G/DL (ref 3.4–5)
ALP BLD-CCNC: 122 U/L (ref 40–129)
ALT SERPL-CCNC: 31 U/L (ref 10–40)
ANION GAP SERPL CALCULATED.3IONS-SCNC: 14 MMOL/L (ref 3–16)
AST SERPL-CCNC: 30 U/L (ref 15–37)
BASOPHILS ABSOLUTE: 0.1 K/UL (ref 0–0.2)
BASOPHILS RELATIVE PERCENT: 0.7 %
BILIRUB SERPL-MCNC: 0.3 MG/DL (ref 0–1)
BUN BLDV-MCNC: 13 MG/DL (ref 7–20)
CALCIUM SERPL-MCNC: 9.7 MG/DL (ref 8.3–10.6)
CHLORIDE BLD-SCNC: 100 MMOL/L (ref 99–110)
CHOLESTEROL, FASTING: 280 MG/DL (ref 0–199)
CO2: 25 MMOL/L (ref 21–32)
CREAT SERPL-MCNC: 0.6 MG/DL (ref 0.6–1.2)
EOSINOPHILS ABSOLUTE: 0.3 K/UL (ref 0–0.6)
EOSINOPHILS RELATIVE PERCENT: 3.1 %
GFR AFRICAN AMERICAN: >60
GFR NON-AFRICAN AMERICAN: >60
GLOBULIN: 3 G/DL
GLUCOSE BLD-MCNC: 173 MG/DL (ref 70–99)
HCT VFR BLD CALC: 39.1 % (ref 36–48)
HDLC SERPL-MCNC: 63 MG/DL (ref 40–60)
HEMOGLOBIN: 12.9 G/DL (ref 12–16)
LDL CHOLESTEROL CALCULATED: 196 MG/DL
LYMPHOCYTES ABSOLUTE: 1.5 K/UL (ref 1–5.1)
LYMPHOCYTES RELATIVE PERCENT: 15.2 %
MCH RBC QN AUTO: 31.5 PG (ref 26–34)
MCHC RBC AUTO-ENTMCNC: 32.9 G/DL (ref 31–36)
MCV RBC AUTO: 95.7 FL (ref 80–100)
MONOCYTES ABSOLUTE: 0.9 K/UL (ref 0–1.3)
MONOCYTES RELATIVE PERCENT: 9.4 %
NEUTROPHILS ABSOLUTE: 6.9 K/UL (ref 1.7–7.7)
NEUTROPHILS RELATIVE PERCENT: 71.6 %
PDW BLD-RTO: 14 % (ref 12.4–15.4)
PLATELET # BLD: 350 K/UL (ref 135–450)
PMV BLD AUTO: 9.4 FL (ref 5–10.5)
POTASSIUM SERPL-SCNC: 4.3 MMOL/L (ref 3.5–5.1)
RBC # BLD: 4.09 M/UL (ref 4–5.2)
SODIUM BLD-SCNC: 139 MMOL/L (ref 136–145)
TOTAL PROTEIN: 7.1 G/DL (ref 6.4–8.2)
TRIGLYCERIDE, FASTING: 107 MG/DL (ref 0–150)
VLDLC SERPL CALC-MCNC: 21 MG/DL
WBC # BLD: 9.6 K/UL (ref 4–11)

## 2020-07-07 PROCEDURE — 83036 HEMOGLOBIN GLYCOSYLATED A1C: CPT

## 2020-07-07 PROCEDURE — 85025 COMPLETE CBC W/AUTO DIFF WBC: CPT

## 2020-07-07 PROCEDURE — 36415 COLL VENOUS BLD VENIPUNCTURE: CPT

## 2020-07-07 PROCEDURE — 80061 LIPID PANEL: CPT

## 2020-07-07 PROCEDURE — 80053 COMPREHEN METABOLIC PANEL: CPT

## 2020-07-08 LAB
ESTIMATED AVERAGE GLUCOSE: 194.4 MG/DL
HBA1C MFR BLD: 8.4 %

## 2020-07-08 RX ORDER — GABAPENTIN 100 MG/1
100 CAPSULE ORAL 3 TIMES DAILY
Qty: 270 CAPSULE | Refills: 2 | Status: SHIPPED | OUTPATIENT
Start: 2020-07-08 | End: 2020-07-14 | Stop reason: SDUPTHER

## 2020-07-13 ASSESSMENT — ENCOUNTER SYMPTOMS
WHEEZING: 1
SHORTNESS OF BREATH: 1

## 2020-07-13 ASSESSMENT — COPD QUESTIONNAIRES: COPD: 1

## 2020-07-13 NOTE — PROGRESS NOTES
SUBJECTIVE:    Katiana Cardoso is a 69 y.o. female who presents for a follow up visit. Chief Complaint   Patient presents with    Follow-up     Patient is here for a follow up. Would like to discuss medications. Has been getting hot flashes, fever blisters. Has started oxygen since last here. COPD   She complains of shortness of breath and wheezing. This is a chronic problem. The current episode started more than 1 year ago. The problem occurs intermittently. The problem has been gradually improving. Associated symptoms include dyspnea on exertion. Pertinent negatives include no postnasal drip or rhinorrhea. Her symptoms are aggravated by any activity and exercise. Her symptoms are alleviated by beta-agonist and steroid inhaler. She reports significant improvement on treatment. Her past medical history is significant for COPD. Diabetes   She presents for her follow-up diabetic visit. She has type 2 diabetes mellitus. Her disease course has been stable (Hemoglobin A1c 8.4). Hypoglycemia symptoms include nervousness/anxiousness. Pertinent negatives for hypoglycemia include no dizziness. Associated symptoms include fatigue. There are no hypoglycemic complications. Risk factors for coronary artery disease include diabetes mellitus, dyslipidemia, hypertension, sedentary lifestyle, post-menopausal and obesity. Current diabetic treatment includes oral agent (monotherapy). She is compliant with treatment most of the time. She is following a generally healthy diet. When asked about meal planning, she reported none. She rarely participates in exercise. An ACE inhibitor/angiotensin II receptor blocker is being taken. Hyperlipidemia   This is a chronic problem. The current episode started more than 1 year ago. The problem is uncontrolled. Lipid results: Total cholesterol 280, triglycerides 107, HDL 63, . Exacerbating diseases include diabetes and obesity.  Factors aggravating her hyperlipidemia include ARTHROPLASTY Right 11/03/2015    RIGHT TOTAL KNEE ARTHROPLASTY             SPINE SURGERY  02/07/2013    TONSILLECTOMY      TOTAL KNEE ARTHROPLASTY Left [id number]    ULNAR TUNNEL RELEASE Right 10/19/2017     Family History   Problem Relation Age of Onset    Heart Disease Mother         pacemaker    Cancer Mother         colon    COPD Father     Heart Disease Father     Colon Cancer Maternal Grandmother     Heart Disease Maternal Grandfather     Stroke Maternal Grandfather      Social History     Tobacco Use    Smoking status: Never Smoker    Smokeless tobacco: Never Used   Substance Use Topics    Alcohol use: Yes     Alcohol/week: 0.0 standard drinks     Comment: soc      Allergies   Allergen Reactions    Ace Inhibitors      cough    Actifed Cold-Allergy [Chlorpheniramine-Phenylephrine]     Cephalexin Other (See Comments)     Yeast infection if taken over a long period of time    Codeine Other (See Comments)     dizziness    Erythromycin Base      Patient denies on 2/28/2013    Lisinopril Other (See Comments)     Cough     Lovastatin Other (See Comments)     Myalgias; mouth sores; ELEV LFT    Metformin And Related      Diarrhea (time release is better but insurance doesn't cover), leg cramps, fatigue.     Sudafed [Pseudoephedrine Hcl] Other (See Comments)     Shakes     Trinalin [Azatadine-Pseudoephedrine]      Current Outpatient Medications on File Prior to Visit   Medication Sig Dispense Refill    FLUoxetine (PROZAC) 20 MG capsule Take 1 capsule by mouth daily 90 capsule 1    OXYGEN Inhale into the lungs      fluticasone-umeclidin-vilant (TRELEGY ELLIPTA) 100-62.5-25 MCG/INH AEPB Inhale 1 puff into the lungs daily 1 each 5    ipratropium-albuterol (DUONEB) 0.5-2.5 (3) MG/3ML SOLN nebulizer solution Inhale 3 mLs into the lungs every 4 hours DX:COPD J44.9 360 mL 11    albuterol sulfate HFA (PROAIR HFA) 108 (90 Base) MCG/ACT inhaler Inhale 2 puffs into the lungs every 6 hours as needed for Wheezing 1 Inhaler 6    Coenzyme Q10 (COQ10 PO) Take by mouth      acetaminophen (APAP EXTRA STRENGTH) 500 MG tablet Take 1,500 mg by mouth every morning      Cholecalciferol (VITAMIN D3) 50 MCG (2000 UT) CAPS Take by mouth      glucose blood VI test strips (ONE TOUCH TEST STRIPS) strip 1 each by In Vitro route daily. Check FBS and 4 pm  each 3    ONE TOUCH LANCETS MISC Check FBS and 4 pm  each 3    Blood Glucose Monitoring Suppl (ONE TOUCH ULTRA 2) W/DEVICE KIT 1 kit by Does not apply route daily as needed. 1 kit 0     No current facility-administered medications on file prior to visit. Review of Systems   Constitutional: Positive for fatigue and unexpected weight change. Negative for activity change. HENT: Negative for postnasal drip and rhinorrhea. Respiratory: Positive for shortness of breath and wheezing. Cardiovascular: Positive for dyspnea on exertion. Gastrointestinal: Negative for constipation and diarrhea. Genitourinary: Negative for difficulty urinating. Musculoskeletal: Positive for arthralgias and back pain ( improved). Neurological: Negative for dizziness and light-headedness. Psychiatric/Behavioral: Positive for dysphoric mood. Negative for sleep disturbance. The patient is nervous/anxious. OBJECTIVE:    /80   Pulse 74   Temp 98.4 °F (36.9 °C)   Wt 246 lb (111.6 kg)   LMP 03/01/1998   SpO2 99%   BMI 38.53 kg/m²    Physical Exam  Constitutional:       General: She is not in acute distress. Appearance: She is well-developed. Neck:      Vascular: No carotid bruit. Cardiovascular:      Rate and Rhythm: Normal rate and regular rhythm. Pulses:           Dorsalis pedis pulses are 2+ on the right side and 2+ on the left side. Posterior tibial pulses are 2+ on the right side and 2+ on the left side. Heart sounds: Normal heart sounds. No murmur. No friction rub. No gallop.     Pulmonary:      Effort: Pulmonary effort is

## 2020-07-14 ENCOUNTER — OFFICE VISIT (OUTPATIENT)
Dept: FAMILY MEDICINE CLINIC | Age: 75
End: 2020-07-14
Payer: COMMERCIAL

## 2020-07-14 VITALS
SYSTOLIC BLOOD PRESSURE: 126 MMHG | BODY MASS INDEX: 38.53 KG/M2 | TEMPERATURE: 98.4 F | OXYGEN SATURATION: 99 % | DIASTOLIC BLOOD PRESSURE: 80 MMHG | HEART RATE: 74 BPM | WEIGHT: 246 LBS

## 2020-07-14 PROCEDURE — 96372 THER/PROPH/DIAG INJ SC/IM: CPT | Performed by: FAMILY MEDICINE

## 2020-07-14 PROCEDURE — 99214 OFFICE O/P EST MOD 30 MIN: CPT | Performed by: FAMILY MEDICINE

## 2020-07-14 PROCEDURE — 3052F HG A1C>EQUAL 8.0%<EQUAL 9.0%: CPT | Performed by: FAMILY MEDICINE

## 2020-07-14 RX ORDER — GABAPENTIN 100 MG/1
100 CAPSULE ORAL 3 TIMES DAILY
Qty: 270 CAPSULE | Refills: 2 | Status: SHIPPED | OUTPATIENT
Start: 2020-07-14 | End: 2020-08-04 | Stop reason: SDUPTHER

## 2020-07-14 RX ORDER — EZETIMIBE 10 MG/1
10 TABLET ORAL DAILY
Qty: 90 TABLET | Refills: 1 | Status: SHIPPED | OUTPATIENT
Start: 2020-07-14

## 2020-07-14 RX ORDER — LOSARTAN POTASSIUM 50 MG/1
TABLET ORAL
Qty: 90 TABLET | Refills: 3 | Status: SHIPPED | OUTPATIENT
Start: 2020-07-14 | End: 2020-07-21

## 2020-07-14 RX ORDER — ATENOLOL 50 MG/1
TABLET ORAL
Qty: 90 TABLET | Refills: 3 | Status: SHIPPED | OUTPATIENT
Start: 2020-07-14 | End: 2020-09-29

## 2020-07-14 RX ORDER — CYANOCOBALAMIN 1000 UG/ML
1000 INJECTION INTRAMUSCULAR; SUBCUTANEOUS ONCE
Status: COMPLETED | OUTPATIENT
Start: 2020-07-14 | End: 2020-07-14

## 2020-07-14 RX ORDER — DICLOFENAC SODIUM 75 MG/1
TABLET, DELAYED RELEASE ORAL
Qty: 180 TABLET | Refills: 3 | Status: SHIPPED | OUTPATIENT
Start: 2020-07-14

## 2020-07-14 RX ORDER — GLIMEPIRIDE 2 MG/1
TABLET ORAL
Qty: 135 TABLET | Refills: 3 | Status: SHIPPED | OUTPATIENT
Start: 2020-07-14

## 2020-07-14 RX ADMIN — CYANOCOBALAMIN 1000 MCG: 1000 INJECTION INTRAMUSCULAR; SUBCUTANEOUS at 13:58

## 2020-07-14 ASSESSMENT — ENCOUNTER SYMPTOMS
DIARRHEA: 0
RHINORRHEA: 0
BACK PAIN: 1
CONSTIPATION: 0

## 2020-07-21 RX ORDER — VALSARTAN 160 MG/1
160 TABLET ORAL DAILY
Qty: 90 TABLET | Refills: 3 | Status: SHIPPED | OUTPATIENT
Start: 2020-07-21

## 2020-08-03 NOTE — TELEPHONE ENCOUNTER
Patient was told by  that one of the meds she is on will cause yeast infections. She wants to know if there is something she can have called into local [facility] [address]. She has already tried [employer] over the counter.      [address] [place] [address] [address] [facility] [phone] - F [phone]      Provider out of office      Please advise

## 2020-08-04 RX ORDER — GABAPENTIN 100 MG/1
100 CAPSULE ORAL 3 TIMES DAILY
Qty: 270 CAPSULE | Refills: 1 | Status: SHIPPED | OUTPATIENT
Start: 2020-08-04 | End: 2020-11-02

## 2020-08-04 RX ORDER — FLUCONAZOLE 100 MG/1
100 TABLET ORAL DAILY
Qty: 14 TABLET | Refills: 0 | Status: SHIPPED | OUTPATIENT
Start: 2020-08-04 | End: 2020-08-28 | Stop reason: SDUPTHER

## 2020-08-17 NOTE — TELEPHONE ENCOUNTER
Patient requesting a medication refill.   Medication:Amoxicillin 500 MG TABS [[id number]]   Pharmacy:[facility] [address], [address] [facility] [phone] Mansi Arguello [phone]  Last office visit: 7/14/20  Next office visit: 10/16/2020

## 2020-08-18 RX ORDER — AMOXICILLIN 500 MG/1
TABLET, FILM COATED ORAL
Qty: 4 TABLET | Refills: 1 | Status: SHIPPED | OUTPATIENT
Start: 2020-08-18

## 2020-08-28 ENCOUNTER — TELEPHONE (OUTPATIENT)
Dept: FAMILY MEDICINE CLINIC | Age: 75
End: 2020-08-28

## 2020-08-28 RX ORDER — FLUCONAZOLE 100 MG/1
100 TABLET ORAL DAILY
Qty: 14 TABLET | Refills: 0 | Status: SHIPPED | OUTPATIENT
Start: 2020-08-28 | End: 2020-09-11

## 2020-08-28 NOTE — TELEPHONE ENCOUNTER
The Jardiance can be causing this. Once the blood sugar comes under better control the yeast infection will resolve. It is fine to refill her fluconazole.

## 2020-09-01 ENCOUNTER — NURSE TRIAGE (OUTPATIENT)
Dept: OTHER | Facility: CLINIC | Age: 75
End: 2020-09-01

## 2020-09-01 ENCOUNTER — OFFICE VISIT (OUTPATIENT)
Dept: FAMILY MEDICINE CLINIC | Age: 75
End: 2020-09-01
Payer: COMMERCIAL

## 2020-09-01 VITALS
DIASTOLIC BLOOD PRESSURE: 80 MMHG | TEMPERATURE: 99.6 F | SYSTOLIC BLOOD PRESSURE: 124 MMHG | BODY MASS INDEX: 38.69 KG/M2 | HEART RATE: 82 BPM | WEIGHT: 247 LBS | OXYGEN SATURATION: 99 %

## 2020-09-01 PROCEDURE — 99213 OFFICE O/P EST LOW 20 MIN: CPT | Performed by: FAMILY MEDICINE

## 2020-09-01 PROCEDURE — 96372 THER/PROPH/DIAG INJ SC/IM: CPT | Performed by: FAMILY MEDICINE

## 2020-09-01 RX ORDER — CYANOCOBALAMIN 1000 UG/ML
1000 INJECTION INTRAMUSCULAR; SUBCUTANEOUS ONCE
Status: COMPLETED | OUTPATIENT
Start: 2020-09-01 | End: 2020-09-01

## 2020-09-01 RX ORDER — FUROSEMIDE 20 MG/1
20 TABLET ORAL DAILY
Qty: 30 TABLET | Refills: 1 | Status: SHIPPED | OUTPATIENT
Start: 2020-09-01 | End: 2020-10-01

## 2020-09-01 RX ADMIN — CYANOCOBALAMIN 1000 MCG: 1000 INJECTION INTRAMUSCULAR; SUBCUTANEOUS at 12:13

## 2020-09-01 NOTE — PROGRESS NOTES
SUBJECTIVE:    Kristen Bustillos is a 69 y.o. female who presents for a follow up visit. Chief Complaint   Patient presents with    Foot Swelling     Bilateral foot swelling/redness x 4-5 days. Hands also feel a little \"hot and tight\"        Foot Pain    The pain is present in the left foot and right foot. This is a new problem. The current episode started in the past 7 days. The problem occurs constantly. The problem has been unchanged. The quality of the pain is described as aching. The pain is mild. Pertinent negatives include no fever. Associated symptoms comments: Both feet are swollen and slightly discolored appearing to have more redness although it does not look like there is any infection. . The symptoms are aggravated by standing. She has tried rest for the symptoms. The treatment provided mild relief. Her past medical history is significant for diabetes. Patient's medications, allergies, past medical,surgical, social and family histories were reviewed and updated as appropriate.      Past Medical History:   Diagnosis Date    Anxiety     BCC (basal cell carcinoma of skin)     NBCC     COPD (chronic obstructive pulmonary disease) (HCC)     Hyperlipidemia     Hypertension     Obesity     Osteoarthritis     Type 2 diabetes mellitus ([facility] [address] 2/27/2015    Wears partial dentures      Past Surgical History:   Procedure Laterality Date    CARDIAC CATHETERIZATION      CARPAL TUNNEL RELEASE Right 10/19/2017    CARPAL TUNNEL RELEASE Left 11/02/2017     Left  Carpal Tunnel Release & Left Ulnar Nerve decompression at the Cubital Tunnel     CERVICAL LAMINECTOMY  04/18/2018    POSTERIOR CERVICAL LAMINECTOMY AND POSTERIOR FUSION C3-C7 WITH MEDTRONIC,EVOKES AND O-ARM    COLONOSCOPY      COLONOSCOPY N/A 12/6/2018    COLONOSCOPY performed by [facility] G[facility] at [facility] N/A 05/08/2015    DILATATION AND CURETTAGE, HYSTEROSCOPY WITH MYOSURE    KNEE ARTHROPLASTY Right 11/03/2015    RIGHT TOTAL KNEE ARTHROPLASTY             SPINE SURGERY  02/07/2013    TONSILLECTOMY      TOTAL KNEE ARTHROPLASTY Left [id number]    ULNAR TUNNEL RELEASE Right 10/19/2017     Family History   Problem Relation Age of Onset    Heart Disease Mother         pacemaker    Cancer Mother         colon    COPD Father     Heart Disease Father     Colon Cancer Maternal Grandmother     Heart Disease Maternal Grandfather     Stroke Maternal Grandfather      Social History     Tobacco Use    Smoking status: Never Smoker    Smokeless tobacco: Never Used   Substance Use Topics    Alcohol use: Yes     Alcohol/week: 0.0 standard drinks     Comment: soc      Allergies   Allergen Reactions    Ace Inhibitors      cough    Actifed Cold-Allergy [Chlorpheniramine-Phenylephrine]     Cephalexin Other (See Comments)     Yeast infection if taken over a long period of time    Codeine Other (See Comments)     dizziness    Erythromycin Base      Patient denies on 2/28/2013    Lisinopril Other (See Comments)     Cough     Lovastatin Other (See Comments)     Myalgias; mouth sores; ELEV LFT    Metformin And Related      Diarrhea (time release is better but insurance doesn't cover), leg cramps, fatigue.  Sudafed [Pseudoephedrine Hcl] Other (See Comments)     Shakes     Trinalin [Azatadine-Pseudoephedrine]      Current Outpatient Medications on File Prior to Visit   Medication Sig Dispense Refill    fluconazole (DIFLUCAN) 100 MG tablet Take 1 tablet by mouth daily for 14 days 14 tablet 0    gabapentin (NEURONTIN) 100 MG capsule Take 1 capsule by mouth 3 times daily for 90 days.  Intended supply: 30 days 270 capsule 1    valsartan (DIOVAN) 160 MG tablet Take 1 tablet by mouth daily 90 tablet 3    glimepiride (AMARYL) 2 MG tablet Take 1 1/2 daily 135 tablet 3    diclofenac (VOLTAREN) 75 MG EC tablet TAKE 1 TABLET TWICE A  tablet 3    atenolol (TENORMIN) 50 MG tablet TAKE 1 TABLET BY MOUTH EVERY DAY 90 tablet 3    dapagliflozin (FARXIGA) 10 MG tablet Take 1 tablet by mouth every morning 90 tablet 1    ezetimibe (ZETIA) 10 MG tablet Take 1 tablet by mouth daily 90 tablet 1    FLUoxetine (PROZAC) 20 MG capsule Take 1 capsule by mouth daily 90 capsule 1    OXYGEN Inhale into the lungs      fluticasone-umeclidin-vilant (TRELEGY ELLIPTA) 100-62.5-25 MCG/INH AEPB Inhale 1 puff into the lungs daily 1 each 5    ipratropium-albuterol (DUONEB) 0.5-2.5 (3) MG/3ML SOLN nebulizer solution Inhale 3 mLs into the lungs every 4 hours DX:COPD J44.9 360 mL 11    albuterol sulfate HFA (PROAIR HFA) 108 (90 Base) MCG/ACT inhaler Inhale 2 puffs into the lungs every 6 hours as needed for Wheezing 1 Inhaler 6    Coenzyme Q10 (COQ10 PO) Take by mouth      acetaminophen (APAP EXTRA STRENGTH) 500 MG tablet Take 1,500 mg by mouth every morning      Cholecalciferol (VITAMIN D3) 50 MCG (2000 UT) CAPS Take by mouth      glucose blood VI test strips (ONE TOUCH TEST STRIPS) strip 1 each by In Vitro route daily. Check FBS and 4 pm  each 3    ONE TOUCH LANCETS MISC Check FBS and 4 pm  each 3    Blood Glucose Monitoring Suppl (ONE TOUCH ULTRA 2) W/DEVICE KIT 1 kit by Does not apply route daily as needed. 1 kit 0    Amoxicillin 500 MG TABS 4 tabs 1 hour prior to procedure (Patient not taking: Reported on 9/1/2020) 4 tablet 1     No current facility-administered medications on file prior to visit. Review of Systems   Constitutional: Negative for fever. OBJECTIVE:    /80   Pulse 82   Temp 99.6 °F (37.6 °C)   Wt 247 lb (112 kg)   LMP 03/01/1998   SpO2 99% Comment: 2L  BMI 38.69 kg/m²    Physical Exam  Constitutional:       Appearance: She is well-developed. HENT:      Head: Normocephalic and atraumatic. Eyes:      Conjunctiva/sclera: Conjunctivae normal.   Neck:      Musculoskeletal: Normal range of motion and neck supple. Thyroid: No thyromegaly. Vascular: No JVD. Trachea: No tracheal deviation. Cardiovascular:      Rate and Rhythm: Normal rate and regular rhythm. Heart sounds: Normal heart sounds. Pulmonary:      Effort: Pulmonary effort is normal. No respiratory distress. Breath sounds: Normal breath sounds. No rales. Musculoskeletal:      Right lower leg: Edema present. Left lower leg: Edema present. Lymphadenopathy:      Cervical: No cervical adenopathy. Skin:     General: Skin is warm and dry. Neurological:      General: No focal deficit present. Mental Status: She is alert and oriented to person, place, and time. Psychiatric:         Mood and Affect: Mood normal.         Behavior: Behavior normal.         ASSESSMENT/PLAN:    Aliyah Rutledge was seen today for foot swelling. Diagnoses and all orders for this visit:    Localized edema  -     furosemide (LASIX) 20 MG tablet; Take 1 tablet by mouth daily  -     Basic Metabolic Panel; Future  She is encouraged to decrease her salt intake. Continue all other meds. She is planning to go to [facility] for a week. When she returns we will see how she is responded to Lasix. She is cautioned to still drink plenty of water but decrease her salt intake. She is to watch for signs of dehydration. She is cautioned because of the new prescription for Lasix as well as her recent start of [facility] [place]. Vitamin B12 deficiency  -     cyanocobalamin injection 1,000 mcg        Return in about 2 weeks (around 9/15/2020). With lab prior    Please note portions of this note were completed with a voicerecognition program.  Efforts were made to edit the dictations but occasionally words are mis-transcribed.

## 2020-09-01 NOTE — TELEPHONE ENCOUNTER
Reason for Disposition   Swollen foot (EXCEPTIONs: localized bump from bunions, calluses, insect bite, sting)    Answer Assessment - Initial Assessment Questions  1. ONSET: \"When did the pain start? \"       Four days ago  2. LOCATION: \"Where is the pain located? \"       Bilateral feet  3. PAIN: \"How bad is the pain? \"    (Scale 1-10; or mild, moderate, severe)    -  MILD (1-3): doesn't interfere with normal activities     -  MODERATE (4-7): interferes with normal activities (e.g., work or school) or awakens from sleep, limping     -  SEVERE (8-10): excruciating pain, unable to do any normal activities, unable to walk      mild  4. WORK OR EXERCISE: \"Has there been any recent work or exercise that involved this part of the body? \"       no  5. CAUSE: \"What do you think is causing the foot pain? \"      Not sure  6. OTHER SYMPTOMS: \"Do you have any other symptoms? \" (e.g., leg pain, rash, fever, numbness)      Redness to bilateral feet   7. PREGNANCY: \"Is there any chance you are pregnant? \" \"When was your last menstrual period? \"      no    Protocols used: FOOT PAIN-ADULT-[place]    POD 1 [place]  Swelling and redness to bilateral feet    Caller triage, care advice provided, caller verbalized understanding, transferred to [facility] for scheduling. Please do not reply to the triage nurse through this encounter. Any subsequent communication should be directly with the patient.

## 2020-09-29 RX ORDER — ATENOLOL 50 MG/1
TABLET ORAL
Qty: 90 TABLET | Refills: 1 | Status: SHIPPED | OUTPATIENT
Start: 2020-09-29

## 2020-11-10 RX ORDER — FLUOXETINE HYDROCHLORIDE 20 MG/1
CAPSULE ORAL
Qty: 90 CAPSULE | Refills: 1 | OUTPATIENT
Start: 2020-11-10

## 2022-08-09 NOTE — PRE-PROCEDURE INSTRUCTIONS
[address] [facility] time____________        Surgery time____________    Take the following medications with a sip of water:    Do not eat or drink anything after 12:00 midnight prior to your surgery. This includes water chewing gum, mints and ice chips. You may brush your teeth and gargle the morning of your surgery, but do not swallow the water     Please see your family doctor/pediatrician for a history and physical and/or concerning medications. Bring any test results/reports from your physicians office. If you are under the care of a heart doctor or specialist doctor, please be aware that you may be asked to them for clearance    You may be asked to stop blood thinners such as Coumadin, Plavix, Fragmin, Lovenox, etc., or any anti-inflammatories such as:  Aspirin, Ibuprofen, Advil, Naproxen prior to your surgery. We also ask that you stop any OTC medications such as fish oil, vitamin E, glucosamine, garlic, Multivitamins, COQ 10, etc.    We ask that you do not smoke 24 hours prior to surgery  We ask that you do not  drink any alcoholic beverages 24 hours prior to surgery     You must make arrangements for a responsible adult to take you home after your surgery. For your safety you will not be allowed to leave alone or drive yourself home. Your surgery will be cancelled if you do not have a ride home. Also for your safety, it is strongly suggested that someone stay with you the first 24 hours after your surgery. A parent or legal guardian must accompany a child scheduled for surgery and plan to stay at the hospital until the child is discharged. Please do not bring other children with you. For your comfort, please wear simple loose fitting clothing to the hospital.  Please do not bring valuables.     Do not wear any make-up or nail polish on your fingers or toes      For your safety, please do not wear any jewelry or body piercing's on the day of Topical Ketoconazole Counseling: Patient counseled that this medication may cause skin irritation or allergic reactions.  In the event of skin irritation, the patient was advised to reduce the amount of the drug applied or use it less frequently.   The patient verbalized understanding of the proper use and possible adverse effects of ketoconazole.  All of the patient's questions and concerns were addressed. surgery. All jewelry must be removed. If you have dentures, they will be removed before going to operating room. For your convenience, we will provide you with a container. If you wear contact lenses or glasses, they will be removed, please bring a case for them. If you have a living will and a durable power of  for healthcare, please bring in a copy. As part of our patient safety program to minimize surgical site infections, we ask you to do the following:    · Please notify your surgeon if you develop any illness between         now and the  day of your surgery. · This includes a cough, cold, fever, sore throat, nausea,         or vomiting, and diarrhea, etc.  ·  Please notify your surgeon if you experience dizziness, shortness         of breath or blurred vision between now and the time of your surgery. Do not shave your operative site 96 hours prior to surgery. For face and neck surgery, men may use an electric razor 48 hours   prior to surgery. You may shower the night before surgery or the morning of   your surgery with an antibacterial soap. You will need to bring a photo ID and insurance card    [facility] has an onsite pharmacy, would you like to utilize our pharmacy     If you will be staying overnight and use a C-pap machine, please bring   your C-pap to hospital     Our goal is to provide you with excellent care, therefore, visitors will be limited to two(2) in the room at a time so that we may focus on providing this care for you. Please contact pre-admission testing if you have any further questions. [facility] phone number:  [phone] [facility] fax number:  [phone]  Please note these are generalized instructions for all surgical cases, you may be provided with more specific instructions according to your surgery.

## (undated) DEVICE — PROCEDURE KIT ENDOSCP CUST

## (undated) DEVICE — BW-412T DISP COMBO CLEANING BRUSH: Brand: SINGLE USE COMBINATION CLEANING BRUSH

## (undated) DEVICE — SOLUTION IV IRRIG WATER 500ML POUR BRL ST 2F7113